# Patient Record
Sex: MALE | Race: WHITE | NOT HISPANIC OR LATINO | Employment: OTHER | URBAN - NONMETROPOLITAN AREA
[De-identification: names, ages, dates, MRNs, and addresses within clinical notes are randomized per-mention and may not be internally consistent; named-entity substitution may affect disease eponyms.]

---

## 2018-03-06 ENCOUNTER — OFFICE VISIT (OUTPATIENT)
Dept: UROLOGY | Facility: CLINIC | Age: 83
End: 2018-03-06

## 2018-03-06 DIAGNOSIS — N13.8 BPH WITH URINARY OBSTRUCTION: Primary | ICD-10-CM

## 2018-03-06 DIAGNOSIS — N18.30 CKD (CHRONIC KIDNEY DISEASE) STAGE 3, GFR 30-59 ML/MIN (HCC): ICD-10-CM

## 2018-03-06 DIAGNOSIS — N32.81 DETRUSOR INSTABILITY: ICD-10-CM

## 2018-03-06 DIAGNOSIS — N40.1 BPH WITH URINARY OBSTRUCTION: Primary | ICD-10-CM

## 2018-03-06 LAB
BILIRUB BLD-MCNC: NEGATIVE MG/DL
CLARITY, POC: CLEAR
COLOR UR: YELLOW
GLUCOSE UR STRIP-MCNC: NEGATIVE MG/DL
KETONES UR QL: NEGATIVE
LEUKOCYTE EST, POC: NEGATIVE
NITRITE UR-MCNC: NEGATIVE MG/ML
PH UR: 7 [PH] (ref 5–8)
PROT UR STRIP-MCNC: ABNORMAL MG/DL
RBC # UR STRIP: NEGATIVE /UL
SP GR UR: 1.02 (ref 1–1.03)
UROBILINOGEN UR QL: NORMAL

## 2018-03-06 PROCEDURE — 81003 URINALYSIS AUTO W/O SCOPE: CPT | Performed by: UROLOGY

## 2018-03-06 PROCEDURE — 51798 US URINE CAPACITY MEASURE: CPT | Performed by: UROLOGY

## 2018-03-06 PROCEDURE — 99204 OFFICE O/P NEW MOD 45 MIN: CPT | Performed by: UROLOGY

## 2018-03-06 RX ORDER — CETIRIZINE HYDROCHLORIDE 10 MG/1
TABLET, FILM COATED ORAL
Refills: 5 | COMMUNITY
Start: 2018-02-06 | End: 2022-01-02

## 2018-03-06 RX ORDER — SPIRONOLACTONE 25 MG/1
25 TABLET ORAL DAILY
Refills: 5 | COMMUNITY
Start: 2018-02-06 | End: 2022-01-08 | Stop reason: HOSPADM

## 2018-03-06 RX ORDER — LINAGLIPTIN 5 MG/1
TABLET, FILM COATED ORAL
Refills: 5 | COMMUNITY
Start: 2018-02-06 | End: 2022-01-02

## 2018-03-06 RX ORDER — LEVOTHYROXINE SODIUM 0.07 MG/1
75 TABLET ORAL DAILY
Refills: 5 | COMMUNITY
Start: 2018-02-06

## 2018-03-06 RX ORDER — BUMETANIDE 1 MG/1
1 TABLET ORAL
Refills: 2 | COMMUNITY
Start: 2018-02-06 | End: 2022-01-08 | Stop reason: HOSPADM

## 2018-03-06 RX ORDER — CARVEDILOL 6.25 MG/1
6.25 TABLET ORAL 2 TIMES DAILY WITH MEALS
Refills: 5 | COMMUNITY
Start: 2018-02-06

## 2018-03-06 RX ORDER — AMLODIPINE BESYLATE 10 MG/1
10 TABLET ORAL NIGHTLY
Refills: 5 | COMMUNITY
Start: 2018-02-06 | End: 2022-01-08 | Stop reason: HOSPADM

## 2018-03-06 RX ORDER — ASPIRIN 325 MG
325 TABLET, DELAYED RELEASE (ENTERIC COATED) ORAL DAILY
Refills: 5 | COMMUNITY
Start: 2018-02-06

## 2018-03-06 RX ORDER — HYDROCODONE BITARTRATE AND ACETAMINOPHEN 5; 325 MG/1; MG/1
1 TABLET ORAL 2 TIMES DAILY PRN
Refills: 0 | COMMUNITY
Start: 2018-02-06

## 2018-03-06 RX ORDER — ROSUVASTATIN CALCIUM 40 MG/1
40 TABLET, COATED ORAL DAILY
Refills: 5 | COMMUNITY
Start: 2018-02-06 | End: 2022-01-08 | Stop reason: HOSPADM

## 2018-03-06 RX ORDER — HYDRALAZINE HYDROCHLORIDE 100 MG/1
100 TABLET, FILM COATED ORAL 3 TIMES DAILY
Refills: 3 | COMMUNITY
Start: 2018-02-06 | End: 2022-01-08 | Stop reason: HOSPADM

## 2018-03-06 RX ORDER — FERROUS SULFATE 325(65) MG
325 TABLET ORAL 2 TIMES DAILY
Refills: 5 | COMMUNITY
Start: 2018-02-06

## 2018-03-06 RX ORDER — GABAPENTIN 600 MG/1
600 TABLET ORAL DAILY PRN
Refills: 2 | COMMUNITY
Start: 2018-02-06 | End: 2022-01-08 | Stop reason: HOSPADM

## 2018-03-06 RX ORDER — FINASTERIDE 5 MG/1
TABLET, FILM COATED ORAL
Refills: 3 | COMMUNITY
Start: 2018-02-08 | End: 2022-01-02

## 2018-03-06 RX ORDER — LOSARTAN POTASSIUM 100 MG/1
TABLET ORAL
Refills: 5 | COMMUNITY
Start: 2018-02-06 | End: 2022-01-02

## 2018-03-06 RX ORDER — TERAZOSIN 5 MG/1
5 CAPSULE ORAL NIGHTLY
Refills: 1 | COMMUNITY
Start: 2018-02-06 | End: 2022-01-08 | Stop reason: HOSPADM

## 2018-03-06 RX ORDER — POLYETHYLENE GLYCOL 3350 17 G/17G
17 POWDER, FOR SOLUTION ORAL DAILY PRN
Refills: 11 | COMMUNITY
Start: 2018-02-06

## 2018-03-06 NOTE — PROGRESS NOTES
Chief Complaint:          Chief Complaint   Patient presents with   • Urinary Frequency       HPI:   82 y.o. male.  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  Frequency urgency and 3 episodes of urge incontinence daily.  He's not had a stroke.  He has significant stage III chronic kidney disease with a creatinine of 2.22 on significant polypharmacy.  He was just started on finasteride.  He has no burning, blood, dribbling or hesitancy.  I explained to his daughter at length he has multiple indications for frequency including the significant polypharmacy, the significant chronic kidney disease.  His diabetes is extremely well controlled.    Past Medical History:        Past Medical History:   Diagnosis Date   • BPH with urinary obstruction    • CHF (congestive heart failure)    • Chronic kidney disease    • Diabetes mellitus    • Hypertension          Current Meds:     Current Outpatient Prescriptions   Medication Sig Dispense Refill   • ALL DAY ALLERGY 10 MG tablet   5   • amLODIPine (NORVASC) 10 MG tablet   5   • aspirin  MG tablet   5   • bumetanide (BUMEX) 1 MG tablet   2   • carvedilol (COREG) 6.25 MG tablet   5   • ferrous sulfate 325 (65 FE) MG tablet   5   • finasteride (PROSCAR) 5 MG tablet   3   • gabapentin (NEURONTIN) 600 MG tablet   2   • hydrALAZINE (APRESOLINE) 100 MG tablet   3   • HYDROcodone-acetaminophen (NORCO) 5-325 MG per tablet   0   • Insulin Glargine (Insulin Glargine) 100 UNIT/ML injection pen   1   • levothyroxine (SYNTHROID, LEVOTHROID) 75 MCG tablet   5   • losartan (COZAAR) 100 MG tablet   5   • polyethylene glycol (MIRALAX) powder   11   • rosuvastatin (CRESTOR) 40 MG tablet   5   • spironolactone (ALDACTONE) 25 MG tablet   5   • terazosin (HYTRIN) 5 MG capsule   1   • TRADJENTA 5 MG tablet tablet   5   • VOLTAREN 1 % gel gel   11     No  current facility-administered medications for this visit.         Allergies:      Allergies   Allergen Reactions   • Keflex [Cephalexin] Hives   • Sulfa Antibiotics Hives        Past Surgical History:     Past Surgical History:   Procedure Laterality Date   • CARDIAC SURGERY     • CARDIAC VALVE REPLACEMENT           Social History:     Social History     Social History   • Marital status:      Spouse name: N/A   • Number of children: N/A   • Years of education: N/A     Occupational History   • Not on file.     Social History Main Topics   • Smoking status: Never Smoker   • Smokeless tobacco: Not on file   • Alcohol use No   • Drug use: No   • Sexual activity: Not on file     Other Topics Concern   • Not on file     Social History Narrative   • No narrative on file       Family History:     Family History   Problem Relation Age of Onset   • No Known Problems Father    • No Known Problems Mother        Review of Systems:     Review of Systems   Constitutional: Negative.    HENT: Positive for hearing loss.    Eyes: Negative.    Respiratory: Negative.    Cardiovascular: Negative.    Gastrointestinal: Negative.    Endocrine: Negative.    Genitourinary: Positive for frequency and urgency.   Musculoskeletal: Negative.    Allergic/Immunologic: Negative.    Neurological: Negative.    Hematological: Negative.    Psychiatric/Behavioral: Negative.        Physical Exam:     Physical Exam   Constitutional: He is oriented to person, place, and time. He appears well-developed and well-nourished.   HENT:   Head: Normocephalic and atraumatic.   Eyes: Conjunctivae and EOM are normal. Pupils are equal, round, and reactive to light.   Neck: Normal range of motion.   Cardiovascular: Normal rate, regular rhythm, normal heart sounds and intact distal pulses.    Pulmonary/Chest: Effort normal and breath sounds normal.   Abdominal: Soft. Bowel sounds are normal.   Genitourinary: Rectum normal, prostate normal and penis normal.    Genitourinary Comments: Hard of hearing, Soft nontender abdomen with no organomegaly, rigidity, or tenderness.  He has normal external genitalia and uncircumcised phallus with a freely movable foreskin bilaterally descended testes without masses there is no inguinal hernias adenopathy or abnormalities he had good rectal tone and a small smooth firm prostate.  There is no nodularity or any suspicious rectal abnormalities     Musculoskeletal: Normal range of motion.   Neurological: He is alert and oriented to person, place, and time. He has normal reflexes.   Skin: Skin is warm and dry.   Psychiatric: He has a normal mood and affect. His behavior is normal. Judgment and thought content normal.   Nursing note and vitals reviewed.      I have reviewed the following portions of the patient's history: allergies, current medications, past family history, past medical history, past social history, past surgical history, problem list and ROS and confirm it's accurate.      Procedure:       Assessment/Plan:   Detrusor instability-very symptomatic with a marked- 3 episodes of urge incontinence daily.  I'm going to start him on anticholinergic and gave him samples of Myrbetriq.  BPH: Discussed the pathophysiology of BPH and obstruction.  We discussed the static and dynamic effect effects of BPH as well as using 5 alpha reductase inhibitors versus alpha blockade.  We discussed the indications for transurethral surgery as well.  And/ or other therapeutic options available including all of the newer techniques.  The very small prostate and therefore I recommended he stop the finasteride.  Chronic kidney disease-III-with a creatinine of 2.22 explained to his daughter of the difficulty in urgency given the fact he has to urinate a chronically diluted urine           This document has been electronically signed by MARY MOON MD March 6, 2018 8:30 AM

## 2018-03-08 PROBLEM — N18.30 CKD (CHRONIC KIDNEY DISEASE) STAGE 3, GFR 30-59 ML/MIN (HCC): Status: ACTIVE | Noted: 2018-03-08

## 2018-03-08 PROBLEM — N32.81 DETRUSOR INSTABILITY: Status: ACTIVE | Noted: 2018-03-08

## 2020-06-25 ENCOUNTER — TRANSCRIBE ORDERS (OUTPATIENT)
Dept: ADMINISTRATIVE | Facility: HOSPITAL | Age: 85
End: 2020-06-25

## 2020-06-25 ENCOUNTER — LAB (OUTPATIENT)
Dept: LAB | Facility: HOSPITAL | Age: 85
End: 2020-06-25

## 2020-06-25 DIAGNOSIS — N18.9 ANEMIA IN CHRONIC KIDNEY DISEASE, UNSPECIFIED CKD STAGE: ICD-10-CM

## 2020-06-25 DIAGNOSIS — N18.30 CHRONIC KIDNEY DISEASE, STAGE III (MODERATE) (HCC): ICD-10-CM

## 2020-06-25 DIAGNOSIS — D63.1 ANEMIA IN CHRONIC KIDNEY DISEASE, UNSPECIFIED CKD STAGE: ICD-10-CM

## 2020-06-25 DIAGNOSIS — E11.21 TYPE 2 DIABETES MELLITUS WITH DIABETIC NEPHROPATHY, UNSPECIFIED WHETHER LONG TERM INSULIN USE (HCC): ICD-10-CM

## 2020-06-25 DIAGNOSIS — E03.9 HYPOTHYROIDISM, UNSPECIFIED TYPE: ICD-10-CM

## 2020-06-25 DIAGNOSIS — N25.81 SECONDARY HYPERPARATHYROIDISM OF RENAL ORIGIN (HCC): ICD-10-CM

## 2020-06-25 DIAGNOSIS — N18.9 CHRONIC KIDNEY DISEASE, UNSPECIFIED CKD STAGE: Primary | ICD-10-CM

## 2020-06-25 DIAGNOSIS — N18.9 CHRONIC KIDNEY DISEASE, UNSPECIFIED CKD STAGE: ICD-10-CM

## 2020-06-25 DIAGNOSIS — E87.1 HYPONATREMIA: ICD-10-CM

## 2020-06-25 LAB
ALBUMIN SERPL-MCNC: 4.5 G/DL (ref 3.5–5.2)
ALBUMIN/GLOB SERPL: 2 G/DL
ALP SERPL-CCNC: 76 U/L (ref 39–117)
ALT SERPL W P-5'-P-CCNC: 21 U/L (ref 1–41)
ANION GAP SERPL CALCULATED.3IONS-SCNC: 13 MMOL/L (ref 5–15)
AST SERPL-CCNC: 26 U/L (ref 1–40)
BASOPHILS # BLD AUTO: 0.05 10*3/MM3 (ref 0–0.2)
BASOPHILS NFR BLD AUTO: 0.8 % (ref 0–1.5)
BILIRUB SERPL-MCNC: 0.3 MG/DL (ref 0.2–1.2)
BUN BLD-MCNC: 32 MG/DL (ref 8–23)
BUN/CREAT SERPL: 16.4 (ref 7–25)
CALCIUM SPEC-SCNC: 10.2 MG/DL (ref 8.6–10.5)
CHLORIDE SERPL-SCNC: 100 MMOL/L (ref 98–107)
CO2 SERPL-SCNC: 23 MMOL/L (ref 22–29)
CREAT BLD-MCNC: 1.95 MG/DL (ref 0.76–1.27)
CREAT UR-MCNC: 56.2 MG/DL
DEPRECATED RDW RBC AUTO: 44.2 FL (ref 37–54)
EOSINOPHIL # BLD AUTO: 0.39 10*3/MM3 (ref 0–0.4)
EOSINOPHIL NFR BLD AUTO: 6.1 % (ref 0.3–6.2)
ERYTHROCYTE [DISTWIDTH] IN BLOOD BY AUTOMATED COUNT: 12.4 % (ref 12.3–15.4)
FERRITIN SERPL-MCNC: 452 NG/ML (ref 30–400)
GFR SERPL CREATININE-BSD FRML MDRD: 33 ML/MIN/1.73
GLOBULIN UR ELPH-MCNC: 2.3 GM/DL
GLUCOSE BLD-MCNC: 92 MG/DL (ref 65–99)
HCT VFR BLD AUTO: 34.6 % (ref 37.5–51)
HGB BLD-MCNC: 11.4 G/DL (ref 13–17.7)
IMM GRANULOCYTES # BLD AUTO: 0.02 10*3/MM3 (ref 0–0.05)
IMM GRANULOCYTES NFR BLD AUTO: 0.3 % (ref 0–0.5)
IRON 24H UR-MRATE: 72 MCG/DL (ref 59–158)
IRON SATN MFR SERPL: 23 % (ref 20–50)
LYMPHOCYTES # BLD AUTO: 1.54 10*3/MM3 (ref 0.7–3.1)
LYMPHOCYTES NFR BLD AUTO: 24 % (ref 19.6–45.3)
MCH RBC QN AUTO: 31.6 PG (ref 26.6–33)
MCHC RBC AUTO-ENTMCNC: 32.9 G/DL (ref 31.5–35.7)
MCV RBC AUTO: 95.8 FL (ref 79–97)
MONOCYTES # BLD AUTO: 0.71 10*3/MM3 (ref 0.1–0.9)
MONOCYTES NFR BLD AUTO: 11 % (ref 5–12)
NEUTROPHILS # BLD AUTO: 3.72 10*3/MM3 (ref 1.7–7)
NEUTROPHILS NFR BLD AUTO: 57.8 % (ref 42.7–76)
NRBC BLD AUTO-RTO: 0 /100 WBC (ref 0–0.2)
PHOSPHATE SERPL-MCNC: 3.8 MG/DL (ref 2.5–4.5)
PLATELET # BLD AUTO: 204 10*3/MM3 (ref 140–450)
PMV BLD AUTO: 10.3 FL (ref 6–12)
POTASSIUM BLD-SCNC: 4.2 MMOL/L (ref 3.5–5.2)
PROT SERPL-MCNC: 6.8 G/DL (ref 6–8.5)
PROT UR-MCNC: 51 MG/DL
PROT/CREAT UR: 907.5 MG/G CREA (ref 0–200)
PTH-INTACT SERPL-MCNC: 42.7 PG/ML (ref 15–65)
RBC # BLD AUTO: 3.61 10*6/MM3 (ref 4.14–5.8)
SODIUM BLD-SCNC: 136 MMOL/L (ref 136–145)
TIBC SERPL-MCNC: 319 MCG/DL (ref 298–536)
TRANSFERRIN SERPL-MCNC: 214 MG/DL (ref 200–360)
WBC NRBC COR # BLD: 6.43 10*3/MM3 (ref 3.4–10.8)

## 2020-06-25 PROCEDURE — 84100 ASSAY OF PHOSPHORUS: CPT

## 2020-06-25 PROCEDURE — 84156 ASSAY OF PROTEIN URINE: CPT

## 2020-06-25 PROCEDURE — 82728 ASSAY OF FERRITIN: CPT

## 2020-06-25 PROCEDURE — 80053 COMPREHEN METABOLIC PANEL: CPT

## 2020-06-25 PROCEDURE — 85025 COMPLETE CBC W/AUTO DIFF WBC: CPT

## 2020-06-25 PROCEDURE — 36415 COLL VENOUS BLD VENIPUNCTURE: CPT

## 2020-06-25 PROCEDURE — 83970 ASSAY OF PARATHORMONE: CPT

## 2020-06-25 PROCEDURE — 83540 ASSAY OF IRON: CPT

## 2020-06-25 PROCEDURE — 82570 ASSAY OF URINE CREATININE: CPT

## 2020-06-25 PROCEDURE — 84466 ASSAY OF TRANSFERRIN: CPT

## 2022-01-01 ENCOUNTER — APPOINTMENT (OUTPATIENT)
Dept: CT IMAGING | Facility: HOSPITAL | Age: 87
End: 2022-01-01

## 2022-01-01 ENCOUNTER — APPOINTMENT (OUTPATIENT)
Dept: MRI IMAGING | Facility: HOSPITAL | Age: 87
End: 2022-01-01

## 2022-01-01 ENCOUNTER — HOSPITAL ENCOUNTER (INPATIENT)
Facility: HOSPITAL | Age: 87
LOS: 6 days | Discharge: HOME | End: 2022-01-08
Attending: STUDENT IN AN ORGANIZED HEALTH CARE EDUCATION/TRAINING PROGRAM | Admitting: INTERNAL MEDICINE

## 2022-01-01 DIAGNOSIS — E87.1 HYPONATREMIA: Primary | ICD-10-CM

## 2022-01-01 LAB
A-A DO2: 36.8 MMHG (ref 0–300)
ALBUMIN SERPL-MCNC: 4.26 G/DL (ref 3.5–5.2)
ALBUMIN/GLOB SERPL: 1.7 G/DL
ALP SERPL-CCNC: 89 U/L (ref 39–117)
ALT SERPL W P-5'-P-CCNC: 22 U/L (ref 1–41)
AMMONIA BLD-SCNC: 17 UMOL/L (ref 16–60)
ANION GAP SERPL CALCULATED.3IONS-SCNC: 13.5 MMOL/L (ref 5–15)
APTT PPP: 52.4 SECONDS (ref 25.5–35.4)
ARTERIAL PATENCY WRIST A: ABNORMAL
AST SERPL-CCNC: 37 U/L (ref 1–40)
ATMOSPHERIC PRESS: 719 MMHG
BASE EXCESS BLDA CALC-SCNC: -3.8 MMOL/L (ref 0–2)
BASOPHILS # BLD AUTO: 0.04 10*3/MM3 (ref 0–0.2)
BASOPHILS NFR BLD AUTO: 0.3 % (ref 0–1.5)
BDY SITE: ABNORMAL
BILIRUB SERPL-MCNC: 0.5 MG/DL (ref 0–1.2)
BODY TEMPERATURE: 0 C
BUN SERPL-MCNC: 24 MG/DL (ref 8–23)
BUN/CREAT SERPL: 15.1 (ref 7–25)
CALCIUM SPEC-SCNC: 9.7 MG/DL (ref 8.6–10.5)
CHLORIDE SERPL-SCNC: 82 MMOL/L (ref 98–107)
CK SERPL-CCNC: 345 U/L (ref 20–200)
CO2 BLDA-SCNC: 21 MMOL/L (ref 22–33)
CO2 SERPL-SCNC: 18.5 MMOL/L (ref 22–29)
COHGB MFR BLD: 0.9 % (ref 0–5)
CREAT SERPL-MCNC: 1.59 MG/DL (ref 0.76–1.27)
CRP SERPL-MCNC: <0.3 MG/DL (ref 0–0.5)
D-LACTATE SERPL-SCNC: 1.5 MMOL/L (ref 0.5–2)
DEPRECATED RDW RBC AUTO: 45.1 FL (ref 37–54)
EOSINOPHIL # BLD AUTO: 0.26 10*3/MM3 (ref 0–0.4)
EOSINOPHIL NFR BLD AUTO: 2 % (ref 0.3–6.2)
ERYTHROCYTE [DISTWIDTH] IN BLOOD BY AUTOMATED COUNT: 13.2 % (ref 12.3–15.4)
ERYTHROCYTE [SEDIMENTATION RATE] IN BLOOD: 26 MM/HR (ref 0–20)
FLUAV RNA RESP QL NAA+PROBE: NOT DETECTED
FLUBV RNA RESP QL NAA+PROBE: NOT DETECTED
GFR SERPL CREATININE-BSD FRML MDRD: 41 ML/MIN/1.73
GLOBULIN UR ELPH-MCNC: 2.4 GM/DL
GLUCOSE SERPL-MCNC: 164 MG/DL (ref 65–99)
HCO3 BLDA-SCNC: 20 MMOL/L (ref 20–26)
HCT VFR BLD AUTO: 32.3 % (ref 37.5–51)
HCT VFR BLD CALC: 34.7 % (ref 38–51)
HGB BLD-MCNC: 10.8 G/DL (ref 13–17.7)
HGB BLDA-MCNC: 11.3 G/DL (ref 14–18)
HOLD SPECIMEN: NORMAL
HOLD SPECIMEN: NORMAL
IMM GRANULOCYTES # BLD AUTO: 0.06 10*3/MM3 (ref 0–0.05)
IMM GRANULOCYTES NFR BLD AUTO: 0.5 % (ref 0–0.5)
INHALED O2 CONCENTRATION: 21 %
INR PPP: 0.92 (ref 0.9–1.1)
LYMPHOCYTES # BLD AUTO: 1.34 10*3/MM3 (ref 0.7–3.1)
LYMPHOCYTES NFR BLD AUTO: 10.3 % (ref 19.6–45.3)
Lab: ABNORMAL
MAGNESIUM SERPL-MCNC: 1.8 MG/DL (ref 1.6–2.4)
MCH RBC QN AUTO: 31.3 PG (ref 26.6–33)
MCHC RBC AUTO-ENTMCNC: 33.4 G/DL (ref 31.5–35.7)
MCV RBC AUTO: 93.6 FL (ref 79–97)
METHGB BLD QL: 0.3 % (ref 0–3)
MODALITY: ABNORMAL
MONOCYTES # BLD AUTO: 1.1 10*3/MM3 (ref 0.1–0.9)
MONOCYTES NFR BLD AUTO: 8.4 % (ref 5–12)
NEUTROPHILS NFR BLD AUTO: 10.26 10*3/MM3 (ref 1.7–7)
NEUTROPHILS NFR BLD AUTO: 78.5 % (ref 42.7–76)
NOTE: ABNORMAL
NRBC BLD AUTO-RTO: 0 /100 WBC (ref 0–0.2)
OXYHGB MFR BLDV: 92.9 % (ref 94–99)
PCO2 BLDA: 31.8 MM HG (ref 35–45)
PCO2 TEMP ADJ BLD: ABNORMAL MM[HG]
PH BLDA: 7.41 PH UNITS (ref 7.35–7.45)
PH, TEMP CORRECTED: ABNORMAL
PLATELET # BLD AUTO: 175 10*3/MM3 (ref 140–450)
PMV BLD AUTO: 10 FL (ref 6–12)
PO2 BLDA: 68.6 MM HG (ref 83–108)
PO2 TEMP ADJ BLD: ABNORMAL MM[HG]
POTASSIUM SERPL-SCNC: 3.8 MMOL/L (ref 3.5–5.2)
PROT SERPL-MCNC: 6.7 G/DL (ref 6–8.5)
PROTHROMBIN TIME: 12.8 SECONDS (ref 12.8–14.5)
RBC # BLD AUTO: 3.45 10*6/MM3 (ref 4.14–5.8)
SAO2 % BLDCOA: 94 % (ref 94–99)
SARS-COV-2 RNA RESP QL NAA+PROBE: NOT DETECTED
SODIUM SERPL-SCNC: 114 MMOL/L (ref 136–145)
TROPONIN T SERPL-MCNC: 0.01 NG/ML (ref 0–0.03)
VENTILATOR MODE: ABNORMAL
WBC NRBC COR # BLD: 13.06 10*3/MM3 (ref 3.4–10.8)
WHOLE BLOOD HOLD SPECIMEN: NORMAL
WHOLE BLOOD HOLD SPECIMEN: NORMAL

## 2022-01-01 PROCEDURE — 80053 COMPREHEN METABOLIC PANEL: CPT | Performed by: STUDENT IN AN ORGANIZED HEALTH CARE EDUCATION/TRAINING PROGRAM

## 2022-01-01 PROCEDURE — 71250 CT THORAX DX C-: CPT

## 2022-01-01 PROCEDURE — 85025 COMPLETE CBC W/AUTO DIFF WBC: CPT | Performed by: STUDENT IN AN ORGANIZED HEALTH CARE EDUCATION/TRAINING PROGRAM

## 2022-01-01 PROCEDURE — 25010000002 ONDANSETRON PER 1 MG: Performed by: STUDENT IN AN ORGANIZED HEALTH CARE EDUCATION/TRAINING PROGRAM

## 2022-01-01 PROCEDURE — 82805 BLOOD GASES W/O2 SATURATION: CPT

## 2022-01-01 PROCEDURE — 83735 ASSAY OF MAGNESIUM: CPT | Performed by: STUDENT IN AN ORGANIZED HEALTH CARE EDUCATION/TRAINING PROGRAM

## 2022-01-01 PROCEDURE — 36415 COLL VENOUS BLD VENIPUNCTURE: CPT

## 2022-01-01 PROCEDURE — 99285 EMERGENCY DEPT VISIT HI MDM: CPT

## 2022-01-01 PROCEDURE — 74176 CT ABD & PELVIS W/O CONTRAST: CPT

## 2022-01-01 PROCEDURE — 93005 ELECTROCARDIOGRAM TRACING: CPT | Performed by: STUDENT IN AN ORGANIZED HEALTH CARE EDUCATION/TRAINING PROGRAM

## 2022-01-01 PROCEDURE — 82140 ASSAY OF AMMONIA: CPT | Performed by: STUDENT IN AN ORGANIZED HEALTH CARE EDUCATION/TRAINING PROGRAM

## 2022-01-01 PROCEDURE — 85610 PROTHROMBIN TIME: CPT | Performed by: STUDENT IN AN ORGANIZED HEALTH CARE EDUCATION/TRAINING PROGRAM

## 2022-01-01 PROCEDURE — P9612 CATHETERIZE FOR URINE SPEC: HCPCS

## 2022-01-01 PROCEDURE — 82550 ASSAY OF CK (CPK): CPT | Performed by: STUDENT IN AN ORGANIZED HEALTH CARE EDUCATION/TRAINING PROGRAM

## 2022-01-01 PROCEDURE — 83605 ASSAY OF LACTIC ACID: CPT | Performed by: STUDENT IN AN ORGANIZED HEALTH CARE EDUCATION/TRAINING PROGRAM

## 2022-01-01 PROCEDURE — 93010 ELECTROCARDIOGRAM REPORT: CPT | Performed by: INTERNAL MEDICINE

## 2022-01-01 PROCEDURE — 82375 ASSAY CARBOXYHB QUANT: CPT

## 2022-01-01 PROCEDURE — 83050 HGB METHEMOGLOBIN QUAN: CPT

## 2022-01-01 PROCEDURE — 84484 ASSAY OF TROPONIN QUANT: CPT | Performed by: STUDENT IN AN ORGANIZED HEALTH CARE EDUCATION/TRAINING PROGRAM

## 2022-01-01 PROCEDURE — 36600 WITHDRAWAL OF ARTERIAL BLOOD: CPT

## 2022-01-01 PROCEDURE — 87636 SARSCOV2 & INF A&B AMP PRB: CPT | Performed by: STUDENT IN AN ORGANIZED HEALTH CARE EDUCATION/TRAINING PROGRAM

## 2022-01-01 PROCEDURE — 70450 CT HEAD/BRAIN W/O DYE: CPT

## 2022-01-01 PROCEDURE — 85730 THROMBOPLASTIN TIME PARTIAL: CPT | Performed by: STUDENT IN AN ORGANIZED HEALTH CARE EDUCATION/TRAINING PROGRAM

## 2022-01-01 PROCEDURE — 85652 RBC SED RATE AUTOMATED: CPT | Performed by: STUDENT IN AN ORGANIZED HEALTH CARE EDUCATION/TRAINING PROGRAM

## 2022-01-01 PROCEDURE — 87040 BLOOD CULTURE FOR BACTERIA: CPT | Performed by: STUDENT IN AN ORGANIZED HEALTH CARE EDUCATION/TRAINING PROGRAM

## 2022-01-01 PROCEDURE — 86140 C-REACTIVE PROTEIN: CPT | Performed by: STUDENT IN AN ORGANIZED HEALTH CARE EDUCATION/TRAINING PROGRAM

## 2022-01-01 RX ORDER — SODIUM CHLORIDE 0.9 % (FLUSH) 0.9 %
10 SYRINGE (ML) INJECTION AS NEEDED
Status: DISCONTINUED | OUTPATIENT
Start: 2022-01-01 | End: 2022-01-08 | Stop reason: HOSPADM

## 2022-01-01 RX ORDER — 3% SODIUM CHLORIDE 3 G/100ML
100 INJECTION, SOLUTION INTRAVENOUS ONCE
Status: COMPLETED | OUTPATIENT
Start: 2022-01-01 | End: 2022-01-02

## 2022-01-01 RX ORDER — ONDANSETRON 2 MG/ML
4 INJECTION INTRAMUSCULAR; INTRAVENOUS ONCE
Status: COMPLETED | OUTPATIENT
Start: 2022-01-01 | End: 2022-01-01

## 2022-01-01 RX ADMIN — SODIUM CHLORIDE 500 ML: 9 INJECTION, SOLUTION INTRAVENOUS at 23:38

## 2022-01-01 RX ADMIN — ONDANSETRON 4 MG: 2 INJECTION INTRAMUSCULAR; INTRAVENOUS at 23:39

## 2022-01-02 PROBLEM — E87.1 HYPONATREMIA: Status: ACTIVE | Noted: 2022-01-02

## 2022-01-02 LAB
AMPHET+METHAMPHET UR QL: NEGATIVE
AMPHETAMINES UR QL: NEGATIVE
ANION GAP SERPL CALCULATED.3IONS-SCNC: 12 MMOL/L (ref 5–15)
ANION GAP SERPL CALCULATED.3IONS-SCNC: 12.6 MMOL/L (ref 5–15)
ANION GAP SERPL CALCULATED.3IONS-SCNC: 13.3 MMOL/L (ref 5–15)
ANION GAP SERPL CALCULATED.3IONS-SCNC: 14.6 MMOL/L (ref 5–15)
ANION GAP SERPL CALCULATED.3IONS-SCNC: 9.3 MMOL/L (ref 5–15)
APAP SERPL-MCNC: <5 MCG/ML (ref 0–30)
BACTERIA UR QL AUTO: NORMAL /HPF
BARBITURATES UR QL SCN: NEGATIVE
BASOPHILS # BLD AUTO: 0.04 10*3/MM3 (ref 0–0.2)
BASOPHILS NFR BLD AUTO: 0.3 % (ref 0–1.5)
BENZODIAZ UR QL SCN: NEGATIVE
BILIRUB UR QL STRIP: NEGATIVE
BUN SERPL-MCNC: 18 MG/DL (ref 8–23)
BUN SERPL-MCNC: 20 MG/DL (ref 8–23)
BUN SERPL-MCNC: 21 MG/DL (ref 8–23)
BUN SERPL-MCNC: 22 MG/DL (ref 8–23)
BUN SERPL-MCNC: 22 MG/DL (ref 8–23)
BUN/CREAT SERPL: 12.7 (ref 7–25)
BUN/CREAT SERPL: 13.3 (ref 7–25)
BUN/CREAT SERPL: 14.4 (ref 7–25)
BUN/CREAT SERPL: 14.7 (ref 7–25)
BUN/CREAT SERPL: 14.9 (ref 7–25)
BUPRENORPHINE SERPL-MCNC: NEGATIVE NG/ML
CALCIUM SPEC-SCNC: 9.2 MG/DL (ref 8.6–10.5)
CALCIUM SPEC-SCNC: 9.3 MG/DL (ref 8.6–10.5)
CALCIUM SPEC-SCNC: 9.4 MG/DL (ref 8.6–10.5)
CALCIUM SPEC-SCNC: 9.5 MG/DL (ref 8.6–10.5)
CALCIUM SPEC-SCNC: 9.6 MG/DL (ref 8.6–10.5)
CANNABINOIDS SERPL QL: NEGATIVE
CHLORIDE SERPL-SCNC: 85 MMOL/L (ref 98–107)
CHLORIDE SERPL-SCNC: 86 MMOL/L (ref 98–107)
CHLORIDE SERPL-SCNC: 87 MMOL/L (ref 98–107)
CHLORIDE SERPL-SCNC: 89 MMOL/L (ref 98–107)
CHLORIDE SERPL-SCNC: 90 MMOL/L (ref 98–107)
CHLORIDE UR-SCNC: 57 MMOL/L
CLARITY UR: CLEAR
CO2 SERPL-SCNC: 18.4 MMOL/L (ref 22–29)
CO2 SERPL-SCNC: 19.7 MMOL/L (ref 22–29)
CO2 SERPL-SCNC: 21 MMOL/L (ref 22–29)
CO2 SERPL-SCNC: 21.4 MMOL/L (ref 22–29)
CO2 SERPL-SCNC: 22.7 MMOL/L (ref 22–29)
COCAINE UR QL: NEGATIVE
COLOR UR: YELLOW
CREAT SERPL-MCNC: 1.42 MG/DL (ref 0.76–1.27)
CREAT SERPL-MCNC: 1.46 MG/DL (ref 0.76–1.27)
CREAT SERPL-MCNC: 1.48 MG/DL (ref 0.76–1.27)
CREAT SERPL-MCNC: 1.5 MG/DL (ref 0.76–1.27)
CREAT SERPL-MCNC: 1.5 MG/DL (ref 0.76–1.27)
CRP SERPL-MCNC: 0.66 MG/DL (ref 0–0.5)
DEPRECATED RDW RBC AUTO: 45.1 FL (ref 37–54)
EOSINOPHIL # BLD AUTO: 0.08 10*3/MM3 (ref 0–0.4)
EOSINOPHIL NFR BLD AUTO: 0.6 % (ref 0.3–6.2)
ERYTHROCYTE [DISTWIDTH] IN BLOOD BY AUTOMATED COUNT: 13.2 % (ref 12.3–15.4)
ERYTHROCYTE [SEDIMENTATION RATE] IN BLOOD: 11 MM/HR (ref 0–20)
ETHANOL BLD-MCNC: <10 MG/DL (ref 0–10)
ETHANOL UR QL: <0.01 %
GFR SERPL CREATININE-BSD FRML MDRD: 44 ML/MIN/1.73
GFR SERPL CREATININE-BSD FRML MDRD: 44 ML/MIN/1.73
GFR SERPL CREATININE-BSD FRML MDRD: 45 ML/MIN/1.73
GFR SERPL CREATININE-BSD FRML MDRD: 46 ML/MIN/1.73
GFR SERPL CREATININE-BSD FRML MDRD: 47 ML/MIN/1.73
GLUCOSE BLDC GLUCOMTR-MCNC: 126 MG/DL (ref 70–130)
GLUCOSE BLDC GLUCOMTR-MCNC: 130 MG/DL (ref 70–130)
GLUCOSE BLDC GLUCOMTR-MCNC: 140 MG/DL (ref 70–130)
GLUCOSE SERPL-MCNC: 120 MG/DL (ref 65–99)
GLUCOSE SERPL-MCNC: 136 MG/DL (ref 65–99)
GLUCOSE SERPL-MCNC: 146 MG/DL (ref 65–99)
GLUCOSE SERPL-MCNC: 157 MG/DL (ref 65–99)
GLUCOSE SERPL-MCNC: 176 MG/DL (ref 65–99)
GLUCOSE UR STRIP-MCNC: NEGATIVE MG/DL
HCT VFR BLD AUTO: 31 % (ref 37.5–51)
HGB BLD-MCNC: 10.3 G/DL (ref 13–17.7)
HGB UR QL STRIP.AUTO: NEGATIVE
HYALINE CASTS UR QL AUTO: NORMAL /LPF
IMM GRANULOCYTES # BLD AUTO: 0.06 10*3/MM3 (ref 0–0.05)
IMM GRANULOCYTES NFR BLD AUTO: 0.5 % (ref 0–0.5)
KETONES UR QL STRIP: ABNORMAL
LEUKOCYTE ESTERASE UR QL STRIP.AUTO: NEGATIVE
LYMPHOCYTES # BLD AUTO: 1 10*3/MM3 (ref 0.7–3.1)
LYMPHOCYTES NFR BLD AUTO: 7.6 % (ref 19.6–45.3)
MCH RBC QN AUTO: 31.2 PG (ref 26.6–33)
MCHC RBC AUTO-ENTMCNC: 33.2 G/DL (ref 31.5–35.7)
MCV RBC AUTO: 93.9 FL (ref 79–97)
METHADONE UR QL SCN: NEGATIVE
MONOCYTES # BLD AUTO: 1.39 10*3/MM3 (ref 0.1–0.9)
MONOCYTES NFR BLD AUTO: 10.5 % (ref 5–12)
NEUTROPHILS NFR BLD AUTO: 10.65 10*3/MM3 (ref 1.7–7)
NEUTROPHILS NFR BLD AUTO: 80.5 % (ref 42.7–76)
NITRITE UR QL STRIP: NEGATIVE
NRBC BLD AUTO-RTO: 0 /100 WBC (ref 0–0.2)
NT-PROBNP SERPL-MCNC: 413 PG/ML (ref 0–1800)
OPIATES UR QL: NEGATIVE
OXYCODONE UR QL SCN: NEGATIVE
PCP UR QL SCN: NEGATIVE
PH UR STRIP.AUTO: 5.5 [PH] (ref 5–8)
PLATELET # BLD AUTO: 148 10*3/MM3 (ref 140–450)
PMV BLD AUTO: 9.6 FL (ref 6–12)
POTASSIUM SERPL-SCNC: 4.1 MMOL/L (ref 3.5–5.2)
POTASSIUM SERPL-SCNC: 4.2 MMOL/L (ref 3.5–5.2)
POTASSIUM SERPL-SCNC: 4.4 MMOL/L (ref 3.5–5.2)
POTASSIUM UR-SCNC: 20.5 MMOL/L
PROPOXYPH UR QL: NEGATIVE
PROT UR QL STRIP: ABNORMAL
RBC # BLD AUTO: 3.3 10*6/MM3 (ref 4.14–5.8)
RBC # UR STRIP: NORMAL /HPF
REF LAB TEST METHOD: NORMAL
SALICYLATES SERPL-MCNC: <0.3 MG/DL
SODIUM SERPL-SCNC: 118 MMOL/L (ref 136–145)
SODIUM SERPL-SCNC: 119 MMOL/L (ref 136–145)
SODIUM SERPL-SCNC: 121 MMOL/L (ref 136–145)
SODIUM SERPL-SCNC: 122 MMOL/L (ref 136–145)
SODIUM SERPL-SCNC: 122 MMOL/L (ref 136–145)
SODIUM UR-SCNC: 64 MMOL/L
SP GR UR STRIP: 1.01 (ref 1–1.03)
SQUAMOUS #/AREA URNS HPF: NORMAL /HPF
TRICYCLICS UR QL SCN: NEGATIVE
URATE SERPL-MCNC: 5.1 MG/DL (ref 3.4–7)
UROBILINOGEN UR QL STRIP: ABNORMAL
WBC # UR STRIP: NORMAL /HPF
WBC NRBC COR # BLD: 13.22 10*3/MM3 (ref 3.4–10.8)

## 2022-01-02 PROCEDURE — 80143 DRUG ASSAY ACETAMINOPHEN: CPT | Performed by: EMERGENCY MEDICINE

## 2022-01-02 PROCEDURE — 25010000002 ONDANSETRON PER 1 MG: Performed by: STUDENT IN AN ORGANIZED HEALTH CARE EDUCATION/TRAINING PROGRAM

## 2022-01-02 PROCEDURE — 82077 ASSAY SPEC XCP UR&BREATH IA: CPT | Performed by: EMERGENCY MEDICINE

## 2022-01-02 PROCEDURE — 80048 BASIC METABOLIC PNL TOTAL CA: CPT | Performed by: STUDENT IN AN ORGANIZED HEALTH CARE EDUCATION/TRAINING PROGRAM

## 2022-01-02 PROCEDURE — 80306 DRUG TEST PRSMV INSTRMNT: CPT | Performed by: EMERGENCY MEDICINE

## 2022-01-02 PROCEDURE — 84133 ASSAY OF URINE POTASSIUM: CPT | Performed by: STUDENT IN AN ORGANIZED HEALTH CARE EDUCATION/TRAINING PROGRAM

## 2022-01-02 PROCEDURE — 25010000002 VANCOMYCIN 5 G RECONSTITUTED SOLUTION: Performed by: EMERGENCY MEDICINE

## 2022-01-02 PROCEDURE — 81001 URINALYSIS AUTO W/SCOPE: CPT | Performed by: STUDENT IN AN ORGANIZED HEALTH CARE EDUCATION/TRAINING PROGRAM

## 2022-01-02 PROCEDURE — 82436 ASSAY OF URINE CHLORIDE: CPT | Performed by: STUDENT IN AN ORGANIZED HEALTH CARE EDUCATION/TRAINING PROGRAM

## 2022-01-02 PROCEDURE — 80048 BASIC METABOLIC PNL TOTAL CA: CPT | Performed by: INTERNAL MEDICINE

## 2022-01-02 PROCEDURE — 25010000002 DESMOPRESSIN ACETATE PF 4 MCG/ML SOLUTION: Performed by: INTERNAL MEDICINE

## 2022-01-02 PROCEDURE — 84480 ASSAY TRIIODOTHYRONINE (T3): CPT | Performed by: INTERNAL MEDICINE

## 2022-01-02 PROCEDURE — 63710000001 INSULIN DETEMIR PER 5 UNITS: Performed by: EMERGENCY MEDICINE

## 2022-01-02 PROCEDURE — 82962 GLUCOSE BLOOD TEST: CPT

## 2022-01-02 PROCEDURE — 84479 ASSAY OF THYROID (T3 OR T4): CPT | Performed by: INTERNAL MEDICINE

## 2022-01-02 PROCEDURE — 84295 ASSAY OF SERUM SODIUM: CPT | Performed by: EMERGENCY MEDICINE

## 2022-01-02 PROCEDURE — 80179 DRUG ASSAY SALICYLATE: CPT | Performed by: EMERGENCY MEDICINE

## 2022-01-02 PROCEDURE — 86140 C-REACTIVE PROTEIN: CPT | Performed by: EMERGENCY MEDICINE

## 2022-01-02 PROCEDURE — 84300 ASSAY OF URINE SODIUM: CPT | Performed by: STUDENT IN AN ORGANIZED HEALTH CARE EDUCATION/TRAINING PROGRAM

## 2022-01-02 PROCEDURE — 84436 ASSAY OF TOTAL THYROXINE: CPT | Performed by: INTERNAL MEDICINE

## 2022-01-02 PROCEDURE — 83880 ASSAY OF NATRIURETIC PEPTIDE: CPT | Performed by: STUDENT IN AN ORGANIZED HEALTH CARE EDUCATION/TRAINING PROGRAM

## 2022-01-02 PROCEDURE — 84550 ASSAY OF BLOOD/URIC ACID: CPT | Performed by: STUDENT IN AN ORGANIZED HEALTH CARE EDUCATION/TRAINING PROGRAM

## 2022-01-02 PROCEDURE — 84443 ASSAY THYROID STIM HORMONE: CPT | Performed by: INTERNAL MEDICINE

## 2022-01-02 PROCEDURE — 85652 RBC SED RATE AUTOMATED: CPT | Performed by: EMERGENCY MEDICINE

## 2022-01-02 PROCEDURE — 85025 COMPLETE CBC W/AUTO DIFF WBC: CPT | Performed by: EMERGENCY MEDICINE

## 2022-01-02 RX ORDER — HYDROCHLOROTHIAZIDE 25 MG/1
25 TABLET ORAL DAILY
COMMUNITY
End: 2022-01-08 | Stop reason: HOSPADM

## 2022-01-02 RX ORDER — NICOTINE POLACRILEX 4 MG
15 LOZENGE BUCCAL
Status: DISCONTINUED | OUTPATIENT
Start: 2022-01-02 | End: 2022-01-08 | Stop reason: HOSPADM

## 2022-01-02 RX ORDER — POLYETHYLENE GLYCOL 3350 17 G/17G
17 POWDER, FOR SOLUTION ORAL DAILY PRN
Status: DISCONTINUED | OUTPATIENT
Start: 2022-01-02 | End: 2022-01-08 | Stop reason: HOSPADM

## 2022-01-02 RX ORDER — CLINDAMYCIN PHOSPHATE 600 MG/50ML
600 INJECTION INTRAVENOUS ONCE
Status: COMPLETED | OUTPATIENT
Start: 2022-01-02 | End: 2022-01-02

## 2022-01-02 RX ORDER — FLUTICASONE PROPIONATE 50 MCG
2 SPRAY, SUSPENSION (ML) NASAL DAILY PRN
COMMUNITY

## 2022-01-02 RX ORDER — DIPHENOXYLATE HYDROCHLORIDE AND ATROPINE SULFATE 2.5; .025 MG/1; MG/1
1 TABLET ORAL DAILY
Status: DISCONTINUED | OUTPATIENT
Start: 2022-01-02 | End: 2022-01-02

## 2022-01-02 RX ORDER — DESMOPRESSIN ACETATE 4 UG/ML
1 INJECTION, SOLUTION INTRAVENOUS; SUBCUTANEOUS ONCE
Status: COMPLETED | OUTPATIENT
Start: 2022-01-02 | End: 2022-01-02

## 2022-01-02 RX ORDER — AMLODIPINE BESYLATE 5 MG/1
10 TABLET ORAL NIGHTLY
Status: DISCONTINUED | OUTPATIENT
Start: 2022-01-02 | End: 2022-01-02

## 2022-01-02 RX ORDER — ROSUVASTATIN CALCIUM 20 MG/1
40 TABLET, COATED ORAL DAILY
Status: DISCONTINUED | OUTPATIENT
Start: 2022-01-02 | End: 2022-01-02

## 2022-01-02 RX ORDER — SPIRONOLACTONE 25 MG/1
25 TABLET ORAL DAILY
Status: DISCONTINUED | OUTPATIENT
Start: 2022-01-02 | End: 2022-01-02

## 2022-01-02 RX ORDER — ASPIRIN 325 MG
325 TABLET, DELAYED RELEASE (ENTERIC COATED) ORAL DAILY
Status: DISCONTINUED | OUTPATIENT
Start: 2022-01-02 | End: 2022-01-05

## 2022-01-02 RX ORDER — SODIUM CHLORIDE 0.9 % (FLUSH) 0.9 %
10 SYRINGE (ML) INJECTION EVERY 12 HOURS SCHEDULED
Status: DISCONTINUED | OUTPATIENT
Start: 2022-01-03 | End: 2022-01-08 | Stop reason: HOSPADM

## 2022-01-02 RX ORDER — DEXTROSE MONOHYDRATE 25 G/50ML
25 INJECTION, SOLUTION INTRAVENOUS
Status: DISCONTINUED | OUTPATIENT
Start: 2022-01-02 | End: 2022-01-08 | Stop reason: HOSPADM

## 2022-01-02 RX ORDER — NITROGLYCERIN 0.4 MG/1
0.4 TABLET SUBLINGUAL
Status: DISCONTINUED | OUTPATIENT
Start: 2022-01-02 | End: 2022-01-08 | Stop reason: HOSPADM

## 2022-01-02 RX ORDER — FERROUS SULFATE 325(65) MG
325 TABLET ORAL 2 TIMES DAILY
Status: DISCONTINUED | OUTPATIENT
Start: 2022-01-02 | End: 2022-01-02

## 2022-01-02 RX ORDER — COLCHICINE 0.6 MG/1
0.6 TABLET ORAL DAILY PRN
COMMUNITY
End: 2022-01-08 | Stop reason: HOSPADM

## 2022-01-02 RX ORDER — HYDRALAZINE HYDROCHLORIDE 25 MG/1
100 TABLET, FILM COATED ORAL 3 TIMES DAILY
Status: DISCONTINUED | OUTPATIENT
Start: 2022-01-02 | End: 2022-01-02

## 2022-01-02 RX ORDER — DIPHENOXYLATE HYDROCHLORIDE AND ATROPINE SULFATE 2.5; .025 MG/1; MG/1
1 TABLET ORAL DAILY
COMMUNITY

## 2022-01-02 RX ORDER — HYDROCODONE BITARTRATE AND ACETAMINOPHEN 5; 325 MG/1; MG/1
1 TABLET ORAL 2 TIMES DAILY PRN
Status: DISCONTINUED | OUTPATIENT
Start: 2022-01-02 | End: 2022-01-08 | Stop reason: HOSPADM

## 2022-01-02 RX ORDER — HYDROCHLOROTHIAZIDE 25 MG/1
25 TABLET ORAL DAILY
Status: DISCONTINUED | OUTPATIENT
Start: 2022-01-02 | End: 2022-01-02

## 2022-01-02 RX ORDER — ONDANSETRON 2 MG/ML
4 INJECTION INTRAMUSCULAR; INTRAVENOUS ONCE
Status: COMPLETED | OUTPATIENT
Start: 2022-01-02 | End: 2022-01-02

## 2022-01-02 RX ORDER — LOSARTAN POTASSIUM 50 MG/1
50 TABLET ORAL NIGHTLY
COMMUNITY

## 2022-01-02 RX ORDER — LOSARTAN POTASSIUM 25 MG/1
50 TABLET ORAL NIGHTLY
Status: DISCONTINUED | OUTPATIENT
Start: 2022-01-02 | End: 2022-01-02

## 2022-01-02 RX ORDER — CETIRIZINE HYDROCHLORIDE 10 MG/1
5 TABLET ORAL NIGHTLY
Status: DISCONTINUED | OUTPATIENT
Start: 2022-01-02 | End: 2022-01-02

## 2022-01-02 RX ORDER — INSULIN GLARGINE 100 [IU]/ML
25 INJECTION, SOLUTION SUBCUTANEOUS DAILY
COMMUNITY
End: 2022-01-08 | Stop reason: HOSPADM

## 2022-01-02 RX ORDER — SODIUM CHLORIDE 0.9 % (FLUSH) 0.9 %
10 SYRINGE (ML) INJECTION AS NEEDED
Status: DISCONTINUED | OUTPATIENT
Start: 2022-01-02 | End: 2022-01-08 | Stop reason: HOSPADM

## 2022-01-02 RX ORDER — LEVOTHYROXINE SODIUM 0.07 MG/1
75 TABLET ORAL
Status: DISCONTINUED | OUTPATIENT
Start: 2022-01-02 | End: 2022-01-08 | Stop reason: HOSPADM

## 2022-01-02 RX ORDER — HYDRALAZINE HYDROCHLORIDE 20 MG/ML
10 INJECTION INTRAMUSCULAR; INTRAVENOUS EVERY 6 HOURS PRN
Status: DISCONTINUED | OUTPATIENT
Start: 2022-01-02 | End: 2022-01-08 | Stop reason: HOSPADM

## 2022-01-02 RX ORDER — MULTIPLE VITAMINS W/ MINERALS TAB 9MG-400MCG
1 TAB ORAL 2 TIMES DAILY
COMMUNITY

## 2022-01-02 RX ORDER — CARVEDILOL 6.25 MG/1
6.25 TABLET ORAL 2 TIMES DAILY WITH MEALS
Status: DISCONTINUED | OUTPATIENT
Start: 2022-01-02 | End: 2022-01-02

## 2022-01-02 RX ORDER — HEPARIN SODIUM 5000 [USP'U]/ML
5000 INJECTION, SOLUTION INTRAVENOUS; SUBCUTANEOUS EVERY 12 HOURS SCHEDULED
Status: DISCONTINUED | OUTPATIENT
Start: 2022-01-03 | End: 2022-01-05

## 2022-01-02 RX ORDER — DEXTROSE MONOHYDRATE 50 MG/ML
125 INJECTION, SOLUTION INTRAVENOUS CONTINUOUS
Status: DISCONTINUED | OUTPATIENT
Start: 2022-01-02 | End: 2022-01-02

## 2022-01-02 RX ORDER — TERAZOSIN 5 MG/1
5 CAPSULE ORAL NIGHTLY
Status: DISCONTINUED | OUTPATIENT
Start: 2022-01-02 | End: 2022-01-02

## 2022-01-02 RX ORDER — CETIRIZINE HYDROCHLORIDE 10 MG/1
10 TABLET ORAL NIGHTLY
COMMUNITY

## 2022-01-02 RX ADMIN — ONDANSETRON 4 MG: 2 INJECTION INTRAMUSCULAR; INTRAVENOUS at 05:42

## 2022-01-02 RX ADMIN — SODIUM CHLORIDE 2 G: 9 INJECTION, SOLUTION INTRAVENOUS at 12:56

## 2022-01-02 RX ADMIN — INSULIN DETEMIR 25 UNITS: 100 INJECTION, SOLUTION SUBCUTANEOUS at 09:56

## 2022-01-02 RX ADMIN — CLINDAMYCIN IN 5 PERCENT DEXTROSE 600 MG: 12 INJECTION, SOLUTION INTRAVENOUS at 04:02

## 2022-01-02 RX ADMIN — DEXTROSE MONOHYDRATE 125 ML/HR: 50 INJECTION, SOLUTION INTRAVENOUS at 14:47

## 2022-01-02 RX ADMIN — SODIUM CHLORIDE 100 ML: 3 INJECTION, SOLUTION INTRAVENOUS at 00:04

## 2022-01-02 RX ADMIN — SODIUM CHLORIDE 2 G: 9 INJECTION, SOLUTION INTRAVENOUS at 19:47

## 2022-01-02 RX ADMIN — VANCOMYCIN HYDROCHLORIDE 1750 MG: 5 INJECTION, POWDER, LYOPHILIZED, FOR SOLUTION INTRAVENOUS at 10:05

## 2022-01-02 RX ADMIN — DESMOPRESSIN ACETATE 1 MCG: 4 INJECTION INTRAVENOUS; SUBCUTANEOUS at 14:47

## 2022-01-02 NOTE — CONSULTS
Nephrology Consultation Note    Referring Provider: Dr. Centeno  Reason for Consultation: Hyponatremia    Chief complaint altered mental status    Subjective .     History of present illness: Patient apparently developed an acute change in mental status over a period of a couple of hours staggered and fell.  Was brought to the emergency room for suspected stroke which was ruled out.  The history of altered mental status was very brief.  He did have a lot of vomiting I understand granddaughter is at the bedside and she is unable to elaborate on the patient's eating and drinking habits.  Emergency room physician did tell me that patient had been perfectly fine 24 hours prior to his arrival and in the morning of presentation.    Initial sodium was 114.  Patient had received some saline prior to that.  I did request the ER physician to get a stat proBNP and uric acid and this came back at 413 and 5.1 respectively.  Stat urine chloride was 57 and urine sodium 64.    We did elect to give the patient 100 mL of 3% saline which brought the sodium up to 119 at 6 AM and 121 at 8:38 AM and 1229 at 122.  This last set was ordered by me following my evaluation.  Patient has been effectively n.p.o. as he is not ate or drank anything.  He did have urinary retention and has made significant urine output following introduction of a Llamas's.  He remains confused.  He is moving all extremities.    Hemodynamic data reviewed and he has not been hypotensive    Medications with potential for contributing to hyponatremia include thiazides and other diuretics      History  Past Medical History:   Diagnosis Date   • BPH with urinary obstruction    • CHF (congestive heart failure) (HCC)    • Chronic kidney disease    • Diabetes mellitus (HCC)    • Hypertension    ,   Past Surgical History:   Procedure Laterality Date   • CARDIAC SURGERY     • CARDIAC VALVE REPLACEMENT     ,   Family History    Problem Relation Age of Onset   • No Known Problems Father    • No Known Problems Mother    ,   Social History     Tobacco Use   • Smoking status: Never Smoker   • Smokeless tobacco: Not on file   Substance Use Topics   • Alcohol use: No   • Drug use: No    and Allergies:  Keflex [cephalexin] and Sulfa antibiotics      Vital Signs   Temp:  [96.4 °F (35.8 °C)] 96.4 °F (35.8 °C)  Heart Rate:  [55-85] 55  Resp:  [22] 22  BP: (113-177)/(54-96) 113/96    Physical Exam:     General Appearance:   Restless and disoriented   Head:   Right periorbital bruise   Eyes:            no icterus, no pallor, pupils CCERLA   Ears:    Ears appear intact    Throat:   oral mucosa moist   Neck:   no JVD       Lungs:     Clear to auscultation,respirations regular, even and                  unlabored.  Throat sounds plus    Heart:    irregular rhythm and normal rate, normal S1 and S2   Chest Wall:    No abnormalities observed   Abdomen:     Normal bowel sounds, no tenderness   Rectal:     Deferred   Extremities:  No edema       Skin:   No petechiae       Neurologic:  Encephalopathy     Results Review:   I reviewed the patient's new clinical results.      Lab Results (last 24 hours)     Procedure Component Value Units Date/Time    Urine Drug Screen - Urine, Clean Catch [[id number]]  (Normal) Collected: 01/02/22 1344    Specimen: Urine, Clean Catch Updated: 01/02/22 1359     THC, Screen, Urine Negative     Phencyclidine (PCP), Urine Negative     Cocaine Screen, Urine Negative     Methamphetamine, Ur Negative     Opiate Screen Negative     Amphetamine Screen, Urine Negative     Benzodiazepine Screen, Urine Negative     Tricyclic Antidepressants Screen Negative     Methadone Screen, Urine Negative     Barbiturates Screen, Urine Negative     Oxycodone Screen, Urine Negative     Propoxyphene Screen Negative     Buprenorphine, Screen, Urine Negative    Narrative:      Cutoff For Drugs Screened:    Amphetamines               500 ng/ml  Barbiturates                200 ng/ml  Benzodiazepines            150 ng/ml  Cocaine                    150 ng/ml  Methadone                  200 ng/ml  Opiates                    100 ng/ml  Phencyclidine               25 ng/ml  THC                            50 ng/ml  Methamphetamine            500 ng/ml  Tricyclic Antidepressants  300 ng/ml  Oxycodone                  100 ng/ml  Propoxyphene               300 ng/ml  Buprenorphine               10 ng/ml    The normal value for all drugs tested is negative. This report includes unconfirmed screening results, with the cutoff values listed, to be used for medical treatment purposes only.  Unconfirmed results must not be used for non-medical purposes such as employment or legal testing.  Clinical consideration should be applied to any drug of abuse test, particularly when unconfirmed results are used.      Ethanol [[id number]] Collected: 01/02/22 1229    Specimen: Blood Updated: 01/02/22 1342     Ethanol <10 mg/dL      Ethanol % <0.010 %     Narrative:      >/= 80.0 legally intoxicated    Acetaminophen Level [[id number]]  (Normal) Collected: 01/02/22 1229    Specimen: Blood Updated: 01/02/22 1342     Acetaminophen <5.0 mcg/mL     Salicylate Level [[id number]]  (Normal) Collected: 01/02/22 1229    Specimen: Blood Updated: 01/02/22 1342     Salicylate <0.3 mg/dL     C-reactive Protein [[id number]]  (Abnormal) Collected: 01/02/22 1229    Specimen: Blood Updated: 01/02/22 1322     C-Reactive Protein 0.66 mg/dL     Sedimentation Rate [[id number]]  (Normal) Collected: 01/02/22 1310    Specimen: Blood Updated: 01/02/22 1318     Sed Rate 11 mm/hr     CBC & Differential [[id number]]  (Abnormal) Collected: 01/02/22 1310    Specimen: Blood Updated: 01/02/22 1315    Narrative:      The following orders were created for panel order CBC & Differential.  Procedure                               Abnormality         Status                     ---------                               -----------          ------                     CBC Auto Differential[[id number]]        Abnormal            Final result                 Please view results for these tests on the individual orders.    CBC Auto Differential [[id number]]  (Abnormal) Collected: 01/02/22 1310    Specimen: Blood Updated: 01/02/22 1315     WBC 13.22 10*3/mm3      RBC 3.30 10*6/mm3      Hemoglobin 10.3 g/dL      Hematocrit 31.0 %      MCV 93.9 fL      MCH 31.2 pg      MCHC 33.2 g/dL      RDW 13.2 %      RDW-SD 45.1 fl      MPV 9.6 fL      Platelets 148 10*3/mm3      Neutrophil % 80.5 %      Lymphocyte % 7.6 %      Monocyte % 10.5 %      Eosinophil % 0.6 %      Basophil % 0.3 %      Immature Grans % 0.5 %      Neutrophils, Absolute 10.65 10*3/mm3      Lymphocytes, Absolute 1.00 10*3/mm3      Monocytes, Absolute 1.39 10*3/mm3      Eosinophils, Absolute 0.08 10*3/mm3      Basophils, Absolute 0.04 10*3/mm3      Immature Grans, Absolute 0.06 10*3/mm3      nRBC 0.0 /100 WBC     Basic Metabolic Panel [[id number]]  (Abnormal) Collected: 01/02/22 1229    Specimen: Blood Updated: 01/02/22 1304     Glucose 120 mg/dL      BUN 20 mg/dL      Creatinine 1.50 mg/dL      Sodium 122 mmol/L      Potassium 4.2 mmol/L      Chloride 90 mmol/L      CO2 22.7 mmol/L      Calcium 9.3 mg/dL      eGFR Non African Amer 44 mL/min/1.73      BUN/Creatinine Ratio 13.3     Anion Gap 9.3 mmol/L     Narrative:      GFR Normal >60  Chronic Kidney Disease <60  Kidney Failure <15      POC Glucose Once [[id number]]  (Normal) Collected: 01/02/22 1142    Specimen: Blood Updated: 01/02/22 1148     Glucose 130 mg/dL      Comment: Meter: [id number] : [id number] CHARLES POLANCO       POC Glucose Once [[id number]]  (Abnormal) Collected: 01/02/22 0947    Specimen: Blood Updated: 01/02/22 0954     Glucose 140 mg/dL      Comment: Meter: [id number] : [id number] CHARLES POLANCO       Basic Metabolic Panel [[id number]]  (Abnormal) Collected: 01/02/22 0838    Specimen: Blood Updated: 01/02/22 0908     Glucose 146  mg/dL      BUN 21 mg/dL      Creatinine 1.46 mg/dL      Sodium 121 mmol/L      Potassium 4.4 mmol/L      Comment: Slight hemolysis detected by analyzer. Results may be affected.        Chloride 87 mmol/L      CO2 21.4 mmol/L      Calcium 9.4 mg/dL      eGFR Non African Amer 46 mL/min/1.73      BUN/Creatinine Ratio 14.4     Anion Gap 12.6 mmol/L     Narrative:      GFR Normal >60  Chronic Kidney Disease <60  Kidney Failure <15      Basic Metabolic Panel [[id number]]  (Abnormal) Collected: 01/02/22 0606    Specimen: Blood from Arm, Right Updated: 01/02/22 0641     Glucose 157 mg/dL      BUN 22 mg/dL      Creatinine 1.50 mg/dL      Sodium 119 mmol/L      Potassium 4.2 mmol/L      Comment: Slight hemolysis detected by analyzer. Results may be affected.        Chloride 86 mmol/L      CO2 19.7 mmol/L      Calcium 9.6 mg/dL      eGFR Non African Amer 44 mL/min/1.73      BUN/Creatinine Ratio 14.7     Anion Gap 13.3 mmol/L     Narrative:      GFR Normal >60  Chronic Kidney Disease <60  Kidney Failure <15      Basic Metabolic Panel [[id number]]  (Abnormal) Collected: 01/02/22 0135    Specimen: Blood from Arm, Left Updated: 01/02/22 0202     Glucose 176 mg/dL      BUN 22 mg/dL      Creatinine 1.48 mg/dL      Sodium 118 mmol/L      Potassium 4.2 mmol/L      Chloride 85 mmol/L      CO2 18.4 mmol/L      Calcium 9.5 mg/dL      eGFR Non African Amer 45 mL/min/1.73      BUN/Creatinine Ratio 14.9     Anion Gap 14.6 mmol/L     Narrative:      GFR Normal >60  Chronic Kidney Disease <60  Kidney Failure <15      Uric Acid [[id number]]  (Normal) Collected: 01/02/22 0135    Specimen: Blood from Arm, Left Updated: 01/02/22 0200     Uric Acid 5.1 mg/dL     BNP [[id number]]  (Normal) Collected: 01/02/22 0135    Specimen: Blood from Arm, Left Updated: 01/02/22 0159     proBNP 413.0 pg/mL     Narrative:      Among patients with dyspnea, NT-proBNP is highly sensitive for the detection of acute congestive heart failure. In addition NT-proBNP of <300  pg/ml effectively rules out acute congestive heart failure with 99% negative predictive value.    Results may be falsely decreased if patient taking Biotin.      Urinalysis, Microscopic Only - Urine, Catheter [[id number]] Collected: 01/02/22 0029    Specimen: Urine, Catheter Updated: 01/02/22 0055     RBC, UA 0-2 /HPF      WBC, UA 0-2 /HPF      Bacteria, UA None Seen /HPF      Squamous Epithelial Cells, UA 0-2 /HPF      Hyaline Casts, UA None Seen /LPF      Methodology Automated Microscopy    Urinalysis With Culture If Indicated - Urine, Catheter [[id number]]  (Abnormal) Collected: 01/02/22 0029    Specimen: Urine, Catheter Updated: 01/02/22 0055     Color, UA Yellow     Appearance, UA Clear     pH, UA 5.5     Specific Gravity, UA 1.008     Glucose, UA Negative     Ketones, UA Trace     Bilirubin, UA Negative     Blood, UA Negative     Protein,  mg/dL (2+)     Leuk Esterase, UA Negative     Nitrite, UA Negative     Urobilinogen, UA 0.2 E.U./dL    Urine Electrolytes - Urine, Catheter [[id number]] Collected: 01/02/22 0029    Specimen: Urine, Catheter Updated: 01/02/22 0036     Potassium, Urine 20.5 mmol/L      Sodium, Urine 64 mmol/L      Chloride, Urine 57 mmol/L     Narrative:      Reference intervals for random urine have not been established.  Clinical usage is dependent upon physician's interpretation in combination with other laboratory tests.       COVID-19 and FLU A/B PCR - Swab, Nasopharynx [[id number]]  (Normal) Collected: 01/01/22 2257    Specimen: Swab from Nasopharynx Updated: 01/01/22 2340     COVID19 Not Detected     Influenza A PCR Not Detected     Influenza B PCR Not Detected    Narrative:      Fact sheet for providers: https://www.fda.gov/media/655578/download    Fact sheet for patients: https://www.fda.gov/media/649940/download    Test performed by PCR.    Ammonia [[id number]]  (Normal) Collected: 01/01/22 2315    Specimen: Blood from Arm, Right Updated: 01/01/22 2339     Ammonia 17 umol/L       Comment: Specimen hemolyzed.  Results may be affected.       Manati Draw [[id number]] Collected: 01/01/22 2229    Specimen: Blood Updated: 01/01/22 2331    Narrative:      The following orders were created for panel order Manati Draw.  Procedure                               Abnormality         Status                     ---------                               -----------         ------                     Green Top (Gel)[[id number]]                                  Final result               Lavender Top[[id number]]                                     Final result               Gold Top - SST[[id number]]                                   Final result               Light Blue Top[[id number]]                                   Final result                 Please view results for these tests on the individual orders.    Light Blue Top [[id number]] Collected: 01/01/22 2229    Specimen: Blood Updated: 01/01/22 2331     Extra Tube hold for add-on     Comment: Auto resulted       Gold Top - SST [[id number]] Collected: 01/01/22 2229    Specimen: Blood Updated: 01/01/22 2331     Extra Tube Hold for add-ons.     Comment: Auto resulted.       Green Top (Gel) [[id number]] Collected: 01/01/22 2229    Specimen: Blood Updated: 01/01/22 2331     Extra Tube Hold for add-ons.     Comment: Auto resulted.       Lavender Top [[id number]] Collected: 01/01/22 2229    Specimen: Blood Updated: 01/01/22 2331     Extra Tube hold for add-on     Comment: Auto resulted       Blood Culture - Blood, Arm, Left [[id number]] Collected: 01/01/22 2327    Specimen: Blood from Arm, Left Updated: 01/01/22 2329    Sedimentation Rate [[id number]]  (Abnormal) Collected: 01/01/22 2229    Specimen: Blood Updated: 01/01/22 2322     Sed Rate 26 mm/hr     Blood Culture - Blood, Arm, Right [[id number]] Collected: 01/01/22 2315    Specimen: Blood from Arm, Right Updated: 01/01/22 2319    Blood Gas, Arterial With Co-Ox [[id number]]  (Abnormal) Collected: 01/01/22 2306     Specimen: Arterial Blood Updated: 01/01/22 2311     Site Left Radial     Aris's Test N/A     pH, Arterial 7.409 pH units      pCO2, Arterial 31.8 mm Hg      pO2, Arterial 68.6 mm Hg      Comment: 84 Value below reference range        HCO3, Arterial 20.0 mmol/L      Base Excess, Arterial -3.8 mmol/L      O2 Saturation, Arterial 94.0 %      Hemoglobin, Blood Gas 11.3 g/dL      Comment: 84 Value below reference range        Hematocrit, Blood Gas 34.7 %      Comment: 84 Value below reference range        Oxyhemoglobin 92.9 %      Comment: 84 Value below reference range        Methemoglobin 0.30 %      Carboxyhemoglobin 0.9 %      A-a Gradiant 36.8 mmHg      CO2 Content 21.0 mmol/L      Temperature 0.0 C      Barometric Pressure for Blood Gas 719 mmHg      Modality Room Air     FIO2 21 %      Ventilator Mode NA     Note --     Collected by [id number]     Comment: Meter: [id number]     :  [id number]        pH, Temp Corrected --     pCO2, Temperature Corrected --     pO2, Temperature Corrected --    Comprehensive Metabolic Panel [[id number]]  (Abnormal) Collected: 01/01/22 2229    Specimen: Blood Updated: 01/01/22 2307     Glucose 164 mg/dL      BUN 24 mg/dL      Creatinine 1.59 mg/dL      Sodium 114 mmol/L      Potassium 3.8 mmol/L      Chloride 82 mmol/L      CO2 18.5 mmol/L      Calcium 9.7 mg/dL      Total Protein 6.7 g/dL      Albumin 4.26 g/dL      ALT (SGPT) 22 U/L      AST (SGOT) 37 U/L      Alkaline Phosphatase 89 U/L      Total Bilirubin 0.5 mg/dL      eGFR Non African Amer 41 mL/min/1.73      Globulin 2.4 gm/dL      A/G Ratio 1.7 g/dL      BUN/Creatinine Ratio 15.1     Anion Gap 13.5 mmol/L     Narrative:      GFR Normal >60  Chronic Kidney Disease <60  Kidney Failure <15      Troponin [[id number]]  (Normal) Collected: 01/01/22 2229    Specimen: Blood Updated: 01/01/22 2258     Troponin T 0.013 ng/mL     Narrative:      Troponin T Reference Range:  <= 0.03 ng/mL-   Negative for AMI  >0.03 ng/mL-      Abnormal for myocardial necrosis.  Clinicians would have to utilize clinical acumen, EKG, Troponin and serial changes to determine if it is an Acute Myocardial Infarction or myocardial injury due to an underlying chronic condition.       Results may be falsely decreased if patient taking Biotin.      CK [[id number]]  (Abnormal) Collected: 01/01/22 2229    Specimen: Blood Updated: 01/01/22 2258     Creatine Kinase 345 U/L     C-reactive Protein [[id number]]  (Normal) Collected: 01/01/22 2229    Specimen: Blood Updated: 01/01/22 2258     C-Reactive Protein <0.30 mg/dL     Magnesium [[id number]]  (Normal) Collected: 01/01/22 2229    Specimen: Blood Updated: 01/01/22 2258     Magnesium 1.8 mg/dL     Lactic Acid, Plasma [[id number]]  (Normal) Collected: 01/01/22 2231    Specimen: Blood Updated: 01/01/22 2254     Lactate 1.5 mmol/L     Protime-INR [[id number]]  (Normal) Collected: 01/01/22 2229    Specimen: Blood Updated: 01/01/22 2250     Protime 12.8 Seconds      INR 0.92    Narrative:      Suggested INR therapeutic range for stable oral anticoagulant therapy:    Low Intensity therapy:   1.5-2.0  Moderate Intensity therapy:   2.0-3.0  High Intensity therapy:   2.5-4.0    aPTT [[id number]]  (Abnormal) Collected: 01/01/22 2229    Specimen: Blood Updated: 01/01/22 2250     PTT 52.4 seconds     Narrative:      PTT Heparin Therapeutic Range:  59 - 95 seconds      CBC & Differential [[id number]]  (Abnormal) Collected: 01/01/22 2229    Specimen: Blood Updated: 01/01/22 2238    Narrative:      The following orders were created for panel order CBC & Differential.  Procedure                               Abnormality         Status                     ---------                               -----------         ------                     CBC Auto Differential[[id number]]        Abnormal            Final result                 Please view results for these tests on the individual orders.    CBC Auto Differential [[id number]]  (Abnormal)  Collected: 01/01/22 2229    Specimen: Blood Updated: 01/01/22 2238     WBC 13.06 10*3/mm3      RBC 3.45 10*6/mm3      Hemoglobin 10.8 g/dL      Hematocrit 32.3 %      MCV 93.6 fL      MCH 31.3 pg      MCHC 33.4 g/dL      RDW 13.2 %      RDW-SD 45.1 fl      MPV 10.0 fL      Platelets 175 10*3/mm3      Neutrophil % 78.5 %      Lymphocyte % 10.3 %      Monocyte % 8.4 %      Eosinophil % 2.0 %      Basophil % 0.3 %      Immature Grans % 0.5 %      Neutrophils, Absolute 10.26 10*3/mm3      Lymphocytes, Absolute 1.34 10*3/mm3      Monocytes, Absolute 1.10 10*3/mm3      Eosinophils, Absolute 0.26 10*3/mm3      Basophils, Absolute 0.04 10*3/mm3      Immature Grans, Absolute 0.06 10*3/mm3      nRBC 0.0 /100 WBC           Imaging Results (Last 24 Hours)     Procedure Component Value Units Date/Time    CT Abdomen Pelvis Without Contrast [[id number]] Collected: 01/01/22 2301     Updated: 01/01/22 2303    Narrative:      CT ABDOMEN AND PELVIS, NONCONTRAST, 1/1/2022    HISTORY:  86-year-old male in the ED with acute mental status change, nausea and vomiting.    TECHNIQUE:  CT imaging of the abdomen and pelvis without oral or IV contrast. Radiation dose reduction techniques included automated exposure control. Radiation audit for CT and nuclear cardiology exams in the last 12 months: 0.    ABDOMEN FINDINGS:  Significant technical limitations related patient motion artifact and restricted positioning.    Tiny stone material within nondistended gallbladder. No bile duct dilatation. Liver, pancreas and spleen are within normal limits.    The kidneys are unobstructed. Normal caliber abdominal aorta. Normal adrenal glands.    Small bowel and colon are normal in caliber and appearance, as imaged. Normal appendix. No gastric distention. Small hiatal hernia.    PELVIS FINDINGS:  Moderately distended urinary bladder with only mild prostate enlargement. Rectum is negative. Small indirect fat-containing left inguinal hernia. No free pelvic  fluid.      Impression:      1.  No acute abnormality within the abdomen or pelvis. Technical limitations as noted.  2.  Cholelithiasis.  3.  Hiatal hernia.  4.  Distended urinary bladder. No evidence of upper urinary tract obstruction.    Signer Name: Jimmy Dallas MD   Signed: 1/1/2022 11:01 PM   Workstation Name: [facility]    Radiology [facility]    CT Chest Without Contrast Diagnostic [[id number]] Collected: 01/01/22 2259     Updated: 01/01/22 2301    Narrative:      CT CHEST, NONCONTRAST, 1/1/2022    HISTORY:  86-year-old male in the ED with acute mental status change, nausea and vomiting.    TECHNIQUE:  CT imaging of the chest ordered without IV contrast. Radiation dose reduction techniques included automated exposure control. Radiation audit for CT and nuclear cardiology exams in the last 12 months: 0.    FINDINGS:  Mild patchy infiltrates in both lung bases along with basilar atelectasis. The nondependent portions of both lungs are clear.    Prior transcatheter aortic valve replacement. CABG. Mild cardiomegaly. No pericardial effusion. Normal caliber thoracic aorta.    There is a suspicious irregular mass in the posterior left costophrenic angle measuring 2.2 x 2.0 cm. Malignancy is not excluded. Comparison with any available prior outside studies is recommended.    Small hiatal hernia with mild thoracic esophageal dilatation.      Impression:      1.  Mild infiltrate and/or atelectasis in the dependent posterior lung bases.  2.  Mild cardiomegaly. CABG. TAVR.  3.  Small suspicious irregular mass in the posterior left costophrenic angle. This measures up to 2.2 cm. Recommend short interval follow-up chest CT in 2-3 months. This persists, a follow-up PET/CT examination is recommended.    Signer Name: Jimmy Dallas MD   Signed: 1/1/2022 10:59 PM   Workstation Name: [facility]    Radiology [facility]    CT Head Without Contrast Stroke Protocol [[id number]] Collected:  01/01/22 2233     Updated: 01/01/22 2236    Narrative:      CT Head WO Code Stroke    HISTORY:   Altered mental status tonight    TECHNIQUE:   Axial unenhanced head CT. Radiation dose reduction techniques included automated exposure control or exposure modulation based on body size. Count of known CT and cardiac nuc med studies performed in previous 12 months: 0.     Time of scan: 20 3:00 PM    COMPARISON:   None.    FINDINGS:   No intracranial hemorrhage, mass, or infarct. No hydrocephalus or extra-axial fluid collection. There is prominence of the lateral ventricles out of portion to the sulci. There are no masses or extra-axial fluid collections or hemorrhage. Third ventricle  is also prominent. The skull base, calvarium, and extracranial soft tissues are normal.      Impression:      No acute findings. There is prominence of the lateral and third ventricles suggesting normal pressure hydrocephalus. Fourth ventricle is normal..          Signer Name: Polo Norman MD   Signed: 1/1/2022 10:33 PM   Workstation Name: [facility]    [facility]                    Assessment and Plan:    1.  Acute hyponatremia  2.  Metabolic encephalopathy  3.  Lung mass  4.  Hypertension  5.  Leucoytosis  6.  Type 2 diabetes    His sodium is corrected by 8 mEq in a matter of 14 hours.  While it is probably okay if this is acute hyponatremia, it is hard to explain why he developed acute hyponatremia in the absence of a precipitating events I am going to resume that there is some element of chronicity in this especially since he has been on thiazides.    I will go ahead and give him 500 mL of D5W and 1 mcg of DDAVP and repeat sodium after that.  I have stopped all his oral medications practically and put him on as needed IV hydralazine for blood pressure as he is unable to swallow from his confusion.    I have discussed the case with Dr. Nagy who is the ER physician on duty at this time and recommended that we  consider a spinal tap to rule out meningitis as with an 8 mEq improvement in sodium I would have expected some improvement in his confusion and disorientation which I do not see.    Multiple discussions were held with Dr. Centeno following presentation and over the course of the night.  Critical care rendered for 40 minutes.    Cortisol and thyroid profile ordered as well                Yaya Rooney MD  01/02/22  15:35 EST

## 2022-01-02 NOTE — ED NOTES
Called [facility] for possible transfer, spoke with [facility], stated they are on divert at this time.     Maggy Hannah  01/02/22 [id number]

## 2022-01-02 NOTE — ED NOTES
Pt taken to CT on EMS stretcher with primary nurse and ERT at this time     Shayla Whyte RN  01/01/22 8943

## 2022-01-02 NOTE — ED PROVIDER NOTES
Subjective   History of Present Illness  This 86-year-old male with a past medical history significant for BPH UTI, CHF, CKD, type 2 diabetes, chronic opioid use, history of cardiac valve replacement presented to the emergency department today for evaluation of altered mental status.  His daughter is with him.  She is a medical professional.  She tells me that he called at 630 at that time he was acting normally but was complaining of some abdominal pain and GI distress in the epigastric region.  He was having hot flashes.  The patient was staying with his other daughter who called to report that at about 830 he had become unsteady while walking and fallen into a wall when he obtained a right forearm skin tear.  Apparently since that time he has been confused.  He has been vomiting uncontrollably.  EMS reports that he may have been having some left-sided weakness at the scene.  The patient is not answering questions.  He is obtunded  Review of Systems   Unable to perform ROS: Mental status change       Past Medical History:   Diagnosis Date   • BPH with urinary obstruction    • CHF (congestive heart failure) ([facility])    • Chronic kidney disease    • Diabetes mellitus (HCC)    • Hypertension        Allergies   Allergen Reactions   • Keflex [Cephalexin] Anaphylaxis and Hives   • Sulfa Antibiotics Hives and GI Intolerance       Past Surgical History:   Procedure Laterality Date   • CARDIAC SURGERY     • CARDIAC VALVE REPLACEMENT         Family History   Problem Relation Age of Onset   • No Known Problems Father    • No Known Problems Mother        Social History     Socioeconomic History   • Marital status:    Tobacco Use   • Smoking status: Never Smoker   Substance and Sexual Activity   • Alcohol use: No   • Drug use: No           Objective   Physical Exam  Vitals and nursing note reviewed.   Constitutional:       General: He is in acute distress.      Appearance: He is well-developed. He is ill-appearing.   HENT:       Head: Normocephalic and atraumatic.      Right Ear: External ear normal.      Left Ear: External ear normal.      Nose: Nose normal.      Mouth/Throat:      Mouth: Mucous membranes are moist.      Pharynx: Oropharynx is clear.   Eyes:      Extraocular Movements: Extraocular movements intact.      Pupils: Pupils are equal, round, and reactive to light.   Cardiovascular:      Rate and Rhythm: Normal rate and regular rhythm.      Heart sounds: Normal heart sounds.   Pulmonary:      Effort: Pulmonary effort is normal.      Breath sounds: Normal breath sounds.   Abdominal:      General: Bowel sounds are normal.      Palpations: Abdomen is soft.   Musculoskeletal:         General: Normal range of motion.      Cervical back: Normal range of motion and neck supple.   Skin:     General: Skin is warm and dry.      Capillary Refill: Capillary refill takes less than 2 seconds.   Neurological:      General: No focal deficit present.      Mental Status: He is lethargic and disoriented.      GCS: GCS eye subscore is 4. GCS verbal subscore is 2. GCS motor subscore is 4.      Cranial Nerves: No cranial nerve deficit or facial asymmetry.      Sensory: Sensation is intact.      Motor: Motor function is intact.      Comments: Patient is moving all 4 extremities spontaneously does withdraw from pain.  He has spontaneous eye opening he has any incomprehensible verbal responses improved over his stay in the emergency department on serial examination.  No focal deficits noted.  He has no facial asymmetry, cranial nerves II through X intact.   Psychiatric:         Behavior: Behavior normal.         Thought Content: Thought content normal.         Judgment: Judgment normal.         Critical Care  Performed by: Avinash Centeno DO  Authorized by: Avinash Centeno DO     Critical care provider statement:     Critical care time (minutes):  60    Critical care was necessary to treat or prevent imminent or life-threatening deterioration  of the following conditions:  Endocrine crisis, metabolic crisis and CNS failure or compromise    Critical care was time spent personally by me on the following activities:  Blood draw for specimens, development of treatment plan with patient or surrogate, discussions with consultants, evaluation of patient's response to treatment, examination of patient, obtaining history from patient or surrogate, re-evaluation of patient's condition, pulse oximetry, ordering and review of radiographic studies, ordering and review of laboratory studies, ordering and performing treatments and interventions and review of old charts               ED Course  ED Course as of 01/04/22 2158   Sat Jan 01, 2022   2212 Code stroke called at time of arrival. [JM]   2235 I have spoken with nephrology, Dr Rooney.  Patient will be brought back to the room.  Vital signs recheck, small fluid bolus.  Will obtain MRI as soon as possible once the patient is stable for transport. [JM]   2358 BNP, BMP, Uric acid repeated 30 minutes after hypertonic saline   [JM]   Sun Jan 02, 2022   0012 Nephrology on board [JM]   0049 KG interpretation, ventricular rate 76, , , QTc 519, sinus rhythm with sinus arrhythmia first-degree AV block.  Bundle branch block. [JM]   0138 Is showing some signs of clinical improvement.  Repeat BMP pending for sodium level. [JM]   0236 Nephrology recommends BMPs every 4 hours 1000 mL fluid restriction daily. [JM]   0613 Patient has had no worsening overnight. [JM]   0950 CT Head Without Contrast Stroke Protocol  IMPRESSION:  No acute findings. There is prominence of the lateral and third ventricles suggesting normal pressure hydrocephalus. Fourth ventricle is normal.. [ES]   0951 CT Chest Without Contrast Diagnostic  IMPRESSION:  1.  Mild infiltrate and/or atelectasis in the dependent posterior lung bases.  2.  Mild cardiomegaly. CABG. TAVR.  3.  Small suspicious irregular mass in the posterior left costophrenic angle.  This measures up to 2.2 cm. Recommend short interval follow-up chest CT in 2-3 months. This persists, a follow-up PET/CT examination is recommended. [ES]   0951 CT Abdomen Pelvis Without Contrast  IMPRESSION:  1.  No acute abnormality within the abdomen or pelvis. Technical limitations as noted.  2.  Cholelithiasis.  3.  Hiatal hernia.  4.  Distended urinary bladder. No evidence of upper urinary tract obstruction. [ES]   2054 Patient mental status has improved back to baseline per POA at bedside.  Patient is AAOx4 with GCS 15.  Lumbar Puncture was recommended/offered for complete evaluation of possible Meningitis and or Normal Pressure Hydrocephalus.  POA and Patient declined/refused.  Informed of risks, which included worsening mental status and even sudden death.  Patient nor POA felt it was indicated and or necessary at this time.  Informed them [facility] did not have Neurology on staff, and no further Neurological workup would be performed or LP once admitted.  POA fully aware and understandable.  Again, declined to have LP performed, and reports her father is back to his baseline Mental Status at this time.   [ES]      ED Course User Index  [ES] Seth Nagy MD  [JM] Avinash Centeno, DO                                                 MDM  Number of Diagnoses or Management Options  Hyponatremia: new and requires workup     Amount and/or Complexity of Data Reviewed  Clinical lab tests: reviewed and ordered  Tests in the radiology section of CPT®: reviewed and ordered  Tests in the medicine section of CPT®: reviewed and ordered  Decide to obtain previous medical records or to obtain history from someone other than the patient: yes  Obtain history from someone other than the patient: yes  Review and summarize past medical records: yes  Independent visualization of images, tracings, or specimens: yes    Risk of Complications, Morbidity, and/or Mortality  Presenting problems:  high  Diagnostic procedures: high  Management options: high    Critical Care  Total time providing critical care: > 105 minutes    Patient Progress  Patient progress: stable      Final diagnoses:   Hyponatremia       ED Disposition  ED Disposition     ED Disposition Condition Comment    Decision to Admit  Level of Care: Telemetry [5]   Diagnosis: Hyponatremia [198519]   Admitting Physician: TRAVIS FARFAN [[id number]]   Certification: I Certify That Inpatient Hospital Services Are Medically Necessary For Greater Than 2 Midnights            No follow-up provider specified.       Medication List      ASK your doctor about these medications    cetirizine 10 MG tablet  Commonly known as: zyrTEC  Ask about: Which instructions should I use?     insulin glargine 100 UNIT/ML injection  Commonly known as: DAVE KUHN  Ask about: Which instructions should I use?     losartan 50 MG tablet  Commonly known as: COZAAR  Ask about: Which instructions should I use?             Seth Nagy MD  01/04/22 1145

## 2022-01-03 ENCOUNTER — APPOINTMENT (OUTPATIENT)
Dept: GENERAL RADIOLOGY | Facility: HOSPITAL | Age: 87
End: 2022-01-03

## 2022-01-03 LAB
ALBUMIN SERPL-MCNC: 3.69 G/DL (ref 3.5–5.2)
ALBUMIN/GLOB SERPL: 1.7 G/DL
ALP SERPL-CCNC: 67 U/L (ref 39–117)
ALT SERPL W P-5'-P-CCNC: 23 U/L (ref 1–41)
ANION GAP SERPL CALCULATED.3IONS-SCNC: 9.4 MMOL/L (ref 5–15)
ANION GAP SERPL CALCULATED.3IONS-SCNC: 9.4 MMOL/L (ref 5–15)
AST SERPL-CCNC: 49 U/L (ref 1–40)
BASOPHILS # BLD AUTO: 0.03 10*3/MM3 (ref 0–0.2)
BASOPHILS NFR BLD AUTO: 0.2 % (ref 0–1.5)
BILIRUB SERPL-MCNC: 0.3 MG/DL (ref 0–1.2)
BUN SERPL-MCNC: 18 MG/DL (ref 8–23)
BUN SERPL-MCNC: 18 MG/DL (ref 8–23)
BUN/CREAT SERPL: 12.3 (ref 7–25)
BUN/CREAT SERPL: 12.3 (ref 7–25)
CALCIUM SPEC-SCNC: 9 MG/DL (ref 8.6–10.5)
CALCIUM SPEC-SCNC: 9 MG/DL (ref 8.6–10.5)
CHLORIDE SERPL-SCNC: 92 MMOL/L (ref 98–107)
CHLORIDE SERPL-SCNC: 92 MMOL/L (ref 98–107)
CO2 SERPL-SCNC: 19.6 MMOL/L (ref 22–29)
CO2 SERPL-SCNC: 19.6 MMOL/L (ref 22–29)
CORTIS AM PEAK SERPL-MCNC: 16.64 MCG/DL
CREAT SERPL-MCNC: 1.46 MG/DL (ref 0.76–1.27)
CREAT SERPL-MCNC: 1.46 MG/DL (ref 0.76–1.27)
DEPRECATED RDW RBC AUTO: 47.8 FL (ref 37–54)
EOSINOPHIL # BLD AUTO: 0.3 10*3/MM3 (ref 0–0.4)
EOSINOPHIL NFR BLD AUTO: 2.4 % (ref 0.3–6.2)
ERYTHROCYTE [DISTWIDTH] IN BLOOD BY AUTOMATED COUNT: 13.4 % (ref 12.3–15.4)
GFR SERPL CREATININE-BSD FRML MDRD: 46 ML/MIN/1.73
GFR SERPL CREATININE-BSD FRML MDRD: 46 ML/MIN/1.73
GLOBULIN UR ELPH-MCNC: 2.2 GM/DL
GLUCOSE BLDC GLUCOMTR-MCNC: 139 MG/DL (ref 70–130)
GLUCOSE BLDC GLUCOMTR-MCNC: 195 MG/DL (ref 70–130)
GLUCOSE SERPL-MCNC: 116 MG/DL (ref 65–99)
GLUCOSE SERPL-MCNC: 116 MG/DL (ref 65–99)
HCT VFR BLD AUTO: 29.2 % (ref 37.5–51)
HGB BLD-MCNC: 9.6 G/DL (ref 13–17.7)
IMM GRANULOCYTES # BLD AUTO: 0.06 10*3/MM3 (ref 0–0.05)
IMM GRANULOCYTES NFR BLD AUTO: 0.5 % (ref 0–0.5)
LYMPHOCYTES # BLD AUTO: 0.97 10*3/MM3 (ref 0.7–3.1)
LYMPHOCYTES NFR BLD AUTO: 7.7 % (ref 19.6–45.3)
MCH RBC QN AUTO: 31.7 PG (ref 26.6–33)
MCHC RBC AUTO-ENTMCNC: 32.9 G/DL (ref 31.5–35.7)
MCV RBC AUTO: 96.4 FL (ref 79–97)
MONOCYTES # BLD AUTO: 1.51 10*3/MM3 (ref 0.1–0.9)
MONOCYTES NFR BLD AUTO: 12 % (ref 5–12)
NEUTROPHILS NFR BLD AUTO: 77.2 % (ref 42.7–76)
NEUTROPHILS NFR BLD AUTO: 9.68 10*3/MM3 (ref 1.7–7)
NRBC BLD AUTO-RTO: 0 /100 WBC (ref 0–0.2)
PLATELET # BLD AUTO: 164 10*3/MM3 (ref 140–450)
PMV BLD AUTO: 9.9 FL (ref 6–12)
POTASSIUM SERPL-SCNC: 4.1 MMOL/L (ref 3.5–5.2)
POTASSIUM SERPL-SCNC: 4.1 MMOL/L (ref 3.5–5.2)
PROT SERPL-MCNC: 5.9 G/DL (ref 6–8.5)
QT INTERVAL: 462 MS
QTC INTERVAL: 519 MS
RBC # BLD AUTO: 3.03 10*6/MM3 (ref 4.14–5.8)
SODIUM SERPL-SCNC: 117 MMOL/L (ref 136–145)
SODIUM SERPL-SCNC: 117 MMOL/L (ref 136–145)
SODIUM SERPL-SCNC: 119 MMOL/L (ref 136–145)
SODIUM SERPL-SCNC: 121 MMOL/L (ref 136–145)
SODIUM SERPL-SCNC: 121 MMOL/L (ref 136–145)
SODIUM SERPL-SCNC: 122 MMOL/L (ref 136–145)
WBC NRBC COR # BLD: 12.55 10*3/MM3 (ref 3.4–10.8)

## 2022-01-03 PROCEDURE — 82962 GLUCOSE BLOOD TEST: CPT

## 2022-01-03 PROCEDURE — 80053 COMPREHEN METABOLIC PANEL: CPT | Performed by: INTERNAL MEDICINE

## 2022-01-03 PROCEDURE — 71045 X-RAY EXAM CHEST 1 VIEW: CPT | Performed by: RADIOLOGY

## 2022-01-03 PROCEDURE — 25010000002 HYDRALAZINE PER 20 MG: Performed by: INTERNAL MEDICINE

## 2022-01-03 PROCEDURE — 25010000002 DIPHENHYDRAMINE PER 50 MG: Performed by: PHYSICIAN ASSISTANT

## 2022-01-03 PROCEDURE — 99223 1ST HOSP IP/OBS HIGH 75: CPT | Performed by: INTERNAL MEDICINE

## 2022-01-03 PROCEDURE — 82533 TOTAL CORTISOL: CPT | Performed by: INTERNAL MEDICINE

## 2022-01-03 PROCEDURE — 25010000002 HEPARIN (PORCINE) PER 1000 UNITS: Performed by: INTERNAL MEDICINE

## 2022-01-03 PROCEDURE — 94640 AIRWAY INHALATION TREATMENT: CPT

## 2022-01-03 PROCEDURE — 97161 PT EVAL LOW COMPLEX 20 MIN: CPT

## 2022-01-03 PROCEDURE — 92610 EVALUATE SWALLOWING FUNCTION: CPT

## 2022-01-03 PROCEDURE — 83930 ASSAY OF BLOOD OSMOLALITY: CPT | Performed by: INTERNAL MEDICINE

## 2022-01-03 PROCEDURE — 71045 X-RAY EXAM CHEST 1 VIEW: CPT

## 2022-01-03 PROCEDURE — 84295 ASSAY OF SERUM SODIUM: CPT | Performed by: INTERNAL MEDICINE

## 2022-01-03 PROCEDURE — 93010 ELECTROCARDIOGRAM REPORT: CPT | Performed by: INTERNAL MEDICINE

## 2022-01-03 PROCEDURE — 93005 ELECTROCARDIOGRAM TRACING: CPT | Performed by: INTERNAL MEDICINE

## 2022-01-03 PROCEDURE — 94799 UNLISTED PULMONARY SVC/PX: CPT

## 2022-01-03 PROCEDURE — 85025 COMPLETE CBC W/AUTO DIFF WBC: CPT | Performed by: INTERNAL MEDICINE

## 2022-01-03 RX ORDER — IPRATROPIUM BROMIDE AND ALBUTEROL SULFATE 2.5; .5 MG/3ML; MG/3ML
3 SOLUTION RESPIRATORY (INHALATION) EVERY 6 HOURS PRN
Status: DISCONTINUED | OUTPATIENT
Start: 2022-01-03 | End: 2022-01-08 | Stop reason: HOSPADM

## 2022-01-03 RX ORDER — DIPHENHYDRAMINE HYDROCHLORIDE 50 MG/ML
50 INJECTION INTRAMUSCULAR; INTRAVENOUS ONCE
Status: COMPLETED | OUTPATIENT
Start: 2022-01-03 | End: 2022-01-03

## 2022-01-03 RX ORDER — 3% SODIUM CHLORIDE 3 G/100ML
50 INJECTION, SOLUTION INTRAVENOUS ONCE
Status: COMPLETED | OUTPATIENT
Start: 2022-01-03 | End: 2022-01-03

## 2022-01-03 RX ADMIN — DIPHENHYDRAMINE HYDROCHLORIDE 50 MG: 50 INJECTION, SOLUTION INTRAMUSCULAR; INTRAVENOUS at 22:37

## 2022-01-03 RX ADMIN — ASPIRIN 325 MG: 325 TABLET, COATED ORAL at 08:29

## 2022-01-03 RX ADMIN — SODIUM CHLORIDE 2 G: 9 INJECTION, SOLUTION INTRAVENOUS at 10:26

## 2022-01-03 RX ADMIN — SODIUM CHLORIDE, PRESERVATIVE FREE 10 ML: 5 INJECTION INTRAVENOUS at 08:29

## 2022-01-03 RX ADMIN — METRONIDAZOLE 500 MG: 500 INJECTION, SOLUTION INTRAVENOUS at 23:48

## 2022-01-03 RX ADMIN — LEVOTHYROXINE SODIUM 75 MCG: 0.07 TABLET ORAL at 04:29

## 2022-01-03 RX ADMIN — SODIUM CHLORIDE, PRESERVATIVE FREE 10 ML: 5 INJECTION INTRAVENOUS at 20:04

## 2022-01-03 RX ADMIN — METRONIDAZOLE 500 MG: 500 INJECTION, SOLUTION INTRAVENOUS at 08:29

## 2022-01-03 RX ADMIN — METRONIDAZOLE 500 MG: 500 INJECTION, SOLUTION INTRAVENOUS at 17:14

## 2022-01-03 RX ADMIN — HYDROCODONE BITARTRATE AND ACETAMINOPHEN 1 TABLET: 5; 325 TABLET ORAL at 20:04

## 2022-01-03 RX ADMIN — HYDRALAZINE HYDROCHLORIDE 10 MG: 20 INJECTION INTRAMUSCULAR; INTRAVENOUS at 04:27

## 2022-01-03 RX ADMIN — HEPARIN SODIUM 5000 UNITS: 5000 INJECTION INTRAVENOUS; SUBCUTANEOUS at 01:08

## 2022-01-03 RX ADMIN — HEPARIN SODIUM 5000 UNITS: 5000 INJECTION INTRAVENOUS; SUBCUTANEOUS at 08:29

## 2022-01-03 RX ADMIN — IPRATROPIUM BROMIDE AND ALBUTEROL SULFATE 3 ML: .5; 3 SOLUTION RESPIRATORY (INHALATION) at 22:09

## 2022-01-03 RX ADMIN — SODIUM CHLORIDE 2 G: 9 INJECTION, SOLUTION INTRAVENOUS at 02:26

## 2022-01-03 RX ADMIN — SODIUM CHLORIDE, PRESERVATIVE FREE 10 ML: 5 INJECTION INTRAVENOUS at 01:08

## 2022-01-03 RX ADMIN — HEPARIN SODIUM 5000 UNITS: 5000 INJECTION INTRAVENOUS; SUBCUTANEOUS at 20:04

## 2022-01-03 RX ADMIN — SODIUM CHLORIDE 2 G: 9 INJECTION, SOLUTION INTRAVENOUS at 18:03

## 2022-01-03 RX ADMIN — SODIUM CHLORIDE 50 ML/HR: 3 INJECTION, SOLUTION INTRAVENOUS at 15:42

## 2022-01-03 NOTE — NURSING NOTE
Received call from lab; pt's sodium 117.   Called Dr. Armas and made him aware of pt's sodium level. He instructed me to follow the protocol he placed every 4hrs based off the pt's sodium level. Will order and start 3% sodium chloride and infuse 100mL over 2hrs.

## 2022-01-03 NOTE — PLAN OF CARE
Goal Outcome Evaluation:  Plan of Care Reviewed With: patient, daughter           Outcome Summary: PT eval complete.  Pt required Nelly and FWW for mobility.  Ambulated 80ft.  Daughter reports pt has 24/7 care and they are able to assist him at home.  Agreeable for pt to return home with home health therapy.

## 2022-01-03 NOTE — PROGRESS NOTES
Nephrology Progress Note      Subjective     Patient denied chest pain or shortness of breath, metal status has been better since yesterday per staff. He is not fully oriented though, but answered questions to ROS.     Objective       Vital signs :     Temp:  [97.2 °F (36.2 °C)-99.4 °F (37.4 °C)] 98 °F (36.7 °C)  Heart Rate:  [71-86] 86  Resp:  [18-20] 18  BP: (113-167)/(52-98) 148/66      Intake/Output Summary (Last 24 hours) at 1/3/2022 1119  Last data filed at 1/3/2022 0950  Gross per 24 hour   Intake 1880 ml   Output 700 ml   Net 1180 ml       Physical Exam:    General Appearance : not in acute distress  Lungs : clear to auscultation, respirations regular  Heart :  regular rhythm & normal rate, normal S1, S2 and no murmur, no rub  Abdomen : normal bowel sounds, no masses, no hepatomegaly, no splenomegaly, soft non-tender and no guarding  Extremities : moves extremities well, no edema, no cyanosis and no redness  Neurologic :  Unable to assess., not fully cooperative.   Acess :       Laboratory Data :     Albumin Albumin   Date Value Ref Range Status   01/03/2022 3.69 3.50 - 5.20 g/dL Final   01/01/2022 4.26 3.50 - 5.20 g/dL Final      Magnesium Magnesium   Date Value Ref Range Status   01/01/2022 1.8 1.6 - 2.4 mg/dL Final          PTH               No results found for: PTH    CBC and coagulation:  Results from last 7 days   Lab Units 01/03/22  0538 01/02/22  1310 01/02/22  1229 01/01/22  2231 01/01/22  2229   LACTATE mmol/L  --   --   --  1.5  --    SED RATE mm/hr  --  11  --   --  26*   CRP mg/dL  --   --  0.66*  --  <0.30   WBC 10*3/mm3 12.55* 13.22*  --   --  13.06*   HEMOGLOBIN g/dL 9.6* 10.3*  --   --  10.8*   HEMATOCRIT % 29.2* 31.0*  --   --  32.3*   MCV fL 96.4 93.9  --   --  93.6   MCHC g/dL 32.9 33.2  --   --  33.4   PLATELETS 10*3/mm3 164 148  --   --  175   INR   --   --   --   --  0.92     Acid/base balance:  Results from last 7 days   Lab Units 01/01/22  2306   PH, ARTERIAL pH units 7.409   PO2  ART mm Hg 68.6*   PCO2, ARTERIAL mm Hg 31.8*   HCO3 ART mmol/L 20.0     Renal and electrolytes:  Results from last 7 days   Lab Units 01/03/22  0538 01/02/22  2334 01/02/22  1843 01/02/22  1229 01/02/22  0838 01/02/22  0838 01/02/22  0606 01/02/22  0606 01/02/22  0135 01/01/22  2229   SODIUM mmol/L 121*  121* 122* 122* 122*  --  121*   < > 119*   < > 114*   POTASSIUM mmol/L 4.1  4.1  --  4.1 4.2   < > 4.4   < > 4.2   < > 3.8   MAGNESIUM mg/dL  --   --   --   --   --   --   --   --   --  1.8   CHLORIDE mmol/L 92*  92*  --  89* 90*   < > 87*   < > 86*   < > 82*   CO2 mmol/L 19.6*  19.6*  --  21.0* 22.7   < > 21.4*   < > 19.7*   < > 18.5*   BUN mg/dL 18  18  --  18 20   < > 21   < > 22   < > 24*   CREATININE mg/dL 1.46*  1.46*  --  1.42* 1.50*  --  1.46*  --  1.50*   < > 1.59*   EGFR IF NONAFRICN AM mL/min/1.73 46*  46*  --  47* 44*   < > 46*   < > 44*   < > 41*   CALCIUM mg/dL 9.0  9.0  --  9.2 9.3   < > 9.4   < > 9.6   < > 9.7    < > = values in this interval not displayed.     Estimated Creatinine Clearance: 38.5 mL/min (A) (by C-G formula based on SCr of 1.46 mg/dL (H)).    Liver and pancreatic function:  Results from last 7 days   Lab Units 01/03/22  0538 01/01/22  2315 01/01/22  2229   ALBUMIN g/dL 3.69  --  4.26   BILIRUBIN mg/dL 0.3  --  0.5   ALK PHOS U/L 67  --  89   AST (SGOT) U/L 49*  --  37   ALT (SGPT) U/L 23  --  22   AMMONIA umol/L  --  17  --          Cardiac:  Results from last 7 days   Lab Units 01/02/22  0135   PROBNP pg/mL 413.0     Liver and pancreatic function:  Results from last 7 days   Lab Units 01/03/22  0538 01/01/22  2315 01/01/22  2229   ALBUMIN g/dL 3.69  --  4.26   BILIRUBIN mg/dL 0.3  --  0.5   ALK PHOS U/L 67  --  89   AST (SGOT) U/L 49*  --  37   ALT (SGPT) U/L 23  --  22   AMMONIA umol/L  --  17  --        Medications :     aspirin EC, 325 mg, Oral, Daily  aztreonam, 2 g, Intravenous, Q8H  heparin (porcine), 5,000 Units, Subcutaneous, Q12H  insulin aspart, 0-7 Units,  Subcutaneous, TID AC  levothyroxine, 75 mcg, Oral, Q AM  metroNIDAZOLE, 500 mg, Intravenous, Q8H  sodium chloride, 10 mL, Intravenous, Q12H      Pharmacy Consult,           Assessment/Plan     1.  Presumed chronic hyponatremia  2.  Metabolic encephalopathy  3.  Lung mass  4.  Essential Hypertension  5. 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 Type 2 diabetes    Sodium on admission was 114 at 11pm on 1/1/21. Received D5W and DDAVP due to initial relative rapid correction, no stable around 122 that is very appropriate.   Etiology is mostly likely polydipsia and HCTZ, continue on fluid restriction  Target for next 24 hours will will 128-130, I have set parameters for fluid replacement.   If sodium is more than 130 start on D5W at 75ml/h and stop if sodium 130 or less  If sodium is less than 120, 3% saline 100ml over 2 hours  If sodium is between 120-130, no IVF just fluid restriction    Continue checking sodium q4 hours, check serum osm as well.       Maryann Armas MD  01/03/22  11:19 EST

## 2022-01-03 NOTE — PLAN OF CARE
Goal Outcome Evaluation:  Plan of Care Reviewed With: patient, daughter        Progress: no change  Outcome Summary: Pt currently sitting up in chair eating supper; daughter at bedside. Denies any complaints at this time. V/S stable with no signs of respiratory distress present. Pt's sodium dropped to 117; Dr. Armas aware. Protocol placed for Q4hr sodium draws. Pt A&Ox4 today with minimal confusion intermittently. Will continue to monitor pt and follow plan of care.

## 2022-01-03 NOTE — ED NOTES
Per Dr Rooney, 1000 ml daily fluid restriction, redraw sodium level at 2200 and call Dr Rooney with results at [phone]     Luther Tran, RN  01/02/22 1949

## 2022-01-03 NOTE — THERAPY EVALUATION
Acute Care - Speech Language Pathology   Swallow Initial Evaluation [facility]   CLINICAL DYSPHAGIA ASSESSMENT     Patient Name: Feroz Canales  : 1935  MRN: [id number]  Today's Date: 1/3/2022             Admit Date: 2022    Feroz Canales  is seen at bedside this am on 3S to assess safety/efficacy of swallowing fnx, determine safest/least restrictive diet tolerance. His sitter is present at bedside.     Mr. Canales presented to [facility] ED 21 2/2 AMS, abdominal pain with vomiting, unsteady gait just s/p fall. He presented with hyponatremia, admitted for further evaluation and treatment. PMH is significant for CKD, CHF, DM, BPH, HTN.     He was referred to r/o dysphagia per AMS.    Social History     Socioeconomic History   • Marital status:    Tobacco Use   • Smoking status: Never Smoker   Substance and Sexual Activity   • Alcohol use: No   • Drug use: No      Imaging:   CT CHEST, NONCONTRAST, 2022     HISTORY:  86-year-old male in the ED with acute mental status change, nausea and vomiting.     TECHNIQUE:  CT imaging of the chest ordered without IV contrast. Radiation dose reduction techniques included automated exposure control. Radiation audit for CT and nuclear cardiology exams in the last 12 months: 0.     FINDINGS:  Mild patchy infiltrates in both lung bases along with basilar atelectasis. The nondependent portions of both lungs are clear.     Prior transcatheter aortic valve replacement. CABG. Mild cardiomegaly. No pericardial effusion. Normal caliber thoracic aorta.     There is a suspicious irregular mass in the posterior left costophrenic angle measuring 2.2 x 2.0 cm. Malignancy is not excluded. Comparison with any available prior outside studies is recommended.     Small hiatal hernia with mild thoracic esophageal dilatation.     IMPRESSION:  1.  Mild infiltrate and/or atelectasis in the dependent posterior lung bases.  2.  Mild cardiomegaly. CABG. TAVR.  3.  Small suspicious irregular mass  in the posterior left costophrenic angle. This measures up to 2.2 cm. Recommend short interval follow-up chest CT in 2-3 months. This persists, a follow-up PET/CT examination is recommended.     Signer Name: Jimmy Dallas MD   Signed: 1/1/2022 10:59 PM   Workstation Name: [email or web address]    [facility]    Labs:  01/03/22 0647  Basic Metabolic Panel   Collected: 01/03/22 0538  Final result  Specimen: Blood    Glucose 116 High  mg/dL CO2 19.6 Low  mmol/L   BUN 18 mg/dL Calcium 9.0 mg/dL   Creatinine 1.46 High  mg/dL eGFR Non African Amer 46 Low  mL/min/1.73   Sodium 121 Low  mmol/L BUN/Creatinine Ratio 12.3   Potassium 4.1 mmol/L Anion Gap 9.4 mmol/L   Chloride 92 Low  mmol/L            01/03/22 0618  Cortisol - AM   Collected: 01/03/22 0538  Final result  Specimen: Blood    Cortisol - AM 16.64 mcg/dL            01/03/22 0604  Comprehensive Metabolic Panel   Collected: 01/03/22 0538  Final result  Specimen: Blood    Glucose 116 High  mg/dL ALT (SGPT) 23 U/L   BUN 18 mg/dL AST (SGOT) 49 High  U/L   Creatinine 1.46 High  mg/dL Alkaline Phosphatase 67 U/L   Sodium 121 Low  mmol/L Total Bilirubin 0.3 mg/dL   Potassium 4.1 mmol/L eGFR Non African Amer 46 Low  mL/min/1.73   Chloride 92 Low  mmol/L Globulin 2.2 gm/dL   CO2 19.6 Low  mmol/L A/G Ratio 1.7 g/dL   Calcium 9.0 mg/dL BUN/Creatinine Ratio 12.3   Total Protein 5.9 Low  g/dL Anion Gap 9.4 mmol/L   Albumin 3.69 g/dL            01/03/22 0544  CBC & Differential   Collected: 01/03/22 0538  Final result  Specimen: Blood           01/03/22 0544  CBC Auto Differential   Collected: 01/03/22 0538  Final result  Specimen: Blood    WBC 12.55 High  10*3/mm3 Lymphocyte % 7.7 Low  %   RBC 3.03 Low  10*6/mm3 Monocyte % 12.0 %   Hemoglobin 9.6 Low  g/dL Eosinophil % 2.4 %   Hematocrit 29.2 Low  % Basophil % 0.2 %   MCV 96.4 fL Immature Grans % 0.5 %   MCH 31.7 pg Neutrophils, Absolute 9.68 High  10*3/mm3   MCHC 32.9 g/dL Lymphocytes, Absolute  0.97 10*3/mm3   RDW 13.4 % Monocytes, Absolute 1.51 High  10*3/mm3   RDW-SD 47.8 fl Eosinophils, Absolute 0.30 10*3/mm3   MPV 9.9 fL Basophils, Absolute 0.03 10*3/mm3   Platelets 164 10*3/mm3 Immature Grans, Absolute 0.06 High  10*3/mm3   Neutrophil % 77.2 High  % nRBC 0.0 /100 WBC          01/03/22 0002  Sodium   Collected: 01/02/22 2334  Final result  Specimen: Blood    Sodium 122 Low  mmol/L        Diet Orders (active) (From admission, onward)     Start     Ordered    01/02/22 1948  Diet Regular; Daily Fluid Restriction, Consistent Carbohydrate, Renal; 1000 mL Fluid Per Day  Diet Effective Now         01/02/22 1947              He is observed on ra w/o complications.     Mr. Canales is positioned upright and centered in bed to accept multiple po presentations of ice chips, solid cracker, puree, and thin liquids via spoon, cup and straw.  He is able to self feed.     Facial/oral structures are symmetrical upon observation. Lingual protrusion reveals no deviation. Oral mucosa are moist, pink, and clean. Secretions are clear, thin, and well controlled. OROM/SO is wfl to imitate oral postures. Gag is not assessed. Volitional cough is intact w/ adequate  intensity, clear in quality, non-productive. Voice is adequate in intensity, clear in quality w/ intelligible speech. He is a/a and pleasant, oriented to himself, follows simple directives, participates in simple conversational exchanges. He is evidenced to have self provided 90% of his breakfast tray, denying any dysphagia or odynophagia.     Upon po presentations, adequate bolus anticipation and acceptance w/ good labial seal for bolus clearance via spoon bowl, cup rim stability and suction via straw. Bolus formation, manipulation and control are wfl w/ rotary mastication pattern. A-p transit is timely w/o oral residue. No overt s/s aspiration before the swallow.     Pharyngeal swallow is timely w/ adequate hyolaryngeal elevation per palpation. No overt s/s  aspiration evidenced across this evaluation. No silent aspiration suspected. He denies odynophagia.    Visit Dx:     ICD-10-CM ICD-9-CM   1. Hyponatremia  E87.1 276.1     Patient Active Problem List   Diagnosis   • CKD (chronic kidney disease) stage 3, GFR 30-59 ml/min ([facility])   • Detrusor instability   • Hyponatremia     Past Medical History:   Diagnosis Date   • BPH with urinary obstruction    • CHF (congestive heart failure) ([facility])    • Chronic kidney disease    • Diabetes mellitus ([facility])    • Hypertension      Past Surgical History:   Procedure Laterality Date   • CARDIAC SURGERY     • CARDIAC VALVE REPLACEMENT     EDUCATION  The patient has been educated in the following areas:   Dysphagia (Swallowing Impairment) Oral Care/Hydration.     Impression: Mr. Canales presents w/ wfl oropharyngeal swallow w/o s/s aspiration.No s/s indicative of silent aspiration.  No odynophagia reported.      He is felt to most benefit from continued least restrictive regular consistency po diet, thin liquids. Medications whole, single pill presentations only, please. Upright and centered for all po intake. Oral are protocol.     SLP Recommendation and Plan    1. Continue least restrictive regular consistency po diet, thin liquids.    2. Medications whole in puree/thins. Single pill presentations only, please.   3. Upright and centered for all po intake.  4. GRAHAM precautions.  5. Oral care protocol.  No further formal SLP f/u warranted at this time.    D/w Mr. Canales and present staff sitter results and recommendations w/ verbal agreement.    Thank you for allowing me to participate in the care of your patient-  Mary Jane Sullivan M.S., CCC/SLP                                                                                           Time Calculation:                Mary Jane Sullivan, MS CCC-SLP  1/3/2022

## 2022-01-03 NOTE — PLAN OF CARE
Goal Outcome Evaluation:  Plan of Care Reviewed With: patient        Progress: no change  Outcome Summary: Pt awake and alert att. Passed beside swallow eval. Following commands. Yaunker at bedside and pt using on own. No s/s of distress noted. Continue with plan of care.

## 2022-01-03 NOTE — NURSING NOTE
Called [employer] and informed them that pt's daughter brought CPAP machine in. Ticket confirmation is [id number].

## 2022-01-03 NOTE — CASE MANAGEMENT/SOCIAL WORK
Discharge Planning Assessment  [facility]     Patient Name: Feroz Canales  MRN: [id number]  Today's Date: 1/3/2022    Admit Date: 1/1/2022     Discharge Needs Assessment     Row Name 01/03/22 1554       Living Environment    Lives With alone    Unique Family Situation Lives at home alone with family living below him.    Current Living Arrangements home/apartment/condo    Primary Care Provided by child(eli)    Provides Primary Care For no one    Family Caregiver if Needed child(eli), adult    Quality of Family Relationships helpful; involved; supportive    Able to Return to Prior Arrangements yes       Transition Planning    Patient/Family Anticipates Transition to home    Transportation Anticipated family or friend will provide       Discharge Needs Assessment    Equipment Currently Used at Home rollator; cpap; commode               Discharge Plan     Row Name 01/03/22 1559       Plan    Plan Pt admitted on 1/1/22.  SS received consult per nsg for discharge planning.  SS spoke with pt and daughter Naima on this date at bedside.  Pt lives at home alone with family residing below him in same building.  Pt currently does not utilize home health services.  Pt family request home health at discharge.  Pt resides at [address] [place] [address] in [facility].  Pt's family stated no preference of home health agency if ordered.  Pt currently utilizes hospital bed, cpap, shower chair, lift chair, bedside commode and rollator via [facility].  Pt's PCP is Dr. Senior in [facility].  SS will follow.    Row Name 01/03/22 4517       Plan    Plan Comments Admitted with Ac.Met. Enceph./ Ac Hypona+, CKD, Poss. Bi-basilar infiltrate &/or atelectasis per CT. Aspiration precs., SLP eval,  CTchest-Sm. Suspicious irreg. Mass in post. L costophrenic angle. Mon. Labs/lytes, 1L /day fl.restriction. Nephr.cst'd w/recs. T.99.4, sats 94%, on 2lnc,  700ml uop, Azactam IV, accuk/ssic, flagyl IV, heparin sq, Na+ 117 (121), bun 18/cr.  1.46, wbc 12.5, CRP 0.66, BC(P)              Continued Care and Services - Admitted Since 1/1/2022    Coordination has not been started for this encounter.          Demographic Summary     Row Name 01/03/22 1288       General Information    Admission Type inpatient    Referral Source nursing    Reason for Consult discharge planning    Preferred Language English                  Kalpana Dorado, KATIEW

## 2022-01-03 NOTE — PROGRESS NOTES
"86M Obese by BMI PMH CHF (details unknown), prior TAVR, diabetes mellitus, CKD, BPH, and hypertension who presents to the ED complaining of altered mental status and abdominal pain, found to have severe hyponatremia, witness aspiration with new pneumonia.  I have evaluated the patient independently, I have reviewed H&P, all labs and images from today and evaluated the patient at bedside.  Na up to 121 today, repeat at 117, Nephrology following, previously on HCTZ and bumex and held, daughter witnessed aspiration, on broad antibiotics with Azactam and Flagyl, repeat CXR today to evaluate for worsening CHF or pneumonia progression, has had increased coughing and 02 dropped with ambulation with PT, has vague irregular mass on CT chest, requested CT imaging from [facility] as daughter reports that was last imaging available and she seemed fairly familiar with having heard he had a spot in this location before, CT head showing concern for normal pressure hydrocephalus (NPH) and ER reports patient's daughter has deferred lumbar puncture on admission, I discussed these findings with her and she reported she thought this was related to his low sodium, I discussed that this picture isn't typically seen with hyponatremia and is concerning for NPH, she did endorse he walks with walker at home and has urinary \"dribbling\" which may both be consistent with diagnosis of NPH, she still deferred lumbar puncture at this time due to age and reported return \"back to his baseline\", have requested CT Head from [facility] as he reportedly had prior imaging there as well, have ordered MRI brain to further evaluate as well. Will follow tomorrow with formal note and updated plan.     "

## 2022-01-03 NOTE — THERAPY EVALUATION
Acute Care - Physical Therapy Initial Evaluation   [facility]     Patient Name: Feroz Canales  : 1935  MRN: [id number]  Today's Date: 1/3/2022   Onset of Illness/Injury or Date of Surgery: 22  Visit Dx:     ICD-10-CM ICD-9-CM   1. Hyponatremia  E87.1 276.1     Patient Active Problem List   Diagnosis   • CKD (chronic kidney disease) stage 3, GFR 30-59 ml/min ([facility])   • Detrusor instability   • Hyponatremia     Past Medical History:   Diagnosis Date   • BPH with urinary obstruction    • CHF (congestive heart failure) ([facility])    • Chronic kidney disease    • Diabetes mellitus ([facility])    • Hypertension      Past Surgical History:   Procedure Laterality Date   • CARDIAC SURGERY     • CARDIAC VALVE REPLACEMENT       PT Assessment (last 12 hours)     PT Evaluation and Treatment     Row Name 22 1400          Physical Therapy Time and Intention    Subjective Information no complaints  -     Document Type evaluation  -     Mode of Treatment individual therapy; physical therapy  -     Patient Effort good  -     Symptoms Noted During/After Treatment fatigue; other (see comments)  Pt did not report SOA but RN says his O2 dropped to 78  -     Comment Pt was sitting EOB with family present upon therapist arrival.  He is [facility] and family assisted PT with communication.  Pt was willing to participate in PT.  Post ambulation his O2 dropped to 78 and returned to 90 when resting supine.  RN was in room.  -     Row Name 22 1400          General Information    Patient Profile Reviewed yes  -     Onset of Illness/Injury or Date of Surgery 22  -     Referring Physician Malathi  -     Patient Observations alert; cooperative; agree to therapy  -     Patient/Family/Caregiver Comments/Observations Daughter was present throughout treatment.  At end of session his other daughter arrived, she is the main caregiver.  She stated she is able to assist pt at home with transfers and ambulation.  She is a nurse at  [employer].  She is comfortable with pt returning home with 24/7 assist and home health therapy.  She also stated she feels as if pt aspirated while he was in the ER.  She says he has been SOA and has a non-productive cough.  This was communicated with the physician.  -     General Observations of Patient Pt was [place] but pleasant and cooperative with therapy.  -     Prior Level of Function independent:; all household mobility; gait; transfer; bed mobility  Supervision provided by family/caregivers  -     Equipment Currently Used at Home rollator  Own a FWW and w/c but was not using them  -     Existing Precautions/Restrictions fall; other (see comments)  Pt was not on O2 upon therapist arrival and ambulated without O2.  RN placed pt on O2 post ambulation due to drop in sats.  -     Limitations/Impairments safety/cognitive; hearing  -     Risks Reviewed patient and family:; LOB; dizziness; increased discomfort  -     Benefits Reviewed patient and family:; improve function; increase independence; increase strength; increase balance; increase knowledge  -     Barriers to Rehab hearing deficit  -     Row Name 01/03/22 1400          Previous Level of Function/Home Environm    Household Ambulation, Premorbid Functional Level needs assistive device for safe performance; other (see comments)  supervision  -     Row Name 01/03/22 1400          Living Environment    Current Living Arrangements home/apartment/condo  -     Lives With child(eli), adult  -     Row Name 01/03/22 1400          Cognition    Orientation Status (Cognition) oriented to; place; situation  -     Follows Commands (Cognition) follows one-step commands; 75-90% accuracy; delayed response/completion; increased processing time needed; other (see comments)  [place] limited command following  -     Personal Safety Interventions gait belt; muscle strengthening facilitated; nonskid shoes/slippers when out of bed; supervised activity  -     Row  Name 01/03/22 1400          Pain Scale: Word Pre/Post-Treatment    Pain: Word Scale, Pretreatment 0 - no pain  -     Posttreatment Pain Rating 0 - no pain  -     Row Name 01/03/22 1400          Bed Mobility    Bed Mobility supine-sit; sit-supine  -     Sit-Supine [place] (Bed Mobility) minimum assist (75% patient effort); 2 person assist; verbal cues  -     Bed Mobility, Safety Issues decreased use of legs for bridging/pushing  -     Assistive Device (Bed Mobility) bed rails; head of bed elevated; draw sheet  -     Row Name 01/03/22 1400          Transfers    Transfers sit-stand transfer; stand-sit transfer  -     Sit-Stand [place] (Transfers) minimum assist (75% patient effort); 1 person assist; verbal cues  -     Stand-Sit [place] (Transfers) minimum assist (75% patient effort); 1 person assist; verbal cues  -     Row Name 01/03/22 1400          Sit-Stand Transfer    Assistive Device (Sit-Stand Transfers) walker, front-wheeled  -     Row Name 01/03/22 1400          Stand-Sit Transfer    Assistive Device (Stand-Sit Transfers) walker, front-wheeled  -     Row Name 01/03/22 1400          Gait/Stairs (Locomotion)    Wall[place] Level (Gait) minimum assist (75% patient effort); contact guard; 1 person assist; verbal cues  -     Assistive Device (Gait) walker, front-wheeled  -     Distance in Feet (Gait) 80ft  -     Comment (Gait/Stairs) Multiple standing rest breaks and directional changes with FWW.  Pt thought he had to use restroom but he has a stephen catheter in.  Pt did not appear in respiratory distress and reported he did not feel SOA but O2 dropped to 78 post ambulation.  RN came in, administered O2 and was monitoring pt.  He returned to 90 when supine in bed.  -     Row Name 01/03/22 1400          Safety Issues, Functional Mobility    Safety Issues Affecting Function (Mobility) insight into deficits/self-awareness; judgment; positioning of assistive device;  problem-solving; safety precaution awareness; sequencing abilities  -     Impairments Affecting Function (Mobility) balance; endurance/activity tolerance; strength  -KP     Row Name             Wound 01/03/22 0010 Right proximal elbow Skin Tear    Wound - Properties Group Placement Date: 01/03/22  -EC Placement Time: 0010  -EC Side: Right  -EC Orientation: proximal  -EC Location: elbow  -EC Primary Wound Type: Skin tear  -EC     Retired Wound - Properties Group Date first assessed: 01/03/22  -EC Time first assessed: 0010  -EC Side: Right  -EC Location: elbow  -EC Primary Wound Type: Skin tear  -EC     Row Name 01/03/22 1400          Plan of Care Review    Plan of Care Reviewed With patient; daughter  -     Outcome Summary PT eval complete.  Pt required Nelly and FWW for mobility.  Ambulated 80ft.  Daughter reports pt has 24/7 care and they are able to assist him at home.  Agreeable for pt to return home with home health therapy.  -     Row Name 01/03/22 1400          Physical Therapy Goals    Bed Mobility Goal Selection (PT) bed mobility, PT goal 1  -     Transfer Goal Selection (PT) transfer, PT goal 1  -     Gait Training Goal Selection (PT) gait training, PT goal 1  -     Row Name 01/03/22 1400          Bed Mobility Goal 1 (PT)    Activity/Assistive Device (Bed Mobility Goal 1, PT) bed mobility activities, all  -KP     [place] Level/Cues Needed (Bed Mobility Goal 1, PT) supervision required  -     Time Frame (Bed Mobility Goal 1, PT) long term goal (LTG); by discharge  -     Row Name 01/03/22 1400          Transfer Goal 1 (PT)    Activity/Assistive Device (Transfer Goal 1, PT) transfers, all  -KP     [place] Level/Cues Needed (Transfer Goal 1, PT) supervision required  -     Time Frame (Transfer Goal 1, PT) long term goal (LTG); by discharge  -     Row Name 01/03/22 1400          Gait Training Goal 1 (PT)    Activity/Assistive Device (Gait Training Goal 1, PT) gait (walking  locomotion); assistive device use; walker, rolling  -     [place] Level (Gait Training Goal 1, PT) standby assist; 1 person assist  -     Distance (Gait Training Goal 1, PT) 100ft  -     Time Frame (Gait Training Goal 1, PT) long term goal (LTG); by discharge  -     Row Name 01/03/22 1400          Positioning and Restraints    Pre-Treatment Position in bed  Sitting EOB  -     Post Treatment Position bed  -KP     In Bed notified nsg; supine; call light within reach; encouraged to call for assist; with nsg; side rails up x3; with family/caregiver  Lines in tact and needs in reach  -     Row Name 01/03/22 1400          Therapy Assessment/Plan (PT)    Patient/Family Therapy Goals Statement (PT) To return home  -     Functional Level at Time of Evaluation (PT) Nelly  -     PT Diagnosis (PT) Decreased standing balance, strength and functional activity tolerance  -     Rehab Potential (PT) good, to achieve stated therapy goals  -     Criteria for Skilled Interventions Met (PT) yes; meets criteria; skilled treatment is necessary  -     Predicted Duration of Therapy Intervention (PT) 2 wks  -     Problem List (PT) balance; coordination; mobility; strength; postural control  -     Activity Limitations Related to Problem List (PT) unable to transfer safely; unable to ambulate safely  -     Row Name 01/03/22 1400          PT Evaluation Complexity    History, PT Evaluation Complexity 3 or more personal factors and/or comorbidities  -     Examination of Body Systems (PT Eval Complexity) total of 3 or more elements  -     Clinical Presentation (PT Evaluation Complexity) stable  -     Clinical Decision Making (PT Evaluation Complexity) low complexity  -KP     Overall Complexity (PT Evaluation Complexity) low complexity  -           User Key  (r) = Recorded By, (t) = Taken By, (c) = Cosigned By    Initials Name Provider Type    Anuradha Bassett, RN Registered Nurse    Monie Larson  PT Physical Therapist                Physical Therapy Education                 Title: PT OT SLP Therapies (Done)     Topic: Physical Therapy (Done)     Point: Mobility training (Done)     Learning Progress Summary           Patient Acceptance, E, VU by  at 1/3/2022 1514    Comment: Pt and his daughter/caregiver were agreeable to PT POC and d/c recommendations   Family Acceptance, E, VU by  at 1/3/2022 1514    Comment: Pt and his daughter/caregiver were agreeable to PT POC and d/c recommendations                   Point: Home exercise program (Done)     Learning Progress Summary           Patient Acceptance, E, VU by  at 1/3/2022 1514    Comment: Pt and his daughter/caregiver were agreeable to PT POC and d/c recommendations   Family Acceptance, E, VU by  at 1/3/2022 1514    Comment: Pt and his daughter/caregiver were agreeable to PT POC and d/c recommendations                   Point: Body mechanics (Done)     Learning Progress Summary           Patient Acceptance, E, VU by  at 1/3/2022 1514    Comment: Pt and his daughter/caregiver were agreeable to PT POC and d/c recommendations   Family Acceptance, E, VU by  at 1/3/2022 1514    Comment: Pt and his daughter/caregiver were agreeable to PT POC and d/c recommendations                   Point: Precautions (Done)     Learning Progress Summary           Patient Acceptance, E, VU by  at 1/3/2022 1514    Comment: Pt and his daughter/caregiver were agreeable to PT POC and d/c recommendations   Family Acceptance, E, VU by  at 1/3/2022 1514    Comment: Pt and his daughter/caregiver were agreeable to PT POC and d/c recommendations                               User Key     Initials Effective Dates Name Provider Type Discipline     06/16/21 -  Monie Alvarenga PT Physical Therapist PT              PT Recommendation and Plan  Anticipated Discharge Disposition (PT): home with 24/7 care, home with home health  Planned Therapy Interventions (PT): balance training,  bed mobility training, gait training, home exercise program, neuromuscular re-education, patient/family education, postural re-education, ROM (range of motion), stair training, strengthening, stretching, transfer training  Therapy Frequency (PT): other (see comments) (2-3x/wk)  Plan of Care Reviewed With: patient, daughter  Outcome Summary: PT eval complete.  Pt required Nelly and FWW for mobility.  Ambulated 80ft.  Daughter reports pt has 24/7 care and they are able to assist him at home.  Agreeable for pt to return home with home health therapy.       Time Calculation:    PT Charges     Row Name 01/03/22 1515             Time Calculation    PT Received On 01/03/22  -      PT Goal Re-Cert Due Date 01/17/22  -            User Key  (r) = Recorded By, (t) = Taken By, (c) = Cosigned By    Initials Name Provider Type    Monie Larson PT Physical Therapist              Therapy Charges for Today     Code Description Service Date Service Provider Modifiers Qty    02560136929 HC PT EVAL LOW COMPLEXITY 4 1/3/2022 Monie Alvarenga, ALLY GP 1               Monie Alvarenga PT  1/3/2022

## 2022-01-03 NOTE — ED NOTES
Per patients daughter request, I called [facility]. Spoke with Gilson, he advised they are not taking transfers from outside facilities at this time.      Symes, Heather  01/02/22 2202       Symes, Heather  01/02/22 221

## 2022-01-03 NOTE — H&P
[facility]   HISTORY AND PHYSICAL    Patient Name: Feroz Canales  : 1935  MRN: [id number]  Primary Care Physician:  Provider, No Known  Date of admission: 2022    Subjective   Subjective     Chief Complaint: acute mental status change    History of Present Illness The patient is an 87 yo male with PMHx significant for CHF (details unknown), diabetes mellitus, CKD, BPH, history of cardiac valve replacement and hypertension who presents to the ED complaining of altered mental status and abdominal pain. There was also report per ED documentation that the patient had vomiting and was unsteady when ambulating and suffered a fall.     Patient found to have severe hyponatremia in the ED (114). Patient also had urinary retention and a stephen catheter was placed while in the emergency department. Nephrology was consulted and evaluated the patient while in the ED.     Patient seen and examined in  308-2.     Present during visit: SHAYLA Ling    Review of Systems   Unable to perform ROS: Other      Personal History     Past Medical History:   Diagnosis Date   • BPH with urinary obstruction    • CHF (congestive heart failure) (HCC)    • Chronic kidney disease    • Diabetes mellitus (HCC)    • Hypertension        Past Surgical History:   Procedure Laterality Date   • CARDIAC SURGERY     • CARDIAC VALVE REPLACEMENT         Family History: family history includes No Known Problems in his father and mother. Otherwise pertinent FHx was reviewed and not pertinent to current issue.    Social History:  reports that he has never smoked. He does not have any smokeless tobacco history on file. He reports that he does not drink alcohol and does not use drugs.    Home Medications:  HYDROcodone-acetaminophen, amLODIPine, aspirin EC, bumetanide, carvedilol, cetirizine, colchicine, ferrous sulfate, fluticasone, gabapentin, hydrALAZINE, hydroCHLOROthiazide, insulin glargine, levothyroxine, losartan, multivitamin, multivitamin with  minerals, polyethylene glycol, rosuvastatin, spironolactone, and terazosin    Allergies:  Allergies   Allergen Reactions   • Keflex [Cephalexin] Anaphylaxis and Hives   • Sulfa Antibiotics Hives and GI Intolerance       Objective    Objective     Vitals:   Temp:  [99.1 °F (37.3 °C)-99.4 °F (37.4 °C)] 99.4 °F (37.4 °C)  Heart Rate:  [55-82] 78  Resp:  [18-20] 20  BP: (113-159)/(52-98) 155/52  Flow (L/min):  [2] 2    Physical Exam  Constitutional:       General: He is sleeping. He is not in acute distress.     Appearance: He is well-developed and overweight.   HENT:      Head: Atraumatic.   Eyes:      Conjunctiva/sclera: Conjunctivae normal.      Comments: Bruise noted around right eye, present on admission   Neck:      Trachea: No tracheal deviation.   Cardiovascular:      Rate and Rhythm: Normal rate and regular rhythm.      Heart sounds: No murmur heard.  No friction rub. No gallop.       Comments: No significant lower extremity edema  Pulmonary:      Effort: No respiratory distress.      Breath sounds: Normal breath sounds. No wheezing or rales.   Abdominal:      General: Bowel sounds are decreased. There is no distension.      Palpations: Abdomen is soft.      Tenderness: There is no abdominal tenderness. There is no guarding.   Genitourinary:     Comments: Llamas catheter in place with dark yellow urine noted in the collection bag  Skin:     General: Skin is warm and dry.      Findings: No erythema or rash.   Neurological:      Comments: Sleeping but arousable; no acute focal deficits appreciated but patient falls quickly back to sleep and is very hard of hearing         Result Review    Result Review:  I have personally reviewed the results from the time of this admission to 1/3/2022 02:34 EST and agree with these findings:  [x]  Laboratory  []  Microbiology  []  Radiology  []  EKG/Telemetry   []  Cardiology/Vascular   []  Pathology  []  Old records  []  Other:  Most notable findings include: Initial sodium  114-->122; initial creatinine 1.59. WBC 13.06, 78.5% neutrophils.    EKG with sinus rhythm with arrhythmia and LBBB (no comparison EKGs)    CT abdomen/pelvis without contrast:  ABDOMEN FINDINGS:  Significant technical limitations related patient motion artifact and restricted positioning.  Tiny stone material within nondistended gallbladder. No bile duct dilatation. Liver, pancreas and spleen are within normal limits. The kidneys are unobstructed. Normal caliber abdominal aorta. Normal adrenal glands. Small bowel and colon are normal in caliber and appearance, as imaged. Normal appendix. No gastric distention. Small hiatal hernia.     PELVIS FINDINGS:  Moderately distended urinary bladder with only mild prostate enlargement. Rectum is negative. Small indirect fat-containing left inguinal hernia. No free pelvic fluid.     IMPRESSION:  1.  No acute abnormality within the abdomen or pelvis. Technical limitations as noted.  2.  Cholelithiasis.  3.  Hiatal hernia.  4.  Distended urinary bladder. No evidence of upper urinary tract obstruction.    CT head without contrast:  FINDINGS:   No intracranial hemorrhage, mass, or infarct. No hydrocephalus or extra-axial fluid collection. There is prominence of the lateral ventricles out of portion to the sulci. There are no masses or extra-axial fluid collections or hemorrhage. Third ventricle  is also prominent. The skull base, calvarium, and extracranial soft tissues are normal.     IMPRESSION:  No acute findings. There is prominence of the lateral and third ventricles suggesting normal pressure hydrocephalus. Fourth ventricle is normal..    CT chest  without contrast:  FINDINGS:  Mild patchy infiltrates in both lung bases along with basilar atelectasis. The nondependent portions of both lungs are clear.     Prior transcatheter aortic valve replacement. CABG. Mild cardiomegaly. No pericardial effusion. Normal caliber thoracic aorta.     There is a suspicious irregular mass in  the posterior left costophrenic angle measuring 2.2 x 2.0 cm. Malignancy is not excluded. Comparison with any available prior outside studies is recommended.     Small hiatal hernia with mild thoracic esophageal dilatation.     IMPRESSION:  1.  Mild infiltrate and/or atelectasis in the dependent posterior lung bases.  2.  Mild cardiomegaly. CABG. TAVR.  3.  Small suspicious irregular mass in the posterior left costophrenic angle. This measures up to 2.2 cm. Recommend short interval follow-up chest CT in 2-3 months. This persists, a follow-up PET/CT examination is recommended.    Assessment/Plan   Assessment / Plan     Brief Patient Summary:  Feroz Canales is a 86 y.o. male who presents from home with acute encephalopathy/altered mental status, hyponatremia and urinary retention    Assessment/Plan:  -Acute metabolic encephalopathy  -Acute hyponatremia, unclear if acute versus chronic  -CT evidence of normal pressure hydrocephalus with prominence of the lateral and third ventricules  -CKD Stage IIIb   Admit to telemetry with fall precautions and every 4 hours neuro checks. Every 4 hours BMPs have been requested. Continue with the 1 L/day fluid restriction as per nephrology recommendations. Continue to hold HCTZ and Bumex. Also will hold losartan and spironolactone for now.     I have placed a formal nephrology consultation for further evaluation and recommendations.  I discussed the case with Dr. Nagy in the ED and he stated that the patient's daughter did not wish for him to have a lumbar puncture and reported that her father's mentation had improved with improvement of his sodium level. Will consult PT.     -Possible bi-basilar infiltrate and/or atelectasis per CT   -Leukocytosis  -Small suspicious irregular mass in the posterior left costophrenic angle measuring up to 2.2 cm; finding known to patient daughter per Dr. Carson/ED physician  Aspiration precautions. SLP consult. Will place on Azactam and Flagyl  for now; patient with PCN allergy. Monitor temperature curve. Repeat CBC in a.m. Recommend outpatient follow up imaging regarding irregular mass in the left base/costophrenic angle.    -Left bundle branch block; no previous EKGs in our system to compare  -History of hypertension  -History of CHF, no previous echo in our system, pro-BNP normal  -History of cardiac valve replacement/surgery; details unknown  Will repeat EKG in a.m. Consider cardiology consult. No known reports of chest pain. Continue to monitor on telemetry. Continue home ASA. Hold carvedilol for now.     -Diabetes mellitus, type II, currently controlled  -Neuropathy  Will place on consistent carbohydrate diet, accu-checks and the hypoglycemia protocol. Can add SSI +/- basal insulin if needed. Will hold on gabapentin for now. Reassess based on mental status.     -Chronic opiate use  Hold home Norco for now; resume as appropriate.     -Normocytic anemia, appears chronic  Repeat CBC in a.m. No reports of evidence of active bleeding. History of iron deficiency.     -Hypothyroidism  Continue levothyroxine. TSH within normal limits.     -Advanced age  -Hard of hearing    DVT prophylaxis:  Medical DVT prophylaxis orders are present./SCDs    CODE STATUS:    Code Status (Patient has no pulse and is not breathing): CPR (Attempt to Resuscitate)  Medical Interventions (Patient has pulse or is breathing): Full Support    Admission Status:  I believe this patient meets inpatient status.    Isabel Servin, DO

## 2022-01-03 NOTE — PROGRESS NOTES
"Nurse reports patient now awake and oriented to self. Patient seen briefly at bedside. He is alert to self and place (\"I'm down here at the hospital\"). He still remains a poor historian and states that to his knowledge he has not been unwell. Patient correctly using Yankauer suction. He is able to follow commands at this time; passed nursing bedside swallow evaluation and is without acute focal deficit.   "

## 2022-01-04 ENCOUNTER — APPOINTMENT (OUTPATIENT)
Dept: MRI IMAGING | Facility: HOSPITAL | Age: 87
End: 2022-01-04

## 2022-01-04 LAB
ANION GAP SERPL CALCULATED.3IONS-SCNC: 12 MMOL/L (ref 5–15)
BASOPHILS # BLD AUTO: 0.03 10*3/MM3 (ref 0–0.2)
BASOPHILS NFR BLD AUTO: 0.2 % (ref 0–1.5)
BUN SERPL-MCNC: 21 MG/DL (ref 8–23)
BUN/CREAT SERPL: 15.2 (ref 7–25)
CALCIUM SPEC-SCNC: 9 MG/DL (ref 8.6–10.5)
CHLORIDE SERPL-SCNC: 95 MMOL/L (ref 98–107)
CO2 SERPL-SCNC: 17 MMOL/L (ref 22–29)
CREAT SERPL-MCNC: 1.38 MG/DL (ref 0.76–1.27)
DEPRECATED RDW RBC AUTO: 47.5 FL (ref 37–54)
EOSINOPHIL # BLD AUTO: 0.03 10*3/MM3 (ref 0–0.4)
EOSINOPHIL NFR BLD AUTO: 0.2 % (ref 0.3–6.2)
ERYTHROCYTE [DISTWIDTH] IN BLOOD BY AUTOMATED COUNT: 13.4 % (ref 12.3–15.4)
FT4I SERPL CALC-MCNC: 1.6 (ref 1.2–4.9)
GFR SERPL CREATININE-BSD FRML MDRD: 49 ML/MIN/1.73
GLUCOSE BLDC GLUCOMTR-MCNC: 125 MG/DL (ref 70–130)
GLUCOSE BLDC GLUCOMTR-MCNC: 129 MG/DL (ref 70–130)
GLUCOSE BLDC GLUCOMTR-MCNC: 133 MG/DL (ref 70–130)
GLUCOSE BLDC GLUCOMTR-MCNC: 142 MG/DL (ref 70–130)
GLUCOSE SERPL-MCNC: 122 MG/DL (ref 65–99)
HBA1C MFR BLD: 5.9 % (ref 4.8–5.6)
HCT VFR BLD AUTO: 29.7 % (ref 37.5–51)
HGB BLD-MCNC: 9.6 G/DL (ref 13–17.7)
IMM GRANULOCYTES # BLD AUTO: 0.05 10*3/MM3 (ref 0–0.05)
IMM GRANULOCYTES NFR BLD AUTO: 0.4 % (ref 0–0.5)
LYMPHOCYTES # BLD AUTO: 1.02 10*3/MM3 (ref 0.7–3.1)
LYMPHOCYTES NFR BLD AUTO: 8.3 % (ref 19.6–45.3)
MCH RBC QN AUTO: 31.4 PG (ref 26.6–33)
MCHC RBC AUTO-ENTMCNC: 32.3 G/DL (ref 31.5–35.7)
MCV RBC AUTO: 97.1 FL (ref 79–97)
MONOCYTES # BLD AUTO: 1.19 10*3/MM3 (ref 0.1–0.9)
MONOCYTES NFR BLD AUTO: 9.7 % (ref 5–12)
NEUTROPHILS NFR BLD AUTO: 81.2 % (ref 42.7–76)
NEUTROPHILS NFR BLD AUTO: 9.94 10*3/MM3 (ref 1.7–7)
NRBC BLD AUTO-RTO: 0 /100 WBC (ref 0–0.2)
OSMOLALITY SERPL: 258 MOSM/KG (ref 280–301)
PLATELET # BLD AUTO: 156 10*3/MM3 (ref 140–450)
PMV BLD AUTO: 10 FL (ref 6–12)
POTASSIUM SERPL-SCNC: 4.3 MMOL/L (ref 3.5–5.2)
QT INTERVAL: 434 MS
QTC INTERVAL: 512 MS
RBC # BLD AUTO: 3.06 10*6/MM3 (ref 4.14–5.8)
SODIUM SERPL-SCNC: 121 MMOL/L (ref 136–145)
SODIUM SERPL-SCNC: 123 MMOL/L (ref 136–145)
SODIUM SERPL-SCNC: 124 MMOL/L (ref 136–145)
T3 SERPL-MCNC: 69 NG/DL (ref 71–180)
T3RU NFR SERPL: 26 % (ref 24–39)
T4 SERPL-MCNC: 6 UG/DL (ref 4.5–12)
TSH SERPL DL<=0.005 MIU/L-ACNC: 1.96 UIU/ML (ref 0.45–4.5)
WBC NRBC COR # BLD: 12.26 10*3/MM3 (ref 3.4–10.8)

## 2022-01-04 PROCEDURE — 94799 UNLISTED PULMONARY SVC/PX: CPT

## 2022-01-04 PROCEDURE — 70551 MRI BRAIN STEM W/O DYE: CPT

## 2022-01-04 PROCEDURE — 84295 ASSAY OF SERUM SODIUM: CPT | Performed by: INTERNAL MEDICINE

## 2022-01-04 PROCEDURE — 25010000002 HEPARIN (PORCINE) PER 1000 UNITS: Performed by: INTERNAL MEDICINE

## 2022-01-04 PROCEDURE — 85025 COMPLETE CBC W/AUTO DIFF WBC: CPT | Performed by: INTERNAL MEDICINE

## 2022-01-04 PROCEDURE — 25010000002 DIPHENHYDRAMINE PER 50 MG: Performed by: PHYSICIAN ASSISTANT

## 2022-01-04 PROCEDURE — 83935 ASSAY OF URINE OSMOLALITY: CPT | Performed by: INTERNAL MEDICINE

## 2022-01-04 PROCEDURE — 99222 1ST HOSP IP/OBS MODERATE 55: CPT | Performed by: INTERNAL MEDICINE

## 2022-01-04 PROCEDURE — 99222 1ST HOSP IP/OBS MODERATE 55: CPT | Performed by: SPECIALIST

## 2022-01-04 PROCEDURE — 80048 BASIC METABOLIC PNL TOTAL CA: CPT | Performed by: INTERNAL MEDICINE

## 2022-01-04 PROCEDURE — 70551 MRI BRAIN STEM W/O DYE: CPT | Performed by: RADIOLOGY

## 2022-01-04 PROCEDURE — 82962 GLUCOSE BLOOD TEST: CPT

## 2022-01-04 PROCEDURE — 83036 HEMOGLOBIN GLYCOSYLATED A1C: CPT | Performed by: INTERNAL MEDICINE

## 2022-01-04 PROCEDURE — 99233 SBSQ HOSP IP/OBS HIGH 50: CPT | Performed by: INTERNAL MEDICINE

## 2022-01-04 RX ORDER — DIPHENHYDRAMINE HYDROCHLORIDE 50 MG/ML
50 INJECTION INTRAMUSCULAR; INTRAVENOUS ONCE
Status: COMPLETED | OUTPATIENT
Start: 2022-01-04 | End: 2022-01-04

## 2022-01-04 RX ADMIN — METRONIDAZOLE 500 MG: 500 INJECTION, SOLUTION INTRAVENOUS at 17:21

## 2022-01-04 RX ADMIN — METRONIDAZOLE 500 MG: 500 INJECTION, SOLUTION INTRAVENOUS at 08:23

## 2022-01-04 RX ADMIN — LEVOTHYROXINE SODIUM 75 MCG: 0.07 TABLET ORAL at 05:57

## 2022-01-04 RX ADMIN — IPRATROPIUM BROMIDE AND ALBUTEROL SULFATE 3 ML: .5; 3 SOLUTION RESPIRATORY (INHALATION) at 06:42

## 2022-01-04 RX ADMIN — SODIUM CHLORIDE, PRESERVATIVE FREE 10 ML: 5 INJECTION INTRAVENOUS at 20:16

## 2022-01-04 RX ADMIN — SODIUM CHLORIDE 2 G: 9 INJECTION, SOLUTION INTRAVENOUS at 03:34

## 2022-01-04 RX ADMIN — HEPARIN SODIUM 5000 UNITS: 5000 INJECTION INTRAVENOUS; SUBCUTANEOUS at 08:24

## 2022-01-04 RX ADMIN — HEPARIN SODIUM 5000 UNITS: 5000 INJECTION INTRAVENOUS; SUBCUTANEOUS at 20:15

## 2022-01-04 RX ADMIN — SODIUM CHLORIDE, PRESERVATIVE FREE 10 ML: 5 INJECTION INTRAVENOUS at 08:24

## 2022-01-04 RX ADMIN — ASPIRIN 325 MG: 325 TABLET, COATED ORAL at 08:24

## 2022-01-04 RX ADMIN — SODIUM CHLORIDE 2 G: 9 INJECTION, SOLUTION INTRAVENOUS at 20:15

## 2022-01-04 RX ADMIN — METRONIDAZOLE 500 MG: 500 INJECTION, SOLUTION INTRAVENOUS at 23:55

## 2022-01-04 RX ADMIN — HYDROCODONE BITARTRATE AND ACETAMINOPHEN 1 TABLET: 5; 325 TABLET ORAL at 20:15

## 2022-01-04 RX ADMIN — DIPHENHYDRAMINE HYDROCHLORIDE 50 MG: 50 INJECTION, SOLUTION INTRAMUSCULAR; INTRAVENOUS at 21:57

## 2022-01-04 RX ADMIN — SODIUM CHLORIDE 2 G: 9 INJECTION, SOLUTION INTRAVENOUS at 12:41

## 2022-01-04 NOTE — CONSULTS
Date of Admit: 1/1/2022  Date of Consult: 01/04/22  No ref. provider found        Hyponatremia      Assessment      1. Coronary artery disease status post CABG x5 2004  2. Status post TAVR 2017  3. History of paroxysmal atrial fibrillation patient off anticoagulation?  Status post occlusive device placement  4. Hyponatremia, hypoosmolar  5. Metabolic encephalopathy  6. Lung mass  7. Essential hypertension  8. End-stage renal disease stage IIIb A3  9. Type 2 diabetes mellitus  10. Hyperlipidemia  11. Obstructive sleep apnea on CPAP  12. Likely aspiration pneumonia and sepsis      Recommendations     1. Patient not in clinical CHF we will get an echocardiac to assess cardiac function wall motion valve morphology  2. We will try to get old records  3. Patient is not anticoagulated for history of A. fib we will try to get records  4. His blood pressures controlled continue current medication  5. Continue statin        Reason for consultation: Congestive heart failure     Subjective       Subjective     History of Present Illness     Feroz Canales is a 86 year old male with a past medical history significant for congestive heart failure, diabetes mellitus type 2, chronic kidney disease, BPH, severe aortic stenosis status post TAVR, essential hypertension and coronary artery disease status post CABG. Patient presented to the ER with complaints of altered mental status and abdominal pain. Daughter is at bedside and aids in the history. Patient was found to have severe hyponatremia in the ED with a sodium of 114. On evaluation today, mental status has improved. Daughter reports that patient follows with a cardiologist at [facility] Dr. Lee. He has a history of five vessel CABG in 2002 per daughter and also history of severe aortic stenosis status post TAVR in 2017. She reports he has been doing overall well from a cardiac standpoint over the last couple months. She reports he has a history of atrial  fibrillation but is not on anticoagulation due to unknown reasons. He does have a history of future sleep apnea and is compliant with CPAP at home. Denies any chest pain, palpitations, lower extremity edema or shortness of breath. Reportedly has a history of congestive heart failure however no echocardiogram is on file. He is on numerous heart failure medications at home. Creatinine admission 1.59 and has been overall stable.    Cardiac risk factors:arteriosclerotic heart disease, diabetes mellitus, hypercholesterolemia, hypertension and Sedentary life style    Past Medical History:   Diagnosis Date   • BPH with urinary obstruction    • CHF (congestive heart failure) (HCC)    • Chronic kidney disease    • Diabetes mellitus (HCC)    • Hypertension      Past Surgical History:   Procedure Laterality Date   • CARDIAC SURGERY     • CARDIAC VALVE REPLACEMENT       Family History   Problem Relation Age of Onset   • No Known Problems Father    • No Known Problems Mother      Social History     Tobacco Use   • Smoking status: Never Smoker   • Smokeless tobacco: Not on file   Substance Use Topics   • Alcohol use: No   • Drug use: No     Medications Prior to Admission   Medication Sig Dispense Refill Last Dose   • amLODIPine (NORVASC) 10 MG tablet Take 10 mg by mouth Every Night.  5 Past Week at Unknown time   • aspirin  MG tablet Take 325 mg by mouth Daily.  5 1/1/2022 at Unknown time   • carvedilol (COREG) 6.25 MG tablet Take 6.25 mg by mouth 2 (Two) Times a Day With Meals.  5 1/1/2022 at Unknown time   • cetirizine (zyrTEC) 10 MG tablet Take 10 mg by mouth Every Night.   Past Week at Unknown time   • ferrous sulfate 325 (65 FE) MG tablet Take 325 mg by mouth 2 (Two) Times a Day.  5 1/1/2022 at AM   • hydrALAZINE (APRESOLINE) 100 MG tablet Take 100 mg by mouth 3 (Three) Times a Day.  3 1/1/2022 at Unknown time   • hydroCHLOROthiazide (HYDRODIURIL) 25 MG tablet Take 25 mg by mouth Daily.   1/1/2022 at Unknown time   •  insulin glargine (LANTUS, SEMGLEE) 100 UNIT/ML injection Inject 25 Units under the skin into the appropriate area as directed Daily.   1/1/2022   • levothyroxine (SYNTHROID, LEVOTHROID) 75 MCG tablet Take 75 mcg by mouth Daily.  5 1/1/2022 at Unknown time   • losartan (COZAAR) 50 MG tablet Take 50 mg by mouth Every Night.   Past Week at Unknown time   • multivitamin (multivitamin) tablet tablet Take 1 tablet by mouth Daily.   1/1/2022 at Unknown time   • multivitamin with minerals (PreserVision AREDS) tablet tablet Take 1 tablet by mouth 2 (Two) Times a Day.   1/1/2022 at Unknown time   • rosuvastatin (CRESTOR) 40 MG tablet Take 40 mg by mouth Daily.  5 1/1/2022 at Unknown time   • spironolactone (ALDACTONE) 25 MG tablet Take 25 mg by mouth Daily.  5 1/1/2022 at Unknown time   • terazosin (HYTRIN) 5 MG capsule Take 5 mg by mouth Every Night.  1 Past Week at Unknown time   • bumetanide (BUMEX) 1 MG tablet Take 1 mg by mouth Every Other Day As Needed (Weight gain over 4 pounds over baseline).  2 Unknown at Unknown time   • colchicine 0.6 MG tablet Take 0.6 mg by mouth Daily As Needed for Muscle / Joint Pain.   Unknown at Unknown time   • fluticasone (FLONASE) 50 MCG/ACT nasal spray 2 sprays into the nostril(s) as directed by provider Daily As Needed for Rhinitis.   Unknown at Unknown time   • gabapentin (NEURONTIN) 600 MG tablet Take 600 mg by mouth Daily As Needed (Pain). Per daughter, states he takes this very rarely.  2 Unknown at Unknown time   • HYDROcodone-acetaminophen (NORCO) 5-325 MG per tablet Take 1 tablet by mouth 2 (Two) Times a Day As Needed for Mild Pain .  0 Unknown at Unknown time   • polyethylene glycol (MIRALAX) powder Take 17 g by mouth Daily As Needed (Constipation).  11 Unknown at Unknown time     Allergies:  Keflex [cephalexin] and Sulfa antibiotics    Review of Systems   Constitutional: Negative for fatigue and fever.   HENT: Negative for congestion and trouble swallowing.    Eyes: Negative for  photophobia and visual disturbance.   Respiratory: Negative for chest tightness and shortness of breath.    Cardiovascular: Negative for chest pain and palpitations.   Gastrointestinal: Positive for abdominal pain. Negative for vomiting.   Endocrine: Negative for polyphagia and polyuria.   Genitourinary: Negative for dysuria and hematuria.   Skin: Negative for rash.   Allergic/Immunologic: Negative for food allergies and immunocompromised state.   Neurological: Negative for dizziness and syncope.   Psychiatric/Behavioral: Positive for confusion. Negative for suicidal ideas.       Objective     Objective      Vital Signs  Temp:  [98.7 °F (37.1 °C)-100.3 °F (37.9 °C)] 98.9 °F (37.2 °C)  Heart Rate:  [72-92] 72  Resp:  [18-24] 18  BP: (133-163)/(53-78) 133/53     Vital Signs (last 72 hrs)       01/01 0700  01/02 0659 01/02 0700  01/03 0659 01/03 0700  01/04 0659 01/04 0700  01/04 1454   Most Recent      Temp (°F)   96.4    97.2 -  99.4    98 -  100.3      98.9     98.9 (37.2) 01/04 1021    Heart Rate 55 -  85    71 -  82    81 -  92      72     72 01/04 1021    Resp   22    18 -  20    18 -  24      18     18 01/04 1021    /67 -  177/86    113/96 -  167/75    145/62 -  163/78      133/53     133/53 01/04 1021    SpO2 (%) 95 -  99    90 -  99    91 -  100      97     97 01/04 1021        Body mass index is 34.62 kg/m².  Documented weights    01/01/22 2249 01/03/22 0002 01/03/22 0403 01/04/22 0500   Weight: 86.2 kg (190 lb) 92 kg (202 lb 14.4 oz) 91.8 kg (202 lb 6.4 oz) 97.3 kg (214 lb 8 oz)            Intake/Output Summary (Last 24 hours) at 1/4/2022 1454  Last data filed at 1/4/2022 0800  Gross per 24 hour   Intake 620 ml   Output --   Net 620 ml     Physical Exam  Constitutional:       General: He is not in acute distress.     Appearance: Normal appearance. He is well-developed.      Comments: Chronically ill appearing   HENT:      Head: Normocephalic and atraumatic.      Mouth/Throat:      Mouth: Mucous  membranes are moist.   Eyes:      Extraocular Movements: Extraocular movements intact.      Pupils: Pupils are equal, round, and reactive to light.   Neck:      Vascular: No JVD.   Cardiovascular:      Rate and Rhythm: Normal rate and regular rhythm.      Heart sounds: Normal heart sounds. No murmur heard.  No S3 or S4 sounds.    Pulmonary:      Effort: Pulmonary effort is normal. No respiratory distress.      Breath sounds: Rhonchi present. No wheezing.   Abdominal:      General: Bowel sounds are normal. There is no distension.      Palpations: Abdomen is soft. There is no hepatomegaly.      Tenderness: There is no abdominal tenderness.   Musculoskeletal:         General: Normal range of motion.      Cervical back: Normal range of motion and neck supple.   Skin:     General: Skin is warm and dry.      Coloration: Skin is not jaundiced or pale.   Neurological:      General: No focal deficit present.      Mental Status: He is alert and oriented to person, place, and time. Mental status is at baseline.   Psychiatric:         Mood and Affect: Mood normal.         Behavior: Behavior normal.         Thought Content: Thought content normal.         Judgment: Judgment normal.       Results review     Results Review:    I reviewed the patient's new clinical results.  Results from last 7 days   Lab Units 01/01/22  2229   CK TOTAL U/L 345*   TROPONIN T ng/mL 0.013     Results from last 7 days   Lab Units 01/04/22  0803 01/03/22  0538 01/02/22  1310 01/01/22 2229   WBC 10*3/mm3 12.26* 12.55* 13.22* 13.06*   HEMOGLOBIN g/dL 9.6* 9.6* 10.3* 10.8*   PLATELETS 10*3/mm3 156 164 148 175     Results from last 7 days   Lab Units 01/04/22  1221 01/04/22  0803 01/04/22  0414 01/03/22  2358 01/03/22  2005 01/03/22  1602 01/03/22  1216 01/03/22  0538 01/03/22  0538 01/02/22  2334 01/02/22  1843 01/02/22  1229 01/02/22  1229 01/02/22  0838 01/02/22  0838 01/02/22  0606 01/02/22  0606 01/02/22  0135 01/02/22  0135 01/01/22  2229  01/01/22  2229   SODIUM mmol/L 121* 124* 121* 121* 119* 117* 117*   < > 121*  121*   < > 122*   < > 122*   < > 121*   < > 119*   < > 118*   < > 114*   POTASSIUM mmol/L  --  4.3  --   --   --   --   --   --  4.1  4.1  --  4.1  --  4.2  --  4.4  --  4.2  --  4.2   < > 3.8   CHLORIDE mmol/L  --  95*  --   --   --   --   --   --  92*  92*  --  89*  --  90*  --  87*  --  86*  --  85*   < > 82*   CO2 mmol/L  --  17.0*  --   --   --   --   --   --  19.6*  19.6*  --  21.0*  --  22.7  --  21.4*  --  19.7*  --  18.4*   < > 18.5*   BUN mg/dL  --  21  --   --   --   --   --   --  18  18  --  18  --  20  --  21  --  22  --  22   < > 24*   CREATININE mg/dL  --  1.38*  --   --   --   --   --   --  1.46*  1.46*  --  1.42*  --  1.50*  --  1.46*  --  1.50*  --  1.48*   < > 1.59*   CALCIUM mg/dL  --  9.0  --   --   --   --   --   --  9.0  9.0  --  9.2  --  9.3  --  9.4  --  9.6  --  9.5   < > 9.7   GLUCOSE mg/dL  --  122*  --   --   --   --   --   --  116*  116*  --  136*  --  120*  --  146*  --  157*  --  176*   < > 164*   ALT (SGPT) U/L  --   --   --   --   --   --   --   --  23  --   --   --   --   --   --   --   --   --   --   --  22   AST (SGOT) U/L  --   --   --   --   --   --   --   --  49*  --   --   --   --   --   --   --   --   --   --   --  37    < > = values in this interval not displayed.     Lab Results   Component Value Date    INR 0.92 01/01/2022     Lab Results   Component Value Date    MG 1.8 01/01/2022     Lab Results   Component Value Date    TSH 1.960 01/02/2022    CHLPL 165 04/17/2014    TRIG 183 (H) 04/17/2014    HDL 38 (L) 04/17/2014    LDL 90 04/17/2014      Lab Results   Component Value Date    PROBNP 413.0 01/02/2022       ECG         ECG/EMG Results (last 24 hours)     Procedure Component Value Units Date/Time    ECG 12 Lead [[id number]] Collected: 01/03/22 0707     Updated: 01/04/22 1049     QT Interval 434 ms      QTC Interval 512 ms     Narrative:      Test Reason : LBBB  Blood Pressure :   */*    mmHG  Vent. Rate :  84 BPM     Atrial Rate :  84 BPM     P-R Int : 290 ms          QRS Dur : 152 ms      QT Int : 434 ms       P-R-T Axes :   *  67 235 degrees     QTc Int : 512 ms    Sinus rhythm with sinus arrhythmia with 1st degree AV block  Left bundle branch block  Abnormal ECG  When compared with ECG of 01-JAN-2022 23:06,  No significant change was found  Confirmed by Torres Huerta ([id number]) on 1/4/2022 10:48:54 AM    Referred By: NAHEED           Confirmed By: Torres Huerta          Imaging Results (Last 72 Hours)     Procedure Component Value Units Date/Time    MRI Brain Without Contrast [[id number]] Collected: 01/04/22 1158     Updated: 01/04/22 1200    Narrative:      EXAM:    MR Head Without Intravenous Contrast     EXAM DATE:    1/4/2022 10:58 AM     CLINICAL HISTORY:    NPH?, family deferred LP; E87.7-Mozt-ylixyuedhr and hyponatremia     TECHNIQUE:    Magnetic resonance images of the head/brain without intravenous  contrast in multiple planes.     COMPARISON:    No relevant prior studies available.     FINDINGS:    BRAIN:  No evidence of acute infarction.  No hemorrhage.    VENTRICLES:  Ventriculomegaly.    BONES/JOINTS:  Unremarkable.    SINUSES:  Unremarkable as visualized.  No acute sinusitis.    MASTOID AIR CELLS:  Unremarkable as visualized.  No mastoid effusion.    ORBITS:  Unremarkable as visualized.       Impression:      1.  Ventriculomegaly.  2.  No evidence of acute infarction.     This report was finalized on 1/4/2022 11:58 AM by Dr. Brayden Davis MD.       SCANNED - IMAGING [[id number]] Resulted: 01/01/22     Updated: 01/04/22 0031    XR Chest 1 View [[id number]] Collected: 01/03/22 1445     Updated: 01/03/22 1448    Narrative:      EXAM:    XR Chest, 1 View     EXAM DATE:    1/3/2022 2:19 PM     CLINICAL HISTORY:    dyspnea; E87.8-Rcwn-[id number] and hyponatremia     TECHNIQUE:    Frontal view of the chest.     COMPARISON:    08/26/2015     FINDINGS:    LUNGS:  Vague bibasilar airspace disease.     PLEURAL SPACE:  Unremarkable.  No pneumothorax.    HEART:  Cardiomegaly.    MEDIASTINUM:  Unremarkable.    BONES/JOINTS:  Unremarkable.       Impression:      1.  Cardiomegaly.  2.  Vague bibasilar airspace disease.     This report was finalized on 1/3/2022 2:46 PM by Dr. Brayden Davis MD.       CT Abdomen Pelvis Without Contrast [[id number]] Collected: 01/01/22 2301     Updated: 01/01/22 2303    Narrative:      CT ABDOMEN AND PELVIS, NONCONTRAST, 1/1/2022    HISTORY:  86-year-old male in the ED with acute mental status change, nausea and vomiting.    TECHNIQUE:  CT imaging of the abdomen and pelvis without oral or IV contrast. Radiation dose reduction techniques included automated exposure control. Radiation audit for CT and nuclear cardiology exams in the last 12 months: 0.    ABDOMEN FINDINGS:  Significant technical limitations related patient motion artifact and restricted positioning.    Tiny stone material within nondistended gallbladder. No bile duct dilatation. Liver, pancreas and spleen are within normal limits.    The kidneys are unobstructed. Normal caliber abdominal aorta. Normal adrenal glands.    Small bowel and colon are normal in caliber and appearance, as imaged. Normal appendix. No gastric distention. Small hiatal hernia.    PELVIS FINDINGS:  Moderately distended urinary bladder with only mild prostate enlargement. Rectum is negative. Small indirect fat-containing left inguinal hernia. No free pelvic fluid.      Impression:      1.  No acute abnormality within the abdomen or pelvis. Technical limitations as noted.  2.  Cholelithiasis.  3.  Hiatal hernia.  4.  Distended urinary bladder. No evidence of upper urinary tract obstruction.    Signer Name: Jimmy Dallas MD   Signed: 1/1/2022 11:01 PM   Workstation Name: [email or web address]    [facility]    CT Chest Without Contrast Diagnostic [[id number]] Collected: 01/01/22 2259     Updated: 01/01/22 2301    Narrative:      CT CHEST,  NONCONTRAST, 1/1/2022    HISTORY:  86-year-old male in the ED with acute mental status change, nausea and vomiting.    TECHNIQUE:  CT imaging of the chest ordered without IV contrast. Radiation dose reduction techniques included automated exposure control. Radiation audit for CT and nuclear cardiology exams in the last 12 months: 0.    FINDINGS:  Mild patchy infiltrates in both lung bases along with basilar atelectasis. The nondependent portions of both lungs are clear.    Prior transcatheter aortic valve replacement. CABG. Mild cardiomegaly. No pericardial effusion. Normal caliber thoracic aorta.    There is a suspicious irregular mass in the posterior left costophrenic angle measuring 2.2 x 2.0 cm. Malignancy is not excluded. Comparison with any available prior outside studies is recommended.    Small hiatal hernia with mild thoracic esophageal dilatation.      Impression:      1.  Mild infiltrate and/or atelectasis in the dependent posterior lung bases.  2.  Mild cardiomegaly. CABG. TAVR.  3.  Small suspicious irregular mass in the posterior left costophrenic angle. This measures up to 2.2 cm. Recommend short interval follow-up chest CT in 2-3 months. This persists, a follow-up PET/CT examination is recommended.    Signer Name: Jimmy Dallas MD   Signed: 1/1/2022 10:59 PM   Workstation Name: [email or web address]    [facility]    CT Head Without Contrast Stroke Protocol [[id number]] Collected: 01/01/22 2233     Updated: 01/01/22 2236    Narrative:      CT Head WO Code Stroke    HISTORY:   Altered mental status tonight    TECHNIQUE:   Axial unenhanced head CT. Radiation dose reduction techniques included automated exposure control or exposure modulation based on body size. Count of known CT and cardiac nuc med studies performed in previous 12 months: 0.     Time of scan: 20 3:00 PM    COMPARISON:   None.    FINDINGS:   No intracranial hemorrhage, mass, or infarct. No hydrocephalus or  extra-axial fluid collection. There is prominence of the lateral ventricles out of portion to the sulci. There are no masses or extra-axial fluid collections or hemorrhage. Third ventricle  is also prominent. The skull base, calvarium, and extracranial soft tissues are normal.      Impression:      No acute findings. There is prominence of the lateral and third ventricles suggesting normal pressure hydrocephalus. Fourth ventricle is normal..          Signer Name: Polo Norman MD   Signed: 1/1/2022 10:33 PM   Workstation Name: [facility]    [facility] [place]          I have discussed my impression and recommendations with the patient and family.    Thank you very much for asking us to be involved in this patient's care.  We will follow along with you.      Electronically signed by NAHOMI Kelley, 01/04/22, 2:55 PM EST.  Electronically signed by Cee Carcamo MD, 01/04/22, 4:20 PM EST.     Please note that portions of this note were completed with a voice recognition program.

## 2022-01-04 NOTE — CONSULTS
Referring Provider: dr Alvarenga   Reason for Consultation: pulm nodule       Chief complaint - sob      History of present illness:  -85 yo male with PMHx significant for CHF (details unknown), diabetes mellitus, CKD, BPH, history of cardiac valve replacement and hypertension who presents to the ED complaining of altered mental status and abdominal pain. There was also report per ED documentation that the patient had vomiting and was unsteady when ambulating and suffered a fall.   Patient found to have severe hyponatremia in the ED (114). Patient also had urinary retention and a stephen catheter was placed while in the emergency department.   Patient was  Seen today.  Patient's daughter at bedside.  Most of the history obtained from chart and patient's daughter.  Pulmonary is consulted for abnormal CT chest with pulmonary nodule.  All the labs images medications ins and outs vitals were personally reviewed by me.  CT images reviewed and shown to patient and patient at bedside.    Review of Systems  Review of Systems -     Review of Systems - History obtained from chart review and the patient  General ROS: negative for - chills, fatigue or fever  Psychological ROS: negative for - anxiety or depression  ENT ROS: negative for - headaches, visual changes or vocal changes  Respiratory ROS: positive for - cough and shortness of breath  Cardiovascular ROS: no chest pain or dyspnea on exertion  Gastrointestinal ROS: no abdominal pain, change in bowel habits, or black or bloody stools  Musculoskeletal ROS: negative for - joint pain, joint stiffness or joint swelling  Neurological ROS: no TIA or stroke symptoms  Heme- no bleeding  Skin- no bruises, no rash        History  Past Medical History:   Diagnosis Date   • BPH with urinary obstruction    • CHF (congestive heart failure) (HCC)    • Chronic kidney disease    • Diabetes mellitus (HCC)    • Hypertension    ,   Past Surgical History:   Procedure Laterality Date   • CARDIAC  SURGERY     • CARDIAC VALVE REPLACEMENT     ,   Family History   Problem Relation Age of Onset   • No Known Problems Father    • No Known Problems Mother    ,   Social History     Tobacco Use   • Smoking status: Never Smoker   • Smokeless tobacco: Not on file   Substance Use Topics   • Alcohol use: No   • Drug use: No   ,   Medications Prior to Admission   Medication Sig Dispense Refill Last Dose   • amLODIPine (NORVASC) 10 MG tablet Take 10 mg by mouth Every Night.  5 Past Week at Unknown time   • aspirin  MG tablet Take 325 mg by mouth Daily.  5 1/1/2022 at Unknown time   • carvedilol (COREG) 6.25 MG tablet Take 6.25 mg by mouth 2 (Two) Times a Day With Meals.  5 1/1/2022 at Unknown time   • cetirizine (zyrTEC) 10 MG tablet Take 10 mg by mouth Every Night.   Past Week at Unknown time   • ferrous sulfate 325 (65 FE) MG tablet Take 325 mg by mouth 2 (Two) Times a Day.  5 1/1/2022 at AM   • hydrALAZINE (APRESOLINE) 100 MG tablet Take 100 mg by mouth 3 (Three) Times a Day.  3 1/1/2022 at Unknown time   • hydroCHLOROthiazide (HYDRODIURIL) 25 MG tablet Take 25 mg by mouth Daily.   1/1/2022 at Unknown time   • insulin glargine (LANTUS, SEMGLEE) 100 UNIT/ML injection Inject 25 Units under the skin into the appropriate area as directed Daily.   1/1/2022   • levothyroxine (SYNTHROID, LEVOTHROID) 75 MCG tablet Take 75 mcg by mouth Daily.  5 1/1/2022 at Unknown time   • losartan (COZAAR) 50 MG tablet Take 50 mg by mouth Every Night.   Past Week at Unknown time   • multivitamin (multivitamin) tablet tablet Take 1 tablet by mouth Daily.   1/1/2022 at Unknown time   • multivitamin with minerals (PreserVision AREDS) tablet tablet Take 1 tablet by mouth 2 (Two) Times a Day.   1/1/2022 at Unknown time   • rosuvastatin (CRESTOR) 40 MG tablet Take 40 mg by mouth Daily.  5 1/1/2022 at Unknown time   • spironolactone (ALDACTONE) 25 MG tablet Take 25 mg by mouth Daily.  5 1/1/2022 at Unknown time   • terazosin (HYTRIN) 5 MG  capsule Take 5 mg by mouth Every Night.  1 Past Week at Unknown time   • bumetanide (BUMEX) 1 MG tablet Take 1 mg by mouth Every Other Day As Needed (Weight gain over 4 pounds over baseline).  2 Unknown at Unknown time   • colchicine 0.6 MG tablet Take 0.6 mg by mouth Daily As Needed for Muscle / Joint Pain.   Unknown at Unknown time   • fluticasone (FLONASE) 50 MCG/ACT nasal spray 2 sprays into the nostril(s) as directed by provider Daily As Needed for Rhinitis.   Unknown at Unknown time   • gabapentin (NEURONTIN) 600 MG tablet Take 600 mg by mouth Daily As Needed (Pain). Per daughter, states he takes this very rarely.  2 Unknown at Unknown time   • HYDROcodone-acetaminophen (NORCO) 5-325 MG per tablet Take 1 tablet by mouth 2 (Two) Times a Day As Needed for Mild Pain .  0 Unknown at Unknown time   • polyethylene glycol (MIRALAX) powder Take 17 g by mouth Daily As Needed (Constipation).  11 Unknown at Unknown time   , Scheduled Meds:  aspirin EC, 325 mg, Oral, Daily  aztreonam, 2 g, Intravenous, Q8H  heparin (porcine), 5,000 Units, Subcutaneous, Q12H  insulin aspart, 0-7 Units, Subcutaneous, TID AC  levothyroxine, 75 mcg, Oral, Q AM  metroNIDAZOLE, 500 mg, Intravenous, Q8H  sodium chloride, 10 mL, Intravenous, Q12H    , Continuous Infusions:    and Allergies:  Keflex [cephalexin] and Sulfa antibiotics    Objective     Vital Signs   Temp:  [98.3 °F (36.8 °C)-100.3 °F (37.9 °C)] 98.3 °F (36.8 °C)  Heart Rate:  [68-92] 68  Resp:  [18-24] 18  BP: (133-163)/(53-78) 149/60    Physical Exam:             General- normal in appearance, not in any acute distress    HEENT- pupils equally reactive to light, normal in size, no scleral icterus    Neck-supple    Respiratory-respirations normal-on auscultation no wheezing no crackles,     Cardiovascular-  Normal S1 and S2. No S3, S4 or murmurs. No JVD, no carotid bruit and no edema, pulses normal bilaterally     GI-nontender nondistended bowel sounds  positive    CNS-nonfocal    Musculoskeletal -no edema  Extremities- no obvious deformity noticed     Psychiatric-mood good, good eye contact, alert awake oriented  Skin- no visible rash                                                                   Results Review:    LABS:    Lab Results   Component Value Date    GLUCOSE 122 (H) 01/04/2022    BUN 21 01/04/2022    CREATININE 1.38 (H) 01/04/2022    EGFRIFNONA 49 (L) 01/04/2022    BCR 15.2 01/04/2022    CO2 17.0 (L) 01/04/2022    CALCIUM 9.0 01/04/2022    ALBUMIN 3.69 01/03/2022    LABIL2 1.8 03/10/2015    AST 49 (H) 01/03/2022    ALT 23 01/03/2022    WBC 12.26 (H) 01/04/2022    HGB 9.6 (L) 01/04/2022    HCT 29.7 (L) 01/04/2022    MCV 97.1 (H) 01/04/2022     01/04/2022     (L) 01/04/2022    K 4.3 01/04/2022    CL 95 (L) 01/04/2022    ANIONGAP 12.0 01/04/2022       Lab Results   Component Value Date    INR 0.92 01/01/2022    PROTIME 12.8 01/01/2022       Results from last 7 days   Lab Units 01/01/22  2229   INR  0.92   APTT seconds 52.4*          I reviewed the patient's new clinical results.  I reviewed the patient's new imaging results and agree with the interpretation.      Assessment/Plan   Patient Active Problem List   Diagnosis   • CKD (chronic kidney disease) stage 3, GFR 30-59 ml/min ([facility])   • Detrusor instability   • Hyponatremia     Abnormal CT chest-    TECHNIQUE:  CT imaging of the chest ordered without IV contrast. Radiation dose reduction techniques included automated exposure control. Radiation audit for CT and nuclear cardiology exams in the last 12 months: 0.     FINDINGS:  Mild patchy infiltrates in both lung bases along with basilar atelectasis. The nondependent portions of both lungs are clear.     Prior transcatheter aortic valve replacement. CABG. Mild cardiomegaly. No pericardial effusion. Normal caliber thoracic aorta.     There is a suspicious irregular mass in the posterior left costophrenic angle measuring 2.2 x 2.0 cm.  Malignancy is not excluded. Comparison with any available prior outside studies is recommended.     Small hiatal hernia with mild thoracic esophageal dilatation.     IMPRESSION:  1.  Mild infiltrate and/or atelectasis in the dependent posterior lung bases.  2.  Mild cardiomegaly. CABG. TAVR.  3.  Small suspicious irregular mass in the posterior left costophrenic angle. This measures up to 2.2 cm. Recommend short interval follow-up chest CT in 2-3 months. This persists, a follow-up PET/CT examination is recommended.      CT images reviewed and discussed with patient and patient's daughter.  Patient and patient's family live close to [place].  Asked her to follow-up with pulmonologist close by.  Repeat CT chest in 2 months.      Family member present-daughter.  Case discussed with her in great detail.      Hyponatremia-stable continue management as per primary team and nephrology.    Episode of aspiration-do not see any major changes on the CT chest.  Patient has been weaned off oxygen.  Continue to monitor.  Likely had aspiration pneumonitis.  This was discussed with patient's daughter who is a nurse by profession.  Latest microbiology reviewed.  Continue current antibiotics.  Microbiology Results (last 10 days)     Procedure Component Value - Date/Time    Blood Culture - Blood, Arm, Left [[id number]]  (Normal) Collected: 01/01/22 2327    Lab Status: Preliminary result Specimen: Blood from Arm, Left Updated: 01/03/22 2330     Blood Culture No growth at 2 days    Blood Culture - Blood, Arm, Right [[id number]]  (Normal) Collected: 01/01/22 2315    Lab Status: Preliminary result Specimen: Blood from Arm, Right Updated: 01/03/22 2330     Blood Culture No growth at 2 days    COVID-19 and FLU A/B PCR - Swab, Nasopharynx [[id number]]  (Normal) Collected: 01/01/22 2257    Lab Status: Final result Specimen: Swab from Nasopharynx Updated: 01/01/22 2340     COVID19 Not Detected     Influenza A PCR Not Detected     Influenza B  PCR Not Detected    Narrative:      Fact sheet for providers: https://www.fda.gov/media/232967/download    Fact sheet for patients: https://www.fda.gov/media/611528/download    Test performed by PCR.              Hyponatremia          Cosme Stanley MD  01/04/22  18:20 EST

## 2022-01-04 NOTE — PLAN OF CARE
Patient resting well in the bed throughout the night. Was not able to keep patients home unit CPAP on him, he kept pulling off, or unplugging machine, talked to Naima his daughter and we planned to keep him on 2L NC during the night and take him off the CPAP. YESSENIA Matthew PA aware. His O2 is stable on 2L NC at this time. His Sodium at this time is 121 and all fluids are D/C per Dr. Rooney and patient is cont on 1000 ml fluid restriction. No s/s of distress noted. No complaints. Will continue to monitor and follow care plan.

## 2022-01-04 NOTE — PROGRESS NOTES
[facility] HOSPITALIST PROGRESS NOTE     Patient Identification:  Name:  Feroz Canales  Age:  86 y.o.  Sex:  male  :  1935  MRN:  [id number]  Visit Number:  [id number]  ROOM: [address] [place] [address]     Primary Care Provider:  Provider, No Known    Length of stay in inpatient status:  2    Subjective     Chief Compliant:    Chief Complaint   Patient presents with   • Altered Mental Status   • Vomiting     History of Presenting Illness:    Patient remains ill, no acute events overnight, no new complaints, confusion resolved, sitting in bedside chair, have weaned off oxygen, cough less persistent, reports he is feeling better, BP has been controlled, Cr improved 1.38, Na trending up, MRI w/ ventriculomegaly, daughter previously deferred LP, TeleNeuro consulted for recs, pulm consult pending for spiculated lung lesion, echo pending but consulted cardiology as on pretty significant CHF regimen prior to admission which have all been held due to sepsis and mild renal insufficiency, obtained records from [place] but were for soft tissue neck and not Head or chest as requested, continued on Abx, Bcx's NGTD, appetite improved and have been holding home levemir but likely will need to resume soon.   Objective     Current Hospital Meds:aspirin EC, 325 mg, Oral, Daily  aztreonam, 2 g, Intravenous, Q8H  heparin (porcine), 5,000 Units, Subcutaneous, Q12H  insulin aspart, 0-7 Units, Subcutaneous, TID AC  levothyroxine, 75 mcg, Oral, Q AM  metroNIDAZOLE, 500 mg, Intravenous, Q8H  sodium chloride, 10 mL, Intravenous, Q12H         Current Antimicrobial Therapy:  Anti-Infectives (From admission, onward)    Ordered     Dose/Rate Route Frequency Start Stop    22 0323  metroNIDAZOLE (FLAGYL) 500 mg/100mL IVPB        Ordering Provider: Isabel Servin DO    500 mg Intravenous Every 8 Hours 22 0800 01/10/22 0759    22 1236  aztreonam (AZACTAM) 2 g in sodium chloride 0.9 % 100 mL IVPB-VTB        Ordering Provider:  Chago Alvarenga MD    2 g  over 4 Hours Intravenous Every 8 Hours 01/02/22 1921 01/09/22 1959    01/02/22 1236  aztreonam (AZACTAM) 2 g in sodium chloride 0.9 % 100 mL IVPB-VTB        Ordering Provider: Nikkie Escamilla PA    2 g  200 mL/hr over 30 Minutes Intravenous Once 01/02/22 1238 01/02/22 1326    01/02/22 0953  vancomycin 1750 mg/500 mL 0.9% NS IVPB ([facility])        Ordering Provider: Seth Nagy MD    20 mg/kg × 86.2 kg  over 2 Hours Intravenous Once 01/02/22 0955 01/02/22 1205    01/02/22 0306  clindamycin (CLEOCIN) 600 mg in dextrose 5% 50 mL IVPB (premix)        Ordering Provider: Avinash Centeno DO    600 mg Intravenous Once 01/02/22 0308 01/02/22 0537        Current Diuretic Therapy:  Diuretics (From admission, onward)    None        ----------------------------------------------------------------------------------------------------------------------  Vital Signs:  Temp:  [98.5 °F (36.9 °C)-100.3 °F (37.9 °C)] 98.9 °F (37.2 °C)  Heart Rate:  [72-92] 72  Resp:  [18-24] 18  BP: (133-163)/(53-78) 133/53  SpO2:  [91 %-100 %] 97 %  on  Flow (L/min):  [2] 2;   Device (Oxygen Therapy): room air  Body mass index is 34.62 kg/m².    Wt Readings from Last 3 Encounters:   01/04/22 97.3 kg (214 lb 8 oz)     Intake & Output (last 3 days)       01/01 0701 01/02 0700 01/02 0701 01/03 0700 01/03 0701 01/04 0700 01/04 0701 01/05 0700    P.O.  100 2300 120    IV Piggyback 550 700      Total Intake(mL/kg) 550 (6.4) 800 (8.7) 2300 (23.6) 120 (1.2)    Urine (mL/kg/hr) 1000 700 (0.3) 400 (0.2)     Total Output 1000 700 400     Net -450 +100 +1900 +120                Diet Regular; Daily Fluid Restriction, Consistent Carbohydrate, Renal; 1000 mL Fluid Per Day  ----------------------------------------------------------------------------------------------------------------------  Physical exam:  Constitutional:  Elderly, No acute distress.      HENT:  Head:  Normocephalic and atraumatic.  Mouth:  Moist mucous  membranes.    Eyes:  Conjunctivae and EOM are normal. No scleral icterus.    Neck:  Neck supple.  No JVD present.    Cardiovascular:  Normal rate, regular rhythm and normal heart sounds with no murmur.  Pulmonary/Chest:  No respiratory distress, no wheezes, no crackles, on room air now  Abdominal:  Soft. No distension and no tenderness.   Musculoskeletal:  No tenderness, and no deformity.     Neurological:  Alert and oriented to person, place.  No cranial nerve deficit.    Skin:  Skin is warm and dry. No rash noted. No pallor.   Peripheral vascular:  No clubbing, no cyanosis, no edema.  ----------------------------------------------------------------------------------------------------------------------  Results from last 7 days   Lab Units 01/04/22  0803 01/03/22  0538 01/02/22  1310 01/02/22  1229 01/01/22  2231 01/01/22  2229 01/01/22  2229   CRP mg/dL  --   --   --  0.66*  --   --  <0.30   LACTATE mmol/L  --   --   --   --  1.5  --   --    WBC 10*3/mm3 12.26* 12.55* 13.22*  --   --    < > 13.06*   HEMOGLOBIN g/dL 9.6* 9.6* 10.3*  --   --    < > 10.8*   HEMATOCRIT % 29.7* 29.2* 31.0*  --   --    < > 32.3*   MCV fL 97.1* 96.4 93.9  --   --    < > 93.6   MCHC g/dL 32.3 32.9 33.2  --   --    < > 33.4   PLATELETS 10*3/mm3 156 164 148  --   --    < > 175   INR   --   --   --   --   --   --  0.92    < > = values in this interval not displayed.     Results from last 7 days   Lab Units 01/01/22  2306   PH, ARTERIAL pH units 7.409   PO2 ART mm Hg 68.6*   PCO2, ARTERIAL mm Hg 31.8*   HCO3 ART mmol/L 20.0     Results from last 7 days   Lab Units 01/04/22  1221 01/04/22  0803 01/04/22  0414 01/03/22  1216 01/03/22  0538 01/02/22  2334 01/02/22  1843 01/02/22  0135 01/01/22  2229   SODIUM mmol/L 121* 124* 121*   < > 121*  121*   < > 122*   < > 114*   POTASSIUM mmol/L  --  4.3  --   --  4.1  4.1  --  4.1   < > 3.8   MAGNESIUM mg/dL  --   --   --   --   --   --   --   --  1.8   CHLORIDE mmol/L  --  95*  --   --  92*  92*  --   89*   < > 82*   CO2 mmol/L  --  17.0*  --   --  19.6*  19.6*  --  21.0*   < > 18.5*   BUN mg/dL  --  21  --   --  18  18  --  18   < > 24*   CREATININE mg/dL  --  1.38*  --   --  1.46*  1.46*  --  1.42*   < > 1.59*   EGFR IF NONAFRICN AM mL/min/1.73  --  49*  --   --  46*  46*  --  47*   < > 41*   CALCIUM mg/dL  --  9.0  --   --  9.0  9.0  --  9.2   < > 9.7   GLUCOSE mg/dL  --  122*  --   --  116*  116*  --  136*   < > 164*   ALBUMIN g/dL  --   --   --   --  3.69  --   --   --  4.26   BILIRUBIN mg/dL  --   --   --   --  0.3  --   --   --  0.5   ALK PHOS U/L  --   --   --   --  67  --   --   --  89   AST (SGOT) U/L  --   --   --   --  49*  --   --   --  37   ALT (SGPT) U/L  --   --   --   --  23  --   --   --  22    < > = values in this interval not displayed.   Estimated Creatinine Clearance: 42 mL/min (A) (by C-G formula based on SCr of 1.38 mg/dL (H)).  Ammonia   Date Value Ref Range Status   01/01/2022 17 16 - 60 umol/L Final     Comment:     Specimen hemolyzed.  Results may be affected.     Results from last 7 days   Lab Units 01/01/22  2229   CK TOTAL U/L 345*   TROPONIN T ng/mL 0.013     Results from last 7 days   Lab Units 01/02/22  0135   PROBNP pg/mL 413.0         Glucose   Date/Time Value Ref Range Status   01/04/2022 1023 129 70 - 130 mg/dL Final     Comment:     Meter: [id number] : [id number] ANN AMBRIZ   01/04/2022 0644 125 70 - 130 mg/dL Final     Comment:     Meter: [id number] : [id number] ANN AMBRIZ   01/03/2022 1655 139 (H) 70 - 130 mg/dL Final     Comment:     Meter: [id number] : [id number] JOSE FISCHER   01/03/2022 1432 195 (H) 70 - 130 mg/dL Final     Comment:     Meter: [id number] : [id number] JOSE FISCHER   01/02/2022 2350 126 70 - 130 mg/dL Final     Comment:     Meter: [id number] : [id number] IVETT COOK   01/02/2022 1142 130 70 - 130 mg/dL Final     Comment:     Meter: [id number] : [id number] CHARLES SPRAY   01/02/2022 0947 140 (H) 70 - 130 mg/dL Final      Comment:     Meter: [id number] : [id number] CHARLES SPRAY     Lab Results   Component Value Date    TSH 1.960 01/02/2022     No results found for: PREGTESTUR, PREGSERUM, HCG, HCGQUANT  Pain Management Panel     Pain Management Panel Latest Ref Rng & Units 1/2/2022 6/25/2020    CREATININE UR mg/dL - 56.2    AMPHETAMINES SCREEN, URINE Negative Negative -    BARBITURATES SCREEN Negative Negative -    BENZODIAZEPINE SCREEN, URINE Negative Negative -    BUPRENORPHINEUR Negative Negative -    COCAINE SCREEN, URINE Negative Negative -    METHADONE SCREEN, URINE Negative Negative -    METHAMPHETAMINEUR Negative Negative -        Brief Urine Lab Results  (Last result in the past 365 days)      Color   Clarity   Blood   Leuk Est   Nitrite   Protein   CREAT   Urine HCG        01/02/22 0029 Yellow   Clear   Negative   Negative   Negative   100 mg/dL (2+)               Blood Culture   Date Value Ref Range Status   01/01/2022 No growth at 2 days  Preliminary   01/01/2022 No growth at 2 days  Preliminary     No results found for: URINECX  No results found for: WOUNDCX  No results found for: STOOLCX  No results found for: RESPCX  No results found for: AFBCX  Results from last 7 days   Lab Units 01/02/22  1310 01/02/22  1229 01/01/22  2231 01/01/22  2229   LACTATE mmol/L  --   --  1.5  --    SED RATE mm/hr 11  --   --  26*   CRP mg/dL  --  0.66*  --  <0.30     I have personally looked at the labs and they are summarized above.  ----------------------------------------------------------------------------------------------------------------------  Detailed radiology reports for the last 24 hours:  Imaging Results (Last 24 Hours)     Procedure Component Value Units Date/Time    MRI Brain Without Contrast [[id number]] Collected: 01/04/22 1158     Updated: 01/04/22 1200    Narrative:      EXAM:    MR Head Without Intravenous Contrast     EXAM DATE:    1/4/2022 10:58 AM     CLINICAL HISTORY:    NPH?, family deferred LP;  E87.3-Khku-irwzbaeptw and hyponatremia     TECHNIQUE:    Magnetic resonance images of the head/brain without intravenous  contrast in multiple planes.     COMPARISON:    No relevant prior studies available.     FINDINGS:    BRAIN:  No evidence of acute infarction.  No hemorrhage.    VENTRICLES:  Ventriculomegaly.    BONES/JOINTS:  Unremarkable.    SINUSES:  Unremarkable as visualized.  No acute sinusitis.    MASTOID AIR CELLS:  Unremarkable as visualized.  No mastoid effusion.    ORBITS:  Unremarkable as visualized.       Impression:      1.  Ventriculomegaly.  2.  No evidence of acute infarction.     This report was finalized on 1/4/2022 11:58 AM by Dr. Brayden Davis MD.       SCANNED - IMAGING [[id number]] Resulted: 01/01/22     Updated: 01/04/22 0031    XR Chest 1 View [[id number]] Collected: 01/03/22 1445     Updated: 01/03/22 1448    Narrative:      EXAM:    XR Chest, 1 View     EXAM DATE:    1/3/2022 2:19 PM     CLINICAL HISTORY:    dyspnea; E87.4-Axbz-ysigtksony and hyponatremia     TECHNIQUE:    Frontal view of the chest.     COMPARISON:    08/26/2015     FINDINGS:    LUNGS:  Vague bibasilar airspace disease.    PLEURAL SPACE:  Unremarkable.  No pneumothorax.    HEART:  Cardiomegaly.    MEDIASTINUM:  Unremarkable.    BONES/JOINTS:  Unremarkable.       Impression:      1.  Cardiomegaly.  2.  Vague bibasilar airspace disease.     This report was finalized on 1/3/2022 2:46 PM by Dr. Brayden Davis MD.           Assessment & Plan    86M Obese by BMI PMH CHF (details unknown), prior TAVR, diabetes mellitus, CKD, BPH, and hypertension who presents to the ED complaining of altered mental status and abdominal pain, found to have severe hyponatremia, witnessed aspiration with new pneumonia.    #Sepsis, Acute Metabolic Encephalopathy & Acute Hypoxic Respiratory Failure 2/2 PNA, Bacterial, treating for MDR Organisms/Aspiration  - Patient presented w/ confusion, HR > 90, RR > 20, WBC count > 12K, PNA on imaging.  -  "Admission labs showed WBC count 13K, CRP < 0.3 -> 0.66, lactate 1.5, Covid/flu negative, UA benign, Bcx's NGTD  - CTPE showed No PE, mild infiltrate and/or atelectasis in dependent portions posterior lung bases  - Pulm consulted; Recs pending  - SLP consulted; Following, no noted aspiration  - Continue Azactam and Flagyl, f/u cultures  - Continue APAP PRN for fevers, Duonebs PRN  - Monitor on telemetry, continue 02 but wean as able    #Electrolyte Abnormalities  - Borderline Hypomagnesemia - Mg 1.8 on admission, Replacing, on protocol  - Acute vs Chronic Hyponatremia - Na 114 on admission, Holding home HCTZ and Bumex, Nephrology consulted and following, on 1L fluid restriction, trending up 121-124 today    #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  - MRI showed ventriculomegaly, no evidence of infarct  - Consult TeleNeurology; Appreciate recs    #Irregular Spiculated Posterior L Costophrenic Angle Mass  - CT Chest showed small suspicious irregular mass posterior L costophrenic angle, measures 2.2cm, rec'd serial f/u 2-3m w/ Chest CT and if persists PET thereafter  - Consult Pulmonary as well for rec's and possibly to follow as outpatient.     #HTN/HLD/Non-obstructive CAD and chronic LBBB  #Chronic CHF w/ unknown EF  #Hx AS s/p TAVR  - Labs showed trops 0.013, proBNP 413  - EKG showed NSR, LBBB  - Echo pending  - CT showed mild cardiomegaly, prior CABG, prior TAVR  - Cards consulted; Recs pending  - Continue home , statin  - Holding home BB and ARB due to sepsis, resume as indicated  - Holding home Amlodipine, Hydralazine, Spironolactone due to sepsis, resume as indicated  - Holding home Bumex as per above, resume as indicated  - " Continue to monitor on tele, strict I/O's, trend HR and BP    #CKDIII  - Patient presented w/ Cr 1.59, b/l around 1.3-1.5; Today 1.38  - Trend Cr and UOP, avoid nephrotoxins, NSAIDs, dehydration and contrast as able.     #IDDM Type II, unknown control, unknown complications  - Hgb A1c = Pending  - No home oral agents, continue FSBG and SSI, holding home levemir for now, Glc controlled, resume as indicated.    #Hypothyroid  - Continue home Levothyroxine    #Chronic Pain  - Reviewed MARCUS, holding home gabapentin for now due to confusion    #Advanced Age/Age Related Debility  - Supportive Care, Consult PT/OT    #Obesity by BMI  - BMI 34, complicates all aspects of care     F: Oral  E: Monitor & Replace PRN  N: DM, Renal Diet  Ppx: SQH  Code: Full Code     Dispo: Pending workup and clinical improvement     *This patient is considered high risk due to sepsis, acute respiratory failure, PNA, acute encephalopathy, spiculated lung mass, ventriculomegaly, severe hyponatremia.    VTE Prophylaxis:   Mechanical Order History:     None      Pharmalogical Order History:      Ordered     Dose Route Frequency Stop    01/02/22 0931  heparin (porcine) 5000 UNIT/ML injection 5,000 Units         5,000 Units SC Every 12 Hours Scheduled --              Chago Alvarenga MD  [facility]  01/04/22  14:14 EST

## 2022-01-04 NOTE — PLAN OF CARE
Goal Outcome Evaluation:  Plan of Care Reviewed With: patient        Progress: no change  Outcome Summary: Pt currently lying quietly in bed watching television; daughter at bedside. Pt denies any complaints at this time. Sodium came up to 121 today; continues to receive IV abx. V/S stable with no signs of respiratory distress present. Will continue to monitor pt and follow plan of care.

## 2022-01-04 NOTE — NURSING NOTE
Spoke with Dr. Bacon via telephone per Dr. Alvarenga neurology consult. Explained that Dr. Alvarenga ordered consult for possible NPH, MRI showed ventriculomegaly; pt has been confused and had frequent dribbling. Dr. Bacon informed me that he would review pt's MRI scan and contact Dr. Alvarenga with his thoughts.

## 2022-01-04 NOTE — PROGRESS NOTES
Nephrology Progress Note      Subjective     Per nursing report, pt is more confused today, he was not able to tell me where he is, but said he feels fine, no chest pain, dizziness or shortness of breath    Objective       Vital signs :     Temp:  [98 °F (36.7 °C)-100.3 °F (37.9 °C)] 98.7 °F (37.1 °C)  Heart Rate:  [81-92] 83  Resp:  [18-24] 18  BP: (145-163)/(62-78) 163/78      Intake/Output Summary (Last 24 hours) at 1/4/2022 0956  Last data filed at 1/4/2022 0800  Gross per 24 hour   Intake 1340 ml   Output 400 ml   Net 940 ml       Physical Exam:    General Appearance : not in acute distress  Lungs : clear to auscultation, respirations regular  Heart :  regular rhythm & normal rate, normal S1, S2 and no murmur, no rub  Abdomen : normal bowel sounds, no masses, no hepatomegaly, no splenomegaly, soft non-tender and no guarding  Extremities : moves extremities well, no edema, no cyanosis and no redness  Neurologic :  Unable to assess., not fully cooperative.   Acess :       Laboratory Data :     Albumin Albumin   Date Value Ref Range Status   01/03/2022 3.69 3.50 - 5.20 g/dL Final   01/01/2022 4.26 3.50 - 5.20 g/dL Final      Magnesium Magnesium   Date Value Ref Range Status   01/01/2022 1.8 1.6 - 2.4 mg/dL Final          PTH               No results found for: PTH    CBC and coagulation:  Results from last 7 days   Lab Units 01/04/22  0803 01/03/22  0538 01/02/22  1310 01/02/22  1229 01/01/22  2231 01/01/22  2229 01/01/22  2229   LACTATE mmol/L  --   --   --   --  1.5  --   --    SED RATE mm/hr  --   --  11  --   --   --  26*   CRP mg/dL  --   --   --  0.66*  --   --  <0.30   WBC 10*3/mm3 12.26* 12.55* 13.22*  --   --    < > 13.06*   HEMOGLOBIN g/dL 9.6* 9.6* 10.3*  --   --    < > 10.8*   HEMATOCRIT % 29.7* 29.2* 31.0*  --   --    < > 32.3*   MCV fL 97.1* 96.4 93.9  --   --    < > 93.6   MCHC g/dL 32.3 32.9 33.2  --   --    < > 33.4   PLATELETS 10*3/mm3 156 164 148  --   --    < > 175   INR   --   --   --   --    --   --  0.92    < > = values in this interval not displayed.     Acid/base balance:  Results from last 7 days   Lab Units 01/01/22  2306   PH, ARTERIAL pH units 7.409   PO2 ART mm Hg 68.6*   PCO2, ARTERIAL mm Hg 31.8*   HCO3 ART mmol/L 20.0     Renal and electrolytes:  Results from last 7 days   Lab Units 01/04/22  0803 01/04/22  0414 01/03/22  2358 01/03/22  2005 01/03/22  1602 01/03/22  1216 01/03/22  0538 01/02/22  2334 01/02/22  1843 01/02/22  1229 01/02/22  1229 01/02/22  0838 01/02/22  0838 01/02/22  0135 01/01/22  2229   SODIUM mmol/L 124* 121* 121* 119* 117*   < > 121*  121*   < > 122*   < > 122*   < > 121*   < > 114*   POTASSIUM mmol/L 4.3  --   --   --   --   --  4.1  4.1  --  4.1   < > 4.2   < > 4.4   < > 3.8   MAGNESIUM mg/dL  --   --   --   --   --   --   --   --   --   --   --   --   --   --  1.8   CHLORIDE mmol/L 95*  --   --   --   --   --  92*  92*  --  89*   < > 90*   < > 87*   < > 82*   CO2 mmol/L 17.0*  --   --   --   --   --  19.6*  19.6*  --  21.0*   < > 22.7   < > 21.4*   < > 18.5*   BUN mg/dL 21  --   --   --   --   --  18  18  --  18   < > 20   < > 21   < > 24*   CREATININE mg/dL 1.38*  --   --   --   --   --  1.46*  1.46*  --  1.42*  --  1.50*  --  1.46*   < > 1.59*   EGFR IF NONAFRICN AM mL/min/1.73 49*  --   --   --   --   --  46*  46*  --  47*   < > 44*   < > 46*   < > 41*   CALCIUM mg/dL 9.0  --   --   --   --   --  9.0  9.0  --  9.2   < > 9.3   < > 9.4   < > 9.7    < > = values in this interval not displayed.     Estimated Creatinine Clearance: 42 mL/min (A) (by C-G formula based on SCr of 1.38 mg/dL (H)).    Liver and pancreatic function:  Results from last 7 days   Lab Units 01/03/22  0538 01/01/22  2315 01/01/22  2229   ALBUMIN g/dL 3.69  --  4.26   BILIRUBIN mg/dL 0.3  --  0.5   ALK PHOS U/L 67  --  89   AST (SGOT) U/L 49*  --  37   ALT (SGPT) U/L 23  --  22   AMMONIA umol/L  --  17  --          Cardiac:  Results from last 7 days   Lab Units 01/02/22  0135   PROBNP pg/mL  413.0     Liver and pancreatic function:  Results from last 7 days   Lab Units 01/03/22  0538 01/01/22  2315 01/01/22  2229   ALBUMIN g/dL 3.69  --  4.26   BILIRUBIN mg/dL 0.3  --  0.5   ALK PHOS U/L 67  --  89   AST (SGOT) U/L 49*  --  37   ALT (SGPT) U/L 23  --  22   AMMONIA umol/L  --  17  --        Medications :     aspirin EC, 325 mg, Oral, Daily  aztreonam, 2 g, Intravenous, Q8H  heparin (porcine), 5,000 Units, Subcutaneous, Q12H  insulin aspart, 0-7 Units, Subcutaneous, TID AC  levothyroxine, 75 mcg, Oral, Q AM  metroNIDAZOLE, 500 mg, Intravenous, Q8H  sodium chloride, 10 mL, Intravenous, Q12H      Pharmacy Consult,           Assessment/Plan     1.  Presumed chronic hyponatremia, hypoosmolar  2.  Metabolic encephalopathy  3.  Lung mass  4.  Essential Hypertension  5. 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 Type 2 diabetes    Pt received 3% saline yesterday due to sodium dropping less than 120. Now holding around 122. Will request Urine osm, and continue on fluid restriction. Agree with MRI for NPH and proceed as clinically indicated.     Etiology is mostly likely polydipsia and HCTZ, continue on fluid restriction  Target for next 24 hours will will 128-130, I have set parameters for fluid replacement.   If sodium is more than 130 start on D5W at 75ml/h and stop if sodium 130 or less  If sodium is less than 120, 3% saline 100ml over 2 hours  If sodium is between 120-130, no IVF just fluid restriction      Maryann Armas MD  01/04/22  09:56 EST

## 2022-01-05 ENCOUNTER — APPOINTMENT (OUTPATIENT)
Dept: GENERAL RADIOLOGY | Facility: HOSPITAL | Age: 87
End: 2022-01-05

## 2022-01-05 LAB
A-A DO2: 67 MMHG (ref 0–300)
ANION GAP SERPL CALCULATED.3IONS-SCNC: 9.5 MMOL/L (ref 5–15)
ARTERIAL PATENCY WRIST A: ABNORMAL
ATMOSPHERIC PRESS: 723 MMHG
BASE EXCESS BLDA CALC-SCNC: -1 MMOL/L (ref 0–2)
BASOPHILS # BLD AUTO: 0.03 10*3/MM3 (ref 0–0.2)
BASOPHILS NFR BLD AUTO: 0.3 % (ref 0–1.5)
BDY SITE: ABNORMAL
BODY TEMPERATURE: 0 C
BUN SERPL-MCNC: 24 MG/DL (ref 8–23)
BUN/CREAT SERPL: 17.9 (ref 7–25)
CALCIUM SPEC-SCNC: 8.6 MG/DL (ref 8.6–10.5)
CHLORIDE SERPL-SCNC: 96 MMOL/L (ref 98–107)
CO2 BLDA-SCNC: 24.3 MMOL/L (ref 22–33)
CO2 SERPL-SCNC: 20.5 MMOL/L (ref 22–29)
COHGB MFR BLD: 0.2 % (ref 0–5)
CREAT SERPL-MCNC: 1.34 MG/DL (ref 0.76–1.27)
CRP SERPL-MCNC: 10.66 MG/DL (ref 0–0.5)
DEPRECATED RDW RBC AUTO: 48.4 FL (ref 37–54)
EOSINOPHIL # BLD AUTO: 0.11 10*3/MM3 (ref 0–0.4)
EOSINOPHIL NFR BLD AUTO: 1.1 % (ref 0.3–6.2)
ERYTHROCYTE [DISTWIDTH] IN BLOOD BY AUTOMATED COUNT: 13.8 % (ref 12.3–15.4)
GAS FLOW AIRWAY: 2 LPM
GFR SERPL CREATININE-BSD FRML MDRD: 51 ML/MIN/1.73
GLUCOSE BLDC GLUCOMTR-MCNC: 114 MG/DL (ref 70–130)
GLUCOSE BLDC GLUCOMTR-MCNC: 136 MG/DL (ref 70–130)
GLUCOSE BLDC GLUCOMTR-MCNC: 179 MG/DL (ref 70–130)
GLUCOSE BLDC GLUCOMTR-MCNC: 227 MG/DL (ref 70–130)
GLUCOSE BLDC GLUCOMTR-MCNC: 235 MG/DL (ref 70–130)
GLUCOSE SERPL-MCNC: 127 MG/DL (ref 65–99)
HCO3 BLDA-SCNC: 23.2 MMOL/L (ref 20–26)
HCT VFR BLD AUTO: 25.2 % (ref 37.5–51)
HCT VFR BLD CALC: 26.6 % (ref 38–51)
HGB BLD-MCNC: 8.3 G/DL (ref 13–17.7)
HGB BLDA-MCNC: 8.7 G/DL (ref 14–18)
IMM GRANULOCYTES # BLD AUTO: 0.06 10*3/MM3 (ref 0–0.05)
IMM GRANULOCYTES NFR BLD AUTO: 0.6 % (ref 0–0.5)
INHALED O2 CONCENTRATION: 28 %
LYMPHOCYTES # BLD AUTO: 0.92 10*3/MM3 (ref 0.7–3.1)
LYMPHOCYTES NFR BLD AUTO: 9.1 % (ref 19.6–45.3)
Lab: ABNORMAL
MCH RBC QN AUTO: 31.4 PG (ref 26.6–33)
MCHC RBC AUTO-ENTMCNC: 32.9 G/DL (ref 31.5–35.7)
MCV RBC AUTO: 95.5 FL (ref 79–97)
METHGB BLD QL: 0 % (ref 0–3)
MODALITY: ABNORMAL
MONOCYTES # BLD AUTO: 1.15 10*3/MM3 (ref 0.1–0.9)
MONOCYTES NFR BLD AUTO: 11.3 % (ref 5–12)
NEUTROPHILS NFR BLD AUTO: 7.89 10*3/MM3 (ref 1.7–7)
NEUTROPHILS NFR BLD AUTO: 77.6 % (ref 42.7–76)
NOTE: ABNORMAL
NOTIFIED WHO: ABNORMAL
NRBC BLD AUTO-RTO: 0 /100 WBC (ref 0–0.2)
OSMOLALITY UR: 425 MOSM/KG
OXYHGB MFR BLDV: 95.6 % (ref 94–99)
PCO2 BLDA: 35.9 MM HG (ref 35–45)
PCO2 TEMP ADJ BLD: ABNORMAL MM[HG]
PH BLDA: 7.42 PH UNITS (ref 7.35–7.45)
PH, TEMP CORRECTED: ABNORMAL
PLATELET # BLD AUTO: 142 10*3/MM3 (ref 140–450)
PMV BLD AUTO: 10.3 FL (ref 6–12)
PO2 BLDA: 82.2 MM HG (ref 83–108)
PO2 TEMP ADJ BLD: ABNORMAL MM[HG]
POTASSIUM SERPL-SCNC: 4.3 MMOL/L (ref 3.5–5.2)
RBC # BLD AUTO: 2.64 10*6/MM3 (ref 4.14–5.8)
SAO2 % BLDCOA: 95.8 % (ref 94–99)
SODIUM SERPL-SCNC: 124 MMOL/L (ref 136–145)
SODIUM SERPL-SCNC: 124 MMOL/L (ref 136–145)
SODIUM SERPL-SCNC: 126 MMOL/L (ref 136–145)
SODIUM SERPL-SCNC: 126 MMOL/L (ref 136–145)
SODIUM SERPL-SCNC: 129 MMOL/L (ref 136–145)
SODIUM SERPL-SCNC: 130 MMOL/L (ref 136–145)
VENTILATOR MODE: ABNORMAL
WBC NRBC COR # BLD: 10.16 10*3/MM3 (ref 3.4–10.8)

## 2022-01-05 PROCEDURE — 94799 UNLISTED PULMONARY SVC/PX: CPT

## 2022-01-05 PROCEDURE — 99232 SBSQ HOSP IP/OBS MODERATE 35: CPT | Performed by: INTERNAL MEDICINE

## 2022-01-05 PROCEDURE — 82805 BLOOD GASES W/O2 SATURATION: CPT

## 2022-01-05 PROCEDURE — 63710000001 INSULIN ASPART PER 5 UNITS: Performed by: INTERNAL MEDICINE

## 2022-01-05 PROCEDURE — 82962 GLUCOSE BLOOD TEST: CPT

## 2022-01-05 PROCEDURE — 85025 COMPLETE CBC W/AUTO DIFF WBC: CPT | Performed by: INTERNAL MEDICINE

## 2022-01-05 PROCEDURE — 82375 ASSAY CARBOXYHB QUANT: CPT

## 2022-01-05 PROCEDURE — 80048 BASIC METABOLIC PNL TOTAL CA: CPT | Performed by: INTERNAL MEDICINE

## 2022-01-05 PROCEDURE — 36600 WITHDRAWAL OF ARTERIAL BLOOD: CPT

## 2022-01-05 PROCEDURE — 71045 X-RAY EXAM CHEST 1 VIEW: CPT | Performed by: RADIOLOGY

## 2022-01-05 PROCEDURE — 71045 X-RAY EXAM CHEST 1 VIEW: CPT

## 2022-01-05 PROCEDURE — 83050 HGB METHEMOGLOBIN QUAN: CPT

## 2022-01-05 PROCEDURE — 84295 ASSAY OF SERUM SODIUM: CPT | Performed by: INTERNAL MEDICINE

## 2022-01-05 PROCEDURE — 86140 C-REACTIVE PROTEIN: CPT | Performed by: INTERNAL MEDICINE

## 2022-01-05 PROCEDURE — 99222 1ST HOSP IP/OBS MODERATE 55: CPT | Performed by: STUDENT IN AN ORGANIZED HEALTH CARE EDUCATION/TRAINING PROGRAM

## 2022-01-05 PROCEDURE — 25010000002 HEPARIN (PORCINE) PER 1000 UNITS: Performed by: INTERNAL MEDICINE

## 2022-01-05 PROCEDURE — 99231 SBSQ HOSP IP/OBS SF/LOW 25: CPT | Performed by: INTERNAL MEDICINE

## 2022-01-05 PROCEDURE — 99233 SBSQ HOSP IP/OBS HIGH 50: CPT | Performed by: INTERNAL MEDICINE

## 2022-01-05 PROCEDURE — 25010000002 LORAZEPAM PER 2 MG: Performed by: PHYSICIAN ASSISTANT

## 2022-01-05 RX ORDER — ASPIRIN 81 MG/1
81 TABLET ORAL DAILY
Status: DISCONTINUED | OUTPATIENT
Start: 2022-01-06 | End: 2022-01-08 | Stop reason: HOSPADM

## 2022-01-05 RX ORDER — LORAZEPAM 2 MG/ML
0.5 INJECTION INTRAMUSCULAR ONCE
Status: COMPLETED | OUTPATIENT
Start: 2022-01-05 | End: 2022-01-05

## 2022-01-05 RX ORDER — SODIUM CHLORIDE 1000 MG
1 TABLET, SOLUBLE MISCELLANEOUS
Status: DISCONTINUED | OUTPATIENT
Start: 2022-01-05 | End: 2022-01-07

## 2022-01-05 RX ORDER — POLYETHYLENE GLYCOL 3350 17 G/17G
17 POWDER, FOR SOLUTION ORAL DAILY
Status: DISCONTINUED | OUTPATIENT
Start: 2022-01-05 | End: 2022-01-08 | Stop reason: HOSPADM

## 2022-01-05 RX ORDER — ROSUVASTATIN CALCIUM 10 MG/1
10 TABLET, COATED ORAL NIGHTLY
Status: DISCONTINUED | OUTPATIENT
Start: 2022-01-05 | End: 2022-01-08 | Stop reason: HOSPADM

## 2022-01-05 RX ORDER — AMOXICILLIN 250 MG
1 CAPSULE ORAL DAILY
Status: DISCONTINUED | OUTPATIENT
Start: 2022-01-05 | End: 2022-01-08 | Stop reason: HOSPADM

## 2022-01-05 RX ADMIN — METRONIDAZOLE 500 MG: 500 INJECTION, SOLUTION INTRAVENOUS at 09:29

## 2022-01-05 RX ADMIN — METRONIDAZOLE 500 MG: 500 INJECTION, SOLUTION INTRAVENOUS at 17:37

## 2022-01-05 RX ADMIN — SODIUM CHLORIDE, PRESERVATIVE FREE 10 ML: 5 INJECTION INTRAVENOUS at 09:30

## 2022-01-05 RX ADMIN — SODIUM CHLORIDE 2 G: 9 INJECTION, SOLUTION INTRAVENOUS at 03:52

## 2022-01-05 RX ADMIN — SODIUM CHLORIDE TAB 1 GM 1 G: 1 TAB at 14:23

## 2022-01-05 RX ADMIN — ASPIRIN 325 MG: 325 TABLET, COATED ORAL at 09:29

## 2022-01-05 RX ADMIN — SODIUM CHLORIDE 2 G: 9 INJECTION, SOLUTION INTRAVENOUS at 21:14

## 2022-01-05 RX ADMIN — HEPARIN SODIUM 5000 UNITS: 5000 INJECTION INTRAVENOUS; SUBCUTANEOUS at 09:29

## 2022-01-05 RX ADMIN — DOCUSATE SODIUM 50 MG AND SENNOSIDES 8.6 MG 1 TABLET: 8.6; 5 TABLET, FILM COATED ORAL at 14:23

## 2022-01-05 RX ADMIN — ROSUVASTATIN CALCIUM 10 MG: 10 TABLET, FILM COATED ORAL at 21:14

## 2022-01-05 RX ADMIN — SODIUM CHLORIDE TAB 1 GM 1 G: 1 TAB at 18:39

## 2022-01-05 RX ADMIN — SODIUM CHLORIDE, PRESERVATIVE FREE 10 ML: 5 INJECTION INTRAVENOUS at 21:14

## 2022-01-05 RX ADMIN — LEVOTHYROXINE SODIUM 75 MCG: 0.07 TABLET ORAL at 06:21

## 2022-01-05 RX ADMIN — LORAZEPAM 0.5 MG: 2 INJECTION, SOLUTION INTRAMUSCULAR; INTRAVENOUS at 04:59

## 2022-01-05 RX ADMIN — IPRATROPIUM BROMIDE AND ALBUTEROL SULFATE 3 ML: .5; 3 SOLUTION RESPIRATORY (INHALATION) at 11:49

## 2022-01-05 RX ADMIN — SODIUM CHLORIDE 2 G: 9 INJECTION, SOLUTION INTRAVENOUS at 13:07

## 2022-01-05 RX ADMIN — SODIUM CHLORIDE TAB 1 GM 1 G: 1 TAB at 09:39

## 2022-01-05 RX ADMIN — POLYETHYLENE GLYCOL 3350 17 G: 17 POWDER, FOR SOLUTION ORAL at 14:23

## 2022-01-05 NOTE — PROGRESS NOTES
Nephrology Progress Note      Subjective     Pt has mild respiratory distress, no shortness of breath  Objective       Vital signs :     Temp:  [97.6 °F (36.4 °C)-98.9 °F (37.2 °C)] 97.6 °F (36.4 °C)  Heart Rate:  [68-90] 90  Resp:  [18-22] 22  BP: (132-149)/(50-80) 140/80      Intake/Output Summary (Last 24 hours) at 1/5/2022 0800  Last data filed at 1/5/2022 0300  Gross per 24 hour   Intake 560 ml   Output 1850 ml   Net -1290 ml       Physical Exam:    General Appearance : not in acute distress  Lungs : clear to auscultation, respirations regular  Heart :  regular rhythm & normal rate, normal S1, S2 and no murmur, no rub  Abdomen : normal bowel sounds, no masses, no hepatomegaly, no splenomegaly, soft non-tender and no guarding  Extremities : moves extremities well, no edema, no cyanosis and no redness  Neurologic :  Unable to assess., not fully cooperative.   Acess :       Laboratory Data :     Albumin Albumin   Date Value Ref Range Status   01/03/2022 3.69 3.50 - 5.20 g/dL Final      Magnesium No results found for: MG       PTH               No results found for: PTH    CBC and coagulation:  Results from last 7 days   Lab Units 01/05/22  0031 01/04/22  0803 01/03/22  0538 01/02/22  1310 01/02/22  1310 01/02/22  1229 01/01/22  2231 01/01/22  2229 01/01/22  2229   LACTATE mmol/L  --   --   --   --   --   --  1.5  --   --    SED RATE mm/hr  --   --   --   --  11  --   --   --  26*   CRP mg/dL  --   --   --   --   --  0.66*  --   --  <0.30   WBC 10*3/mm3 10.16 12.26* 12.55*   < > 13.22*  --   --    < > 13.06*   HEMOGLOBIN g/dL 8.3* 9.6* 9.6*   < > 10.3*  --   --    < > 10.8*   HEMATOCRIT % 25.2* 29.7* 29.2*   < > 31.0*  --   --    < > 32.3*   MCV fL 95.5 97.1* 96.4   < > 93.9  --   --    < > 93.6   MCHC g/dL 32.9 32.3 32.9   < > 33.2  --   --    < > 33.4   PLATELETS 10*3/mm3 142 156 164   < > 148  --   --    < > 175   INR   --   --   --   --   --   --   --   --  0.92    < > = values in this interval not displayed.      Acid/base balance:  Results from last 7 days   Lab Units 01/01/22  2306   PH, ARTERIAL pH units 7.409   PO2 ART mm Hg 68.6*   PCO2, ARTERIAL mm Hg 31.8*   HCO3 ART mmol/L 20.0     Renal and electrolytes:  Results from last 7 days   Lab Units 01/05/22  0420 01/05/22  0031 01/04/22  2025 01/04/22  1650 01/04/22  1221 01/04/22  0803 01/04/22  0803 01/03/22  1216 01/03/22  0538 01/02/22  2334 01/02/22  1843 01/02/22  1229 01/02/22  1229 01/02/22  0135 01/01/22  2229   SODIUM mmol/L 124* 126* 123* 121* 121*   < > 124*   < > 121*  121*   < > 122*   < > 122*   < > 114*   POTASSIUM mmol/L  --  4.3  --   --   --   --  4.3  --  4.1  4.1   < > 4.1   < > 4.2   < > 3.8   MAGNESIUM mg/dL  --   --   --   --   --   --   --   --   --   --   --   --   --   --  1.8   CHLORIDE mmol/L  --  96*  --   --   --   --  95*  --  92*  92*   < > 89*   < > 90*   < > 82*   CO2 mmol/L  --  20.5*  --   --   --   --  17.0*  --  19.6*  19.6*   < > 21.0*   < > 22.7   < > 18.5*   BUN mg/dL  --  24*  --   --   --   --  21  --  18  18   < > 18   < > 20   < > 24*   CREATININE mg/dL  --  1.34*  --   --   --   --  1.38*  --  1.46*  1.46*  --  1.42*  --  1.50*   < > 1.59*   EGFR IF NONAFRICN AM mL/min/1.73  --  51*  --   --   --   --  49*  --  46*  46*   < > 47*   < > 44*   < > 41*   CALCIUM mg/dL  --  8.6  --   --   --   --  9.0  --  9.0  9.0   < > 9.2   < > 9.3   < > 9.7    < > = values in this interval not displayed.     Estimated Creatinine Clearance: 43 mL/min (A) (by C-G formula based on SCr of 1.34 mg/dL (H)).    Liver and pancreatic function:  Results from last 7 days   Lab Units 01/03/22  0538 01/01/22  2315 01/01/22  2229   ALBUMIN g/dL 3.69  --  4.26   BILIRUBIN mg/dL 0.3  --  0.5   ALK PHOS U/L 67  --  89   AST (SGOT) U/L 49*  --  37   ALT (SGPT) U/L 23  --  22   AMMONIA umol/L  --  17  --          Cardiac:  Results from last 7 days   Lab Units 01/02/22  0135   PROBNP pg/mL 413.0     Liver and pancreatic function:  Results from last 7  days   Lab Units 01/03/22  0538 01/01/22  2315 01/01/22  2229   ALBUMIN g/dL 3.69  --  4.26   BILIRUBIN mg/dL 0.3  --  0.5   ALK PHOS U/L 67  --  89   AST (SGOT) U/L 49*  --  37   ALT (SGPT) U/L 23  --  22   AMMONIA umol/L  --  17  --        Medications :     aspirin EC, 325 mg, Oral, Daily  aztreonam, 2 g, Intravenous, Q8H  heparin (porcine), 5,000 Units, Subcutaneous, Q12H  insulin aspart, 0-7 Units, Subcutaneous, TID AC  levothyroxine, 75 mcg, Oral, Q AM  metroNIDAZOLE, 500 mg, Intravenous, Q8H  sodium chloride, 10 mL, Intravenous, Q12H             Assessment/Plan     1.  Presumed chronic hyponatremia, hypoosmolar  2.  Metabolic encephalopathy  3.  Lung mass  4.  Essential Hypertension  5. 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 Type 2 diabetes    Sodium is relatively more stable around 124-126, did not require any 3% saline yesterday.   Reviewed Uosm and Romi, suggestive of SIADH, start on salt tab 1G TID and liberlize FR to 1.5L/day.     Etiology is mostly likely polydipsia and HCTZ, continue on fluid restriction  Target for next 24 hours will will 132-134, I have set parameters for fluid replacement.   If sodium is more than 135 start on D5W at 75ml/h and stop if sodium 130 or less  If sodium is less than 120, 3% saline 100ml over 2 hours  If sodium is between 120-134, no IVF just fluid restriction      Maryann Armas MD  01/05/22  08:00 EST

## 2022-01-05 NOTE — PROGRESS NOTES
LOS: 3 days     Name: Feroz Canales  Age/Sex: 86 y.o. male  :  1935        PCP: Provider, No Known  REF: No ref. provider found    Principal Problem:    Hyponatremia      Reason for follow-up: Congestive heart failure    Subjective       Subjective     Feroz Canales is a 86 year old male with a past medical history significant for congestive heart failure, diabetes mellitus type 2, chronic kidney disease, BPH, severe aortic stenosis status post TAVR, essential hypertension and coronary artery disease status post CABG. Patient presented to the ER with complaints of altered mental status and abdominal pain    Interval History: Patient is pleasantly confused. Reports some shortness of breath. Sodium improving. Records from [facility] shows patient was seen in 2020 and was noted to be stable from a cardiac standpoint at that time.           Vital Signs  Temp:  [97.6 °F (36.4 °C)-98.9 °F (37.2 °C)] 97.6 °F (36.4 °C)  Heart Rate:  [68-90] 90  Resp:  [-] 22  BP: (132-149)/(50-80) 140/80     Vital Signs (last 72 hrs)        0700   0659  0700   0659  0700   0659  0700   0843   Most Recent      Temp (°F) 97.2 -  99.4    98 -  100.3    97.6 -  98.9       97.6 (36.4)  0601    Heart Rate 71 -  82    81 -  92    68 -  90       90 / 0601    Resp 18 -  20    18 -  24    18 -  22       22  0601    /96 -  167/75    145/62 -  163/78    132/68 -  149/60       140/80 / 0601    SpO2 (%) 90 -  99    91 -  100    95 -  99       96  0601        Documented weights    22 2249 22 0002 22 0403 22 0500   Weight: 86.2 kg (190 lb) 92 kg (202 lb 14.4 oz) 91.8 kg (202 lb 6.4 oz) 97.3 kg (214 lb 8 oz)    22 0500   Weight: 96.4 kg (212 lb 8 oz)      Body mass index is 34.3 kg/m².    Intake/Output Summary (Last 24 hours) at 2022 0843  Last data filed at 2022 0300  Gross per 24 hour   Intake 560 ml   Output 1850 ml   Net  -1290 ml     Objective    Objective       Physical Exam:     General Appearance:    Alert, cooperative, in no acute distress   Head:    Normocephalic, without obvious abnormality, atraumatic   Eyes:            Conjunctivae and sclerae normal, no   icterus, no pallor, corneas clear.   Neck:   No adenopathy, supple, trachea midline, no thyromegaly, no   carotid bruit, no JVD   Lungs:     Clear to auscultation,respirations regular, even and                  unlabored    Heart:    Regular rhythm and normal rate, normal S1 and S2, no            murmur, no gallop, no rub, no click   Chest Wall:    No abnormalities observed   Abdomen:     Normal bowel sounds, no masses, no organomegaly, soft        non-tender, non-distended, no guarding, no rebound                tenderness   Extremities:   no edema, no cyanosis, no   redness   Pulses:   Pulses palpable and equal bilaterally   Skin:   No bleeding, bruising or rash       Neurologic:  Alert with confusion     Results review       Results Review:   Results from last 7 days   Lab Units 01/05/22  0031 01/04/22  0803 01/03/22  0538 01/02/22  1310 01/01/22  2229   WBC 10*3/mm3 10.16 12.26* 12.55* 13.22* 13.06*   HEMOGLOBIN g/dL 8.3* 9.6* 9.6* 10.3* 10.8*   PLATELETS 10*3/mm3 142 156 164 148 175     Results from last 7 days   Lab Units 01/05/22  0749 01/05/22  0420 01/05/22  0031 01/04/22  2025 01/04/22  1650 01/04/22  1221 01/04/22  0803 01/03/22  1216 01/03/22  0538 01/02/22  2334 01/02/22  1843 01/02/22  1229 01/02/22  1229 01/02/22  0838 01/02/22  0838 01/02/22  0606 01/02/22  0606 01/02/22  0135 01/01/22  2229   SODIUM mmol/L 124* 124* 126* 123* 121* 121* 124*   < > 121*  121*   < > 122*   < > 122*   < > 121*   < > 119*   < > 114*   POTASSIUM mmol/L  --   --  4.3  --   --   --  4.3  --  4.1  4.1  --  4.1  --  4.2  --  4.4  --  4.2   < > 3.8   CHLORIDE mmol/L  --   --  96*  --   --   --  95*  --  92*  92*  --  89*  --  90*  --  87*  --  86*   < > 82*   CO2 mmol/L  --   --   20.5*  --   --   --  17.0*  --  19.6*  19.6*  --  21.0*  --  22.7  --  21.4*  --  19.7*   < > 18.5*   BUN mg/dL  --   --  24*  --   --   --  21  --  18  18  --  18  --  20  --  21  --  22   < > 24*   CREATININE mg/dL  --   --  1.34*  --   --   --  1.38*  --  1.46*  1.46*  --  1.42*  --  1.50*  --  1.46*  --  1.50*   < > 1.59*   CALCIUM mg/dL  --   --  8.6  --   --   --  9.0  --  9.0  9.0  --  9.2  --  9.3  --  9.4  --  9.6   < > 9.7   GLUCOSE mg/dL  --   --  127*  --   --   --  122*  --  116*  116*  --  136*  --  120*  --  146*  --  157*   < > 164*   ALT (SGPT) U/L  --   --   --   --   --   --   --   --  23  --   --   --   --   --   --   --   --   --  22   AST (SGOT) U/L  --   --   --   --   --   --   --   --  49*  --   --   --   --   --   --   --   --   --  37    < > = values in this interval not displayed.     Results from last 7 days   Lab Units 01/01/22 2229   CK TOTAL U/L 345*   TROPONIN T ng/mL 0.013     Lab Results   Component Value Date    INR 0.92 01/01/2022     Lab Results   Component Value Date    MG 1.8 01/01/2022     Lab Results   Component Value Date    TSH 1.960 01/02/2022      Imaging Results (Last 48 Hours)     Procedure Component Value Units Date/Time    MRI Brain Without Contrast [[id number]] Collected: 01/04/22 1158     Updated: 01/04/22 1200    Narrative:      EXAM:    MR Head Without Intravenous Contrast     EXAM DATE:    1/4/2022 10:58 AM     CLINICAL HISTORY:    NPH?, family deferred LP; E87.4-Ffwu-ffibvcacxc and hyponatremia     TECHNIQUE:    Magnetic resonance images of the head/brain without intravenous  contrast in multiple planes.     COMPARISON:    No relevant prior studies available.     FINDINGS:    BRAIN:  No evidence of acute infarction.  No hemorrhage.    VENTRICLES:  Ventriculomegaly.    BONES/JOINTS:  Unremarkable.    SINUSES:  Unremarkable as visualized.  No acute sinusitis.    MASTOID AIR CELLS:  Unremarkable as visualized.  No mastoid effusion.    ORBITS:  Unremarkable as  visualized.       Impression:      1.  Ventriculomegaly.  2.  No evidence of acute infarction.     This report was finalized on 1/4/2022 11:58 AM by Dr. Brayden Davis MD.       SCANNED - IMAGING [[id number]] Resulted: 01/01/22     Updated: 01/04/22 0031    XR Chest 1 View [[id number]] Collected: 01/03/22 1445     Updated: 01/03/22 1448    Narrative:      EXAM:    XR Chest, 1 View     EXAM DATE:    1/3/2022 2:19 PM     CLINICAL HISTORY:    dyspnea; E87.0-Wcba-wvkozaqgjl and hyponatremia     TECHNIQUE:    Frontal view of the chest.     COMPARISON:    08/26/2015     FINDINGS:    LUNGS:  Vague bibasilar airspace disease.    PLEURAL SPACE:  Unremarkable.  No pneumothorax.    HEART:  Cardiomegaly.    MEDIASTINUM:  Unremarkable.    BONES/JOINTS:  Unremarkable.       Impression:      1.  Cardiomegaly.  2.  Vague bibasilar airspace disease.     This report was finalized on 1/3/2022 2:46 PM by Dr. Brayden Davis MD.           Lab Results   Component Value Date     (H) 08/26/2015      I reviewed the patient's new clinical results.    Telemetry: NSR 70-80 bpm      Medication Review:   aspirin EC, 325 mg, Oral, Daily  aztreonam, 2 g, Intravenous, Q8H  heparin (porcine), 5,000 Units, Subcutaneous, Q12H  insulin aspart, 0-7 Units, Subcutaneous, TID AC  levothyroxine, 75 mcg, Oral, Q AM  metroNIDAZOLE, 500 mg, Intravenous, Q8H  sodium chloride, 10 mL, Intravenous, Q12H  sodium chloride, 1 g, Oral, TID With Meals           Assessment      Assessment:  1. ASCVD, status post CABG in 2005, clinically stable.  2. Status post TAVR in 2017, clinically appears stable.  3. Severe hyponatremia of unclear etiology, seems to be improving  4. Possible aspiration pneumonia (bibasilar), being managed by the hospitalist service.  5. Progressive anemia with hematuria.    Plan     Recommendations:  1. Continue with aspirin but decrease the dose to 81 mg daily.  2. Patient does not seem to be having any anginal symptoms or signs or symptoms of  acute heart failure.  Hence no further cardiac work-up is indicated at this time.  3. Check fasting lipid panel and add a statin.       I discussed the patients findings and my recommendations with patient.      Electronically signed by NAHOMI Kelley, 01/05/22, 8:43 AM EST.    Electronically signed by Torres Huerta MD, 01/05/22, 3:02 PM EST.    Please note that portions of this note were completed with a voice recognition program.

## 2022-01-05 NOTE — PLAN OF CARE
Patient is pleasantly confused. Resting well in the bed throughout the night. No s.s of distress. VSS. No complaints. Will continue to monitor and follow care plan.

## 2022-01-05 NOTE — PROGRESS NOTES
Chief Complaint: Shortness of breath    Subjective   Sitting in chair with  daughter-in-law at bedside not in any respiratory distress. Which is finding  With patient's permission discussed with patient's daughter-in-law.        Review of Systems:   Positive for fatigue otherwise negative        Vital Signs  Temp:  [97.6 °F (36.4 °C)-98.7 °F (37.1 °C)] 98.3 °F (36.8 °C)  Heart Rate:  [74-90] 76  Resp:  [18-22] 20  BP: (132-155)/(50-80) 146/54  Body mass index is 34.3 kg/m².    Intake/Output Summary (Last 24 hours) at 1/5/2022 1800  Last data filed at 1/5/2022 1000  Gross per 24 hour   Intake 540 ml   Output 950 ml   Net -410 ml     I/O this shift:  In: 100 [P.O.:100]  Out: -     Physical Exam:  General-elderly, not in any acute distress    HEENT- pupils equally reactive to light, normal in size, no scleral icterus    Neck-supple    Respiratory-respirations normal-on auscultation no wheezing no crackles,     Cardiovascular-  Normal S1 and S2. No S3, S4 or murmurs. No JVD, no carotid bruit and no edema, pulses normal bilaterally     GI-nontender nondistended bowel sounds positive    CNS-nonfocal    Musculoskeletal -no edema  Extremities- no obvious deformity noticed     Psychiatric-mood good, good eye contact, alert awake oriented  Skin- no visible rash             Results Review:     I reviewed the patient's new clinical results.  Results from last 7 days   Lab Units 01/05/22  0031 01/04/22  0803 01/03/22  0538   WBC 10*3/mm3 10.16 12.26* 12.55*   HEMOGLOBIN g/dL 8.3* 9.6* 9.6*   PLATELETS 10*3/mm3 142 156 164     Results from last 7 days   Lab Units 01/05/22  1639 01/05/22  1356 01/05/22  0749 01/05/22  0420 01/05/22  0031 01/04/22  1221 01/04/22  0803 01/03/22  1216 01/03/22  0538 01/02/22  0135 01/01/22  2229   SODIUM mmol/L 126* 129* 124*   < > 126*   < > 124*   < > 121*  121*   < > 114*   POTASSIUM mmol/L  --   --   --   --  4.3  --  4.3  --  4.1  4.1   < > 3.8   CHLORIDE mmol/L  --   --   --   --  96*   --  95*  --  92*  92*   < > 82*   CO2 mmol/L  --   --   --   --  20.5*  --  17.0*  --  19.6*  19.6*   < > 18.5*   BUN mg/dL  --   --   --   --  24*  --  21  --  18  18   < > 24*   CREATININE mg/dL  --   --   --   --  1.34*  --  1.38*  --  1.46*  1.46*   < > 1.59*   CALCIUM mg/dL  --   --   --   --  8.6  --  9.0  --  9.0  9.0   < > 9.7   GLUCOSE mg/dL  --   --   --   --  127*  --  122*  --  116*  116*   < > 164*   MAGNESIUM mg/dL  --   --   --   --   --   --   --   --   --   --  1.8    < > = values in this interval not displayed.     Lab Results   Component Value Date    INR 0.92 01/01/2022    PROTIME 12.8 01/01/2022     Results from last 7 days   Lab Units 01/03/22  0538 01/01/22  2229   ALK PHOS U/L 67 89   BILIRUBIN mg/dL 0.3 0.5   ALT (SGPT) U/L 23 22   AST (SGOT) U/L 49* 37     Results from last 7 days   Lab Units 01/05/22  1417   PH, ARTERIAL pH units 7.420   PO2 ART mm Hg 82.2*   PCO2, ARTERIAL mm Hg 35.9   HCO3 ART mmol/L 23.2     Imaging Results (Last 24 Hours)     Procedure Component Value Units Date/Time    XR Chest 1 View [[id number]] Collected: 01/05/22 1230     Updated: 01/05/22 1233    Narrative:      EXAM:    XR Chest, 1 View     EXAM DATE:    1/5/2022 9:29 AM     CLINICAL HISTORY:    soa; E87.3-Eeao-zglvuptagq and hyponatremia     TECHNIQUE:    Frontal view of the chest.     COMPARISON:    01/03/2022     FINDINGS:    LUNGS:  Again is patchy bibasilar scattered airspace disease.    PLEURAL SPACE:  Probably tiny pleural effusions.  No pneumothorax.    HEART:  Heart size is stable.    MEDIASTINUM:  Unremarkable.    BONES/JOINTS:  Unremarkable.       Impression:      1.  Again is patchy bibasilar scattered airspace disease.  2.  Probably tiny pleural effusions.     This report was finalized on 1/5/2022 12:31 PM by Dr. Brayden Davis MD.                  [START ON 1/6/2022] aspirin, 81 mg, Oral, Daily  aztreonam, 2 g, Intravenous, Q8H  insulin aspart, 0-7 Units, Subcutaneous, TID AC  levothyroxine, 75  mcg, Oral, Q AM  metroNIDAZOLE, 500 mg, Intravenous, Q8H  polyethylene glycol, 17 g, Oral, Daily  rosuvastatin, 10 mg, Oral, Nightly  senna-docusate sodium, 1 tablet, Oral, Daily  sodium chloride, 10 mL, Intravenous, Q12H  sodium chloride, 1 g, Oral, TID With Meals           Medication Review:     Assessment/Plan     Patient Active Problem List   Diagnosis Code   • CKD (chronic kidney disease) stage 3, GFR 30-59 ml/min ([facility]) N18.30   • 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 Radiation dose reduction techniques included automated exposure control. Radiation audit for CT and nuclear cardiology exams in the last 12 months: 0.     FINDINGS:  Mild patchy infiltrates in both lung bases along with basilar atelectasis. The nondependent portions of both lungs are clear.     Prior transcatheter aortic valve replacement. CABG. Mild cardiomegaly. No pericardial effusion. Normal caliber thoracic aorta.     There is a suspicious irregular mass in the posterior left costophrenic angle measuring 2.2 x 2.0 cm. Malignancy is not excluded. Comparison with any available prior outside studies is recommended.     Small hiatal hernia with mild thoracic esophageal dilatation.     IMPRESSION:  1.  Mild infiltrate and/or atelectasis in the dependent posterior lung bases.  2.  Mild cardiomegaly. CABG. TAVR.  3.  Small suspicious irregular mass in the posterior left costophrenic angle. This measures up to 2.2 cm. Recommend short interval follow-up chest CT in 2-3 months. This persists, a follow-up PET/CT examination is recommended.        C       Repeat CT chest in 2 to 3 months. Follow-up with pulmonology on the outpatient basis.     Hyponatremia-improving continue management as per primary team and nephrology. .     Episode of aspiration-continue aspiration precautions     latest microbiology reviewed.  Continue current  antibiotics.      Microbiology Results (last 10 days)     Procedure Component Value - Date/Time    Blood Culture - Blood, Arm, Left [[id number]]  (Normal) Collected: 01/01/22 2327    Lab Status: Preliminary result Specimen: Blood from Arm, Left Updated: 01/04/22 2330     Blood Culture No growth at 3 days    Blood Culture - Blood, Arm, Right [[id number]]  (Normal) Collected: 01/01/22 2315    Lab Status: Preliminary result Specimen: Blood from Arm, Right Updated: 01/04/22 2330     Blood Culture No growth at 3 days    COVID-19 and FLU A/B PCR - Swab, Nasopharynx [[id number]]  (Normal) Collected: 01/01/22 2257    Lab Status: Final result Specimen: Swab from Nasopharynx Updated: 01/01/22 2340     COVID19 Not Detected     Influenza A PCR Not Detected     Influenza B PCR Not Detected    Narrative:      Fact sheet for providers: https://www.fda.gov/media/440372/download    Fact sheet for patients: https://www.fda.gov/media/012818/download    Test performed by PCR.          Cosme Stanley MD  01/05/22  18:00 EST

## 2022-01-05 NOTE — CASE MANAGEMENT/SOCIAL WORK
Discharge Planning Assessment   [facility]     Patient Name: Feroz Canales  MRN: [id number]  Today's Date: 1/5/2022    Admit Date: 1/1/2022       Discharge Plan     Row Name 01/05/22 1558       Plan    Plan Pt admitted on 1/1/22.  Pt lives at home alone with family residing below him in the same building.  Pt currently does not utilize home health services.  Pt family request home health at discharge.  Pt resides at [address] [place] [address].  Pt's family stated no preference of home health agency if ordered.  Pt currently utilizes hospital bed, cpap, shower chair, lift chair, bedside commode and rollator via [facility].  SS will follow.                JEREMIAH Gould

## 2022-01-05 NOTE — PLAN OF CARE
Goal Outcome Evaluation:   Pt. Has showed some improvement. He has been able to sit up in a chair for a while today. No current complaints.

## 2022-01-05 NOTE — CONSULTS
Stroke Consult Note    Patient Name: Feroz Canales   MRN: [id number]  Age: 86 y.o.  Sex: male  : 1935    Primary Care Physician: Provider, No Known  Referring Physician:  No ref. provider found      Chief Complaint/Reason for Consultation:  Evaluation of NPH    Subjective .  HPI:   This is 86 with history of hypertension, CKD stage III, type 2 diabetes, lung mass, chronic hyponatremia who presented with acute onset of mentation changes, urinary retention.  From the ED documentation it was reported that patient was unsteady ,vomited and had a suffered a fall. neurology was consulted for concern of ventriculomegaly/ normal pressure hydrocephalus.    At home, Ocassional  forgetfulness.   Lives in house with some one there all the time  Walk with a rollator- 2-3 years.      Review of Systems   UTO    Temp:  [97.6 °F (36.4 °C)-98.7 °F (37.1 °C)] 98.7 °F (37.1 °C)  Heart Rate:  [68-90] 78  Resp:  [18-22] 22  BP: (132-155)/(50-80) 155/57        NEURO( Exam is performed with help of hospital staff at bed side and observed by me via audio-video interface)    MENTAL STATUS: AAOx3, memory not  intact, fund of knowledge not  appropriate    LANG/SPEECH: Naming and repetition intact, fluent, follows 3-step commands    CRANIAL NERVES:    Pupils equal and reactive, EOMI intact, no gaze deviation, no nystagmus  No facial droop, cough and gag intact, shoulder shrug intact, tongue midline     MOTOR:  RUE 4/5, LUE 4-/5  RLE and LLE 4/5     SENSORY: Normal to light touch all throughout     COORDINATION: Normal finger to nose and heel to shin, no tremor, no dysmetria    STATION: Not assessed due to patient condition    GAIT: Not assessed due to patient condition    Acute Stroke Data    A    Past Medical History:   Diagnosis Date   • BPH with urinary obstruction    • CHF (congestive heart failure) (HCC)    • Chronic kidney disease    • Diabetes mellitus (HCC)    • Hypertension      Past Surgical History:   Procedure Laterality Date    • CARDIAC SURGERY     • CARDIAC VALVE REPLACEMENT       Family History   Problem Relation Age of Onset   • No Known Problems Father    • No Known Problems Mother      Social History     Socioeconomic History   • Marital status:    Tobacco Use   • Smoking status: Never Smoker   Substance and Sexual Activity   • Alcohol use: No   • Drug use: No     Allergies   Allergen Reactions   • Keflex [Cephalexin] Anaphylaxis and Hives   • Sulfa Antibiotics Hives and GI Intolerance     Prior to Admission medications    Medication Sig Start Date End Date Taking? Authorizing Provider   amLODIPine (NORVASC) 10 MG tablet Take 10 mg by mouth Every Night. 2/6/18  Yes Timo Ramirez MD   aspirin  MG tablet Take 325 mg by mouth Daily. 2/6/18  Yes Timo Ramirez MD   carvedilol (COREG) 6.25 MG tablet Take 6.25 mg by mouth 2 (Two) Times a Day With Meals. 2/6/18  Yes Timo Ramirez MD   cetirizine (zyrTEC) 10 MG tablet Take 10 mg by mouth Every Night.   Yes Timo Ramirez MD   ferrous sulfate 325 (65 FE) MG tablet Take 325 mg by mouth 2 (Two) Times a Day. 2/6/18  Yes Timo Ramirez MD   hydrALAZINE (APRESOLINE) 100 MG tablet Take 100 mg by mouth 3 (Three) Times a Day. 2/6/18  Yes Timo Ramirez MD   hydroCHLOROthiazide (HYDRODIURIL) 25 MG tablet Take 25 mg by mouth Daily.   Yes Timo Ramirez MD   insulin glargine (LANTUS, SEMGLEE) 100 UNIT/ML injection Inject 25 Units under the skin into the appropriate area as directed Daily.   Yes Timo Ramirez MD   levothyroxine (SYNTHROID, LEVOTHROID) 75 MCG tablet Take 75 mcg by mouth Daily. 2/6/18  Yes Timo Ramirez MD   losartan (COZAAR) 50 MG tablet Take 50 mg by mouth Every Night.   Yes Timo Ramirez MD   multivitamin (multivitamin) tablet tablet Take 1 tablet by mouth Daily.   Yes Timo Ramirez MD   multivitamin with minerals (PreserVision AREDS) tablet tablet Take 1 tablet by mouth 2 (Two) Times a  Day.   Yes Timo Ramirez MD   rosuvastatin (CRESTOR) 40 MG tablet Take 40 mg by mouth Daily. 2/6/18  Yes Timo Ramirez MD   spironolactone (ALDACTONE) 25 MG tablet Take 25 mg by mouth Daily. 2/6/18  Yes Timo Ramirez MD   terazosin (HYTRIN) 5 MG capsule Take 5 mg by mouth Every Night. 2/6/18  Yes Timo Ramirez MD   bumetanide (BUMEX) 1 MG tablet Take 1 mg by mouth Every Other Day As Needed (Weight gain over 4 pounds over baseline). 2/6/18   Timo Ramirez MD   colchicine 0.6 MG tablet Take 0.6 mg by mouth Daily As Needed for Muscle / Joint Pain.    Timo Ramirez MD   fluticasone (FLONASE) 50 MCG/ACT nasal spray 2 sprays into the nostril(s) as directed by provider Daily As Needed for Rhinitis.    Timo Ramirez MD   gabapentin (NEURONTIN) 600 MG tablet Take 600 mg by mouth Daily As Needed (Pain). Per daughter, states he takes this very rarely. 2/6/18   Timo Ramirez MD   HYDROcodone-acetaminophen (NORCO) 5-325 MG per tablet Take 1 tablet by mouth 2 (Two) Times a Day As Needed for Mild Pain . 2/6/18   Timo Ramirez MD   polyethylene glycol (MIRALAX) powder Take 17 g by mouth Daily As Needed (Constipation). 2/6/18   Timo Ramirez MD       [facility] Meds:  Scheduled- aspirin EC, 325 mg, Oral, Daily  aztreonam, 2 g, Intravenous, Q8H  heparin (porcine), 5,000 Units, Subcutaneous, Q12H  insulin aspart, 0-7 Units, Subcutaneous, TID AC  levothyroxine, 75 mcg, Oral, Q AM  metroNIDAZOLE, 500 mg, Intravenous, Q8H  sodium chloride, 10 mL, Intravenous, Q12H  sodium chloride, 1 g, Oral, TID With Meals      Infusions-     PRNs- dextrose  •  dextrose  •  glucagon (human recombinant)  •  hydrALAZINE  •  HYDROcodone-acetaminophen  •  ipratropium-albuterol  •  nitroglycerin  •  polyethylene glycol  •  [COMPLETED] Insert peripheral IV **AND** sodium chloride  •  sodium chloride        NIH Stroke Scale  Time: 11:04 EST  Person Administering Scale: Brian  MD Arleen      Results Reviewed:  I have personally reviewed current lab, radiology, and data and agree with results.              Assessment/Plan:  This is 86 with history of hypertension, CKD stage III, type 2 diabetes, lung mass, chronic hyponatremia who presented with acute onset of mentation changes, urinary retention. MRI showed no acute infarct, ventriculomegaly. Alcaraz ratio is > 0.39. There is diffuse brain atrophy as well.  On my exam, patient is close to his baseline as per the daughter. Spoke to the daughter at length patient cognition, gait issues. Urinary problems are likely related to prostrate disease. Recommend outpatient neurology evaluation by movement disorder specialist to evaluate for NPH and other neurodegenerative conditions.       Brian Bacon MD  January 5, 2022  11:04 EST    Verbal consent taken.  Patient agreeable to be seen via telemedicine.    This was an audio and video enabled telemedicine encounter.

## 2022-01-05 NOTE — PROGRESS NOTES
[facility] HOSPITALIST PROGRESS NOTE     Patient Identification:  Name:  Feroz Canales  Age:  86 y.o.  Sex:  male  :  1935  MRN:  [id number]  Visit Number:  [id number]  ROOM: [address] [place] [address]     Primary Care Provider:  Provider, No Known    Length of stay in inpatient status:  3    Subjective     Chief Compliant:    Chief Complaint   Patient presents with   • Altered Mental Status   • Vomiting     History of Presenting Illness:    Patient remains ill but stable, no acute events overnight, no new complaints, as morning went on patient put back on 02, increased WOB, repeat CXR w/ patchy infiltrates and tiny b/l effusions, cards following, home CHF meds held on admission, possibly developing some mild CHF, continued on Abx, check CRP, WBC count stable, having some hematuria in stephen, d/c'd SQH in favor of SCDs as Hgb downtrending today, Nephro following, Cr stable at 1.3, Na up some, salt tabs added, neuro evaluated recommending evaluation outpatient per movement disorder specialist, pulm rec'd outpatient imaging 2-3 months, patient denied any fevers or chills. Diet changed per daughters request.  Bowel regimen added today as well.   Objective     Current Hospital Meds:aspirin EC, 325 mg, Oral, Daily  aztreonam, 2 g, Intravenous, Q8H  insulin aspart, 0-7 Units, Subcutaneous, TID AC  levothyroxine, 75 mcg, Oral, Q AM  metroNIDAZOLE, 500 mg, Intravenous, Q8H  polyethylene glycol, 17 g, Oral, Daily  senna-docusate sodium, 1 tablet, Oral, Daily  sodium chloride, 10 mL, Intravenous, Q12H  sodium chloride, 1 g, Oral, TID With Meals         Current Antimicrobial Therapy:  Anti-Infectives (From admission, onward)    Ordered     Dose/Rate Route Frequency Start Stop    22 0323  metroNIDAZOLE (FLAGYL) 500 mg/100mL IVPB        Ordering Provider: Isabel Servin DO    500 mg Intravenous Every 8 Hours 22 0800 01/10/22 0759    22 1236  aztreonam (AZACTAM) 2 g in sodium chloride 0.9 % 100 mL  IVPB-VTB        Ordering Provider: Chago Alvarenga MD    2 g  over 4 Hours Intravenous Every 8 Hours 01/02/22 1921 01/09/22 1959    01/02/22 1236  aztreonam (AZACTAM) 2 g in sodium chloride 0.9 % 100 mL IVPB-VTB        Ordering Provider: Nikkie Escamilla PA    2 g  200 mL/hr over 30 Minutes Intravenous Once 01/02/22 1238 01/02/22 1326    01/02/22 0953  vancomycin 1750 mg/500 mL 0.9% NS IVPB ([facility])        Ordering Provider: Seth Nagy MD    20 mg/kg × 86.2 kg  over 2 Hours Intravenous Once 01/02/22 0955 01/02/22 1205    01/02/22 0306  clindamycin (CLEOCIN) 600 mg in dextrose 5% 50 mL IVPB (premix)        Ordering Provider: Avinash Centeno DO    600 mg Intravenous Once 01/02/22 0308 01/02/22 0537        Current Diuretic Therapy:  Diuretics (From admission, onward)    None        ----------------------------------------------------------------------------------------------------------------------  Vital Signs:  Temp:  [97.6 °F (36.4 °C)-98.7 °F (37.1 °C)] 98.7 °F (37.1 °C)  Heart Rate:  [68-90] 78  Resp:  [18-22] 22  BP: (132-155)/(50-80) 155/57  SpO2:  [95 %-99 %] 99 %  on  Flow (L/min):  [2] 2;   Device (Oxygen Therapy): nasal cannula  Body mass index is 34.3 kg/m².    Wt Readings from Last 3 Encounters:   01/05/22 96.4 kg (212 lb 8 oz)     Intake & Output (last 3 days)       01/02 0701 01/03 0700 01/03 0701 01/04 0700 01/04 0701 01/05 0700 01/05 0701 01/06 0700    P.O. 100 2300 680 100    IV Piggyback 700       Total Intake(mL/kg) 800 (8.7) 2300 (23.6) 680 (7.1) 100 (1)    Urine (mL/kg/hr) 700 (0.3) 400 (0.2) 1850 (0.8)     Stool   0     Total Output      Net +100 +1900 -1170 +100            Stool Unmeasured Occurrence   0 x         Diet Soft Texture; Chopped; Daily Fluid Restriction, Consistent Carbohydrate, Renal; 1500 mL Fluid Per Day  ----------------------------------------------------------------------------------------------------------------------  Physical  exam:  Constitutional:  Elderly, No acute distress.      HENT:  Head:  Normocephalic and atraumatic.  Mouth:  Moist mucous membranes.    Eyes:  Conjunctivae and EOM are normal. No scleral icterus.    Neck:  Neck supple.  No JVD present.    Cardiovascular:  Normal rate, regular rhythm and normal heart sounds with no murmur.  Pulmonary/Chest:  No respiratory distress, no wheezes, back on 2LNC, decreased bibasilar BS  Abdominal:  Soft. No distension and no tenderness.   Musculoskeletal:  No tenderness, and no deformity.     Neurological:  Alert and oriented to person, place.  No cranial nerve deficit.    Skin:  Skin is warm and dry. No rash noted. No pallor.   Peripheral vascular:  No clubbing, no cyanosis, no edema.  ----------------------------------------------------------------------------------------------------------------------  Results from last 7 days   Lab Units 01/05/22  0031 01/04/22  0803 01/03/22  0538 01/02/22  1310 01/02/22  1229 01/01/22  2231 01/01/22  2229   CRP mg/dL  --   --   --   --  0.66*  --  <0.30   LACTATE mmol/L  --   --   --   --   --  1.5  --    WBC 10*3/mm3 10.16 12.26* 12.55*   < >  --   --  13.06*   HEMOGLOBIN g/dL 8.3* 9.6* 9.6*   < >  --   --  10.8*   HEMATOCRIT % 25.2* 29.7* 29.2*   < >  --   --  32.3*   MCV fL 95.5 97.1* 96.4   < >  --   --  93.6   MCHC g/dL 32.9 32.3 32.9   < >  --   --  33.4   PLATELETS 10*3/mm3 142 156 164   < >  --   --  175   INR   --   --   --   --   --   --  0.92    < > = values in this interval not displayed.     Results from last 7 days   Lab Units 01/01/22  2306   PH, ARTERIAL pH units 7.409   PO2 ART mm Hg 68.6*   PCO2, ARTERIAL mm Hg 31.8*   HCO3 ART mmol/L 20.0     Results from last 7 days   Lab Units 01/05/22  0749 01/05/22  0420 01/05/22  0031 01/04/22  1221 01/04/22  0803 01/03/22  1216 01/03/22  0538 01/02/22  0135 01/01/22  2229   SODIUM mmol/L 124* 124* 126*   < > 124*   < > 121*  121*   < > 114*   POTASSIUM mmol/L  --   --  4.3  --  4.3  --  4.1   4.1   < > 3.8   MAGNESIUM mg/dL  --   --   --   --   --   --   --   --  1.8   CHLORIDE mmol/L  --   --  96*  --  95*  --  92*  92*   < > 82*   CO2 mmol/L  --   --  20.5*  --  17.0*  --  19.6*  19.6*   < > 18.5*   BUN mg/dL  --   --  24*  --  21  --  18  18   < > 24*   CREATININE mg/dL  --   --  1.34*  --  1.38*  --  1.46*  1.46*   < > 1.59*   EGFR IF NONAFRICN AM mL/min/1.73  --   --  51*  --  49*  --  46*  46*   < > 41*   CALCIUM mg/dL  --   --  8.6  --  9.0  --  9.0  9.0   < > 9.7   GLUCOSE mg/dL  --   --  127*  --  122*  --  116*  116*   < > 164*   ALBUMIN g/dL  --   --   --   --   --   --  3.69  --  4.26   BILIRUBIN mg/dL  --   --   --   --   --   --  0.3  --  0.5   ALK PHOS U/L  --   --   --   --   --   --  67  --  89   AST (SGOT) U/L  --   --   --   --   --   --  49*  --  37   ALT (SGPT) U/L  --   --   --   --   --   --  23  --  22    < > = values in this interval not displayed.   Estimated Creatinine Clearance: 43 mL/min (A) (by C-G formula based on SCr of 1.34 mg/dL (H)).  No results found for: AMMONIA  Results from last 7 days   Lab Units 01/01/22  2229   CK TOTAL U/L 345*   TROPONIN T ng/mL 0.013     Results from last 7 days   Lab Units 01/02/22  0135   PROBNP pg/mL 413.0         Hemoglobin A1C   Date/Time Value Ref Range Status   01/04/2022 0803 5.90 (H) 4.80 - 5.60 % Final     Glucose   Date/Time Value Ref Range Status   01/05/2022 1008 235 (H) 70 - 130 mg/dL Final     Comment:     Meter: [id number] : [id number] BLESSING GERARD   01/05/2022 0603 114 70 - 130 mg/dL Final     Comment:     Meter: [id number] : [id number] BLESSING GERARD   01/04/2022 1909 142 (H) 70 - 130 mg/dL Final     Comment:     Meter: [id number] : [id number] ANKUSH QUAN   01/04/2022 1644 133 (H) 70 - 130 mg/dL Final     Comment:     Meter: [id number] : [id number] ANN AMBRIZ   01/04/2022 1023 129 70 - 130 mg/dL Final     Comment:     Meter: [id number] : [id number] ANN AMBRIZ   01/04/2022 0644 125 70 -  130 mg/dL Final     Comment:     Meter: [id number] : [id number] ANN AMBRIZ   01/03/2022 1655 139 (H) 70 - 130 mg/dL Final     Comment:     Meter: [id number] : [id number] JOSE FISCHER   01/03/2022 1432 195 (H) 70 - 130 mg/dL Final     Comment:     Meter: [id number] : [id number] JOSE FISCHER     Lab Results   Component Value Date    TSH 1.960 01/02/2022     No results found for: PREGTESTUR, PREGSERUM, HCG, HCGQUANT  Pain Management Panel     Pain Management Panel Latest Ref Rng & Units 1/2/2022 6/25/2020    CREATININE UR mg/dL - 56.2    AMPHETAMINES SCREEN, URINE Negative Negative -    BARBITURATES SCREEN Negative Negative -    BENZODIAZEPINE SCREEN, URINE Negative Negative -    BUPRENORPHINEUR Negative Negative -    COCAINE SCREEN, URINE Negative Negative -    METHADONE SCREEN, URINE Negative Negative -    METHAMPHETAMINEUR Negative Negative -        Brief Urine Lab Results  (Last result in the past 365 days)      Color   Clarity   Blood   Leuk Est   Nitrite   Protein   CREAT   Urine HCG        01/02/22 0029 Yellow   Clear   Negative   Negative   Negative   100 mg/dL (2+)               Blood Culture   Date Value Ref Range Status   01/01/2022 No growth at 3 days  Preliminary   01/01/2022 No growth at 3 days  Preliminary     No results found for: URINECX  No results found for: WOUNDCX  No results found for: STOOLCX  No results found for: RESPCX  No results found for: AFBCX  Results from last 7 days   Lab Units 01/02/22  1310 01/02/22  1229 01/01/22  2231 01/01/22  2229   LACTATE mmol/L  --   --  1.5  --    SED RATE mm/hr 11  --   --  26*   CRP mg/dL  --  0.66*  --  <0.30     I have personally looked at the labs and they are summarized above.  ----------------------------------------------------------------------------------------------------------------------  Detailed radiology reports for the last 24 hours:  Imaging Results (Last 24 Hours)     Procedure Component Value Units Date/Time    XR Chest 1 View  [[id number]] Collected: 01/05/22 1230     Updated: 01/05/22 1233    Narrative:      EXAM:    XR Chest, 1 View     EXAM DATE:    1/5/2022 9:29 AM     CLINICAL HISTORY:    soa; E87.6-[id number]-iplmbsuxus and hyponatremia     TECHNIQUE:    Frontal view of the chest.     COMPARISON:    01/03/2022     FINDINGS:    LUNGS:  Again is patchy bibasilar scattered airspace disease.    PLEURAL SPACE:  Probably tiny pleural effusions.  No pneumothorax.    HEART:  Heart size is stable.    MEDIASTINUM:  Unremarkable.    BONES/JOINTS:  Unremarkable.       Impression:      1.  Again is patchy bibasilar scattered airspace disease.  2.  Probably tiny pleural effusions.     This report was finalized on 1/5/2022 12:31 PM by Dr. Brayden Davis MD.           Assessment & Plan    86M Obese by BMI PMH CHF (details unknown), prior TAVR, diabetes mellitus, CKD, BPH, and hypertension who presents to the ED complaining of altered mental status and abdominal pain, found to have severe hyponatremia, witnessed aspiration with new pneumonia.    #Sepsis, Acute Metabolic Encephalopathy & Acute Hypoxic Respiratory Failure 2/2 PNA, Bacterial, treating for MDR Organisms/Aspiration  - Patient presented w/ confusion, HR > 90, RR > 20, WBC count > 12K, PNA on imaging.  - Admission labs showed WBC count 13K, CRP < 0.3 -> 0.66, lactate 1.5, Covid/flu negative, UA benign, Bcx's NGTD  - CTPE showed No PE, mild infiltrate and/or atelectasis in dependent portions posterior lung bases  - SLP consulted; Following, no noted aspiration  - Continue Azactam and Flagyl x 7 days  - Continue APAP PRN for fevers, Duonebs PRN  - Today's CXR reviewed and no new consolidation, persisting patchy infiltrates  - Monitor on telemetry, continue 02 but wean as able    #Electrolyte Abnormalities  - Borderline Hypomagnesemia - Mg 1.8 on admission, Replaced per protocol  - Acute vs Chronic Hyponatremia - Na 114 on admission, Holding home HCTZ and Bumex, Nephrology consulted and following,  "suspect SIADH, added salt tabs, on 1.5L fluid restriction, trending up 124-126 today    #HTN/HLD/Non-obstructive CAD and chronic LBBB  #Chronic CHF w/ unknown EF  #Hx AS s/p TAVR  - Labs showed trops 0.013, proBNP 413  - EKG showed NSR, LBBB  - Echo pending  - CT showed mild cardiomegaly, prior CABG, prior TAVR  - Cards consulted; Following  - Continue home , statin  - Holding home BB and ARB due to sepsis, resume as indicated  - Holding home Amlodipine, Hydralazine, Spironolactone due to sepsis, resume as indicated  - Holding home Bumex as per above, resume as indicated  - CXR w/ b/l small effusions, f/u updated cards recs regarding resuming home meds and diuresis.   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  - MRI showed ventriculomegaly, no evidence of infarct  - Consult TeleNeurology; Rec'd outpatient evaluation by movement disorder specialist and will refer upon discharge    #Irregular Spiculated Posterior L Costophrenic Angle Mass  - CT Chest showed small suspicious irregular mass posterior L costophrenic angle, measures 2.2cm, rec'd serial f/u 2-3m w/ Chest CT and if persists PET thereafter  - Pulm consulted; Rec'd repeat CT Chest 2-3 months, consider PET at that time, f/u outpatient pulm closer to home    #CKDIII  - Patient presented w/ Cr 1.59, b/l around 1.3-1.5; Today 1.34  - Trend Cr and UOP, avoid nephrotoxins, NSAIDs, dehydration and contrast as able.     #IDDM Type II, controlled, unknown complications  - Hgb A1c = 5.9%  - No home oral agents, continue FSBG and SSI, holding home levemir for now, resume as indicated.    #Hematuria - New  - Noted redish " urine in stephen 1/4, worse today, Hgb down to 8.3 today, d/c'd SQH and added SCDs for now    #Hypothyroid  - Continue home Levothyroxine    #Chronic Pain  - Reviewed MARCUS, holding home gabapentin for now due to confusion    #Advanced Age/Age Related Debility  - Supportive Care, Consult PT/OT    #Obesity by BMI  - BMI 34, complicates all aspects of care     F: Oral  E: Monitor & Replace PRN  N: Soft Texture per daughter request  Ppx: SCDs  Code: Full Code     Dispo: Pending workup and clinical improvement     *This patient is considered high risk due to sepsis, acute respiratory failure, PNA, acute encephalopathy, spiculated lung mass, ventriculomegaly, severe hyponatremia.    VTE Prophylaxis:   Mechanical Order History:      Ordered        01/05/22 1250  Place Sequential Compression Device  Once            01/05/22 1250  Maintain Sequential Compression Device  Continuous                    Pharmalogical Order History:      Ordered     Dose Route Frequency Stop    01/02/22 2352  heparin (porcine) 5000 UNIT/ML injection 5,000 Units  Status:  Discontinued         5,000 Units SC Every 12 Hours Scheduled 01/05/22 1250              Chago Alvarenga MD  [facility] Hospitalist  01/05/22  12:55 EST

## 2022-01-06 ENCOUNTER — APPOINTMENT (OUTPATIENT)
Dept: CARDIOLOGY | Facility: HOSPITAL | Age: 87
End: 2022-01-06

## 2022-01-06 LAB
ANION GAP SERPL CALCULATED.3IONS-SCNC: 8.8 MMOL/L (ref 5–15)
BACTERIA SPEC AEROBE CULT: NORMAL
BACTERIA SPEC AEROBE CULT: NORMAL
BASOPHILS # BLD AUTO: 0.07 10*3/MM3 (ref 0–0.2)
BASOPHILS NFR BLD AUTO: 0.9 % (ref 0–1.5)
BUN SERPL-MCNC: 27 MG/DL (ref 8–23)
BUN/CREAT SERPL: 18.5 (ref 7–25)
CALCIUM SPEC-SCNC: 8.9 MG/DL (ref 8.6–10.5)
CHLORIDE SERPL-SCNC: 100 MMOL/L (ref 98–107)
CHOLEST SERPL-MCNC: 90 MG/DL (ref 0–200)
CO2 SERPL-SCNC: 22.2 MMOL/L (ref 22–29)
CREAT SERPL-MCNC: 1.46 MG/DL (ref 0.76–1.27)
CRP SERPL-MCNC: 6.33 MG/DL (ref 0–0.5)
DEPRECATED RDW RBC AUTO: 49.9 FL (ref 37–54)
EOSINOPHIL # BLD AUTO: 0.37 10*3/MM3 (ref 0–0.4)
EOSINOPHIL NFR BLD AUTO: 4.5 % (ref 0.3–6.2)
ERYTHROCYTE [DISTWIDTH] IN BLOOD BY AUTOMATED COUNT: 13.9 % (ref 12.3–15.4)
GFR SERPL CREATININE-BSD FRML MDRD: 46 ML/MIN/1.73
GLUCOSE BLDC GLUCOMTR-MCNC: 118 MG/DL (ref 70–130)
GLUCOSE BLDC GLUCOMTR-MCNC: 152 MG/DL (ref 70–130)
GLUCOSE BLDC GLUCOMTR-MCNC: 155 MG/DL (ref 70–130)
GLUCOSE BLDC GLUCOMTR-MCNC: 97 MG/DL (ref 70–130)
GLUCOSE SERPL-MCNC: 100 MG/DL (ref 65–99)
HCT VFR BLD AUTO: 24.4 % (ref 37.5–51)
HDLC SERPL-MCNC: 40 MG/DL (ref 40–60)
HGB BLD-MCNC: 7.9 G/DL (ref 13–17.7)
IMM GRANULOCYTES # BLD AUTO: 0.05 10*3/MM3 (ref 0–0.05)
IMM GRANULOCYTES NFR BLD AUTO: 0.6 % (ref 0–0.5)
LDLC SERPL CALC-MCNC: 31 MG/DL (ref 0–100)
LDLC/HDLC SERPL: 0.75 {RATIO}
LYMPHOCYTES # BLD AUTO: 1.12 10*3/MM3 (ref 0.7–3.1)
LYMPHOCYTES NFR BLD AUTO: 13.7 % (ref 19.6–45.3)
MCH RBC QN AUTO: 31.5 PG (ref 26.6–33)
MCHC RBC AUTO-ENTMCNC: 32.4 G/DL (ref 31.5–35.7)
MCV RBC AUTO: 97.2 FL (ref 79–97)
MONOCYTES # BLD AUTO: 1.07 10*3/MM3 (ref 0.1–0.9)
MONOCYTES NFR BLD AUTO: 13.1 % (ref 5–12)
NEUTROPHILS NFR BLD AUTO: 5.51 10*3/MM3 (ref 1.7–7)
NEUTROPHILS NFR BLD AUTO: 67.2 % (ref 42.7–76)
NRBC BLD AUTO-RTO: 0 /100 WBC (ref 0–0.2)
PLATELET # BLD AUTO: 177 10*3/MM3 (ref 140–450)
PMV BLD AUTO: 10.4 FL (ref 6–12)
POTASSIUM SERPL-SCNC: 3.9 MMOL/L (ref 3.5–5.2)
RBC # BLD AUTO: 2.51 10*6/MM3 (ref 4.14–5.8)
SODIUM SERPL-SCNC: 131 MMOL/L (ref 136–145)
SODIUM SERPL-SCNC: 134 MMOL/L (ref 136–145)
TRIGL SERPL-MCNC: 101 MG/DL (ref 0–150)
VLDLC SERPL-MCNC: 19 MG/DL (ref 5–40)
WBC NRBC COR # BLD: 8.19 10*3/MM3 (ref 3.4–10.8)

## 2022-01-06 PROCEDURE — 99232 SBSQ HOSP IP/OBS MODERATE 35: CPT | Performed by: INTERNAL MEDICINE

## 2022-01-06 PROCEDURE — 86140 C-REACTIVE PROTEIN: CPT | Performed by: INTERNAL MEDICINE

## 2022-01-06 PROCEDURE — 84295 ASSAY OF SERUM SODIUM: CPT | Performed by: INTERNAL MEDICINE

## 2022-01-06 PROCEDURE — 93306 TTE W/DOPPLER COMPLETE: CPT | Performed by: SPECIALIST

## 2022-01-06 PROCEDURE — 80061 LIPID PANEL: CPT | Performed by: INTERNAL MEDICINE

## 2022-01-06 PROCEDURE — 94799 UNLISTED PULMONARY SVC/PX: CPT

## 2022-01-06 PROCEDURE — 93306 TTE W/DOPPLER COMPLETE: CPT

## 2022-01-06 PROCEDURE — 80048 BASIC METABOLIC PNL TOTAL CA: CPT | Performed by: INTERNAL MEDICINE

## 2022-01-06 PROCEDURE — 85025 COMPLETE CBC W/AUTO DIFF WBC: CPT | Performed by: INTERNAL MEDICINE

## 2022-01-06 PROCEDURE — 82962 GLUCOSE BLOOD TEST: CPT

## 2022-01-06 RX ORDER — CARVEDILOL 6.25 MG/1
6.25 TABLET ORAL 2 TIMES DAILY WITH MEALS
Status: DISCONTINUED | OUTPATIENT
Start: 2022-01-06 | End: 2022-01-08 | Stop reason: HOSPADM

## 2022-01-06 RX ADMIN — SODIUM CHLORIDE 2 G: 9 INJECTION, SOLUTION INTRAVENOUS at 19:57

## 2022-01-06 RX ADMIN — SODIUM CHLORIDE TAB 1 GM 1 G: 1 TAB at 12:12

## 2022-01-06 RX ADMIN — LEVOTHYROXINE SODIUM 75 MCG: 0.07 TABLET ORAL at 06:22

## 2022-01-06 RX ADMIN — ASPIRIN 81 MG: 81 TABLET, COATED ORAL at 08:22

## 2022-01-06 RX ADMIN — IPRATROPIUM BROMIDE AND ALBUTEROL SULFATE 3 ML: .5; 3 SOLUTION RESPIRATORY (INHALATION) at 06:29

## 2022-01-06 RX ADMIN — SODIUM CHLORIDE TAB 1 GM 1 G: 1 TAB at 17:16

## 2022-01-06 RX ADMIN — SODIUM CHLORIDE 2 G: 9 INJECTION, SOLUTION INTRAVENOUS at 12:12

## 2022-01-06 RX ADMIN — SODIUM CHLORIDE TAB 1 GM 1 G: 1 TAB at 08:22

## 2022-01-06 RX ADMIN — ROSUVASTATIN CALCIUM 10 MG: 10 TABLET, FILM COATED ORAL at 19:57

## 2022-01-06 RX ADMIN — SODIUM CHLORIDE, PRESERVATIVE FREE 10 ML: 5 INJECTION INTRAVENOUS at 19:57

## 2022-01-06 RX ADMIN — METRONIDAZOLE 500 MG: 500 INJECTION, SOLUTION INTRAVENOUS at 16:06

## 2022-01-06 RX ADMIN — HYDROCODONE BITARTRATE AND ACETAMINOPHEN 1 TABLET: 5; 325 TABLET ORAL at 00:14

## 2022-01-06 RX ADMIN — POLYETHYLENE GLYCOL 3350 17 G: 17 POWDER, FOR SOLUTION ORAL at 08:22

## 2022-01-06 RX ADMIN — SODIUM CHLORIDE, PRESERVATIVE FREE 10 ML: 5 INJECTION INTRAVENOUS at 08:22

## 2022-01-06 RX ADMIN — CARVEDILOL 6.25 MG: 6.25 TABLET, FILM COATED ORAL at 17:16

## 2022-01-06 RX ADMIN — METRONIDAZOLE 500 MG: 500 INJECTION, SOLUTION INTRAVENOUS at 00:14

## 2022-01-06 RX ADMIN — METRONIDAZOLE 500 MG: 500 INJECTION, SOLUTION INTRAVENOUS at 08:23

## 2022-01-06 RX ADMIN — DOCUSATE SODIUM 50 MG AND SENNOSIDES 8.6 MG 1 TABLET: 8.6; 5 TABLET, FILM COATED ORAL at 08:22

## 2022-01-06 RX ADMIN — HYDROCODONE BITARTRATE AND ACETAMINOPHEN 1 TABLET: 5; 325 TABLET ORAL at 12:12

## 2022-01-06 RX ADMIN — SODIUM CHLORIDE 2 G: 9 INJECTION, SOLUTION INTRAVENOUS at 04:50

## 2022-01-06 NOTE — PROGRESS NOTES
LOS: 4 days     Name: Feroz Canales  Age/Sex: 86 y.o. male  :  1935        PCP: Provider, No Known  REF: No ref. provider found    Principal Problem:    Hyponatremia      Reason for follow-up: Congestive heart failure    Subjective       Subjective     Feroz Canales is a 86 year old male with a past medical history significant for congestive heart failure, diabetes mellitus type 2, chronic kidney disease, BPH, severe aortic stenosis status post TAVR, essential hypertension and coronary artery disease status post CABG. Patient presented to the ER with complaints of altered mental status and abdominal pain     Interval History: Patient sitting up in the chair today. Reports his breathing is better today. Oriented to place. Creatinine up to 1.4 today.     Vital Signs  Temp:  [98.1 °F (36.7 °C)-98.7 °F (37.1 °C)] 98.4 °F (36.9 °C)  Heart Rate:  [70-82] 70  Resp:  [-] 22  BP: (145-160)/(54-73) 145/57     Vital Signs (last 72 hrs)        0700   0659  0700   0659  0700   0659  0700   0839   Most Recent      Temp (°F) 98 -  100.3    97.6 -  98.9    98.1 -  98.7       98.4 (36.9)  0637    Heart Rate 81 -  92    68 -  90    70 -  82       70  0637    Resp 18 -  24    18 -  22    18 -  22       22  0637    /62 -  163/78    132/68 -  149/60    145/57 -  160/73       145/57  0637    SpO2 (%) 91 -  100    95 -  99    96 -  99       97  0637        Documented weights    22 2249 22 0002 22 0403 22 0500   Weight: 86.2 kg (190 lb) 92 kg (202 lb 14.4 oz) 91.8 kg (202 lb 6.4 oz) 97.3 kg (214 lb 8 oz)    22 0500 22 0500   Weight: 96.4 kg (212 lb 8 oz) 94.1 kg (207 lb 8 oz)      Body mass index is 33.49 kg/m².    Intake/Output Summary (Last 24 hours) at 2022 0839  Last data filed at 2022 0500  Gross per 24 hour   Intake 740 ml   Output 3100 ml   Net -2360 ml     Objective    Objective     I have seen and examined  Mr. Canales today  Physical Exam:     General Appearance:    Alert, cooperative, in no acute distress   Head:    Normocephalic, without obvious abnormality, atraumatic   Eyes:            Conjunctivae and sclerae normal, no   icterus, no pallor, corneas clear.   Neck:   No adenopathy, supple, trachea midline, no thyromegaly, no   carotid bruit, no JVD   Lungs:     Clear to auscultation,respirations regular, even and                  unlabored    Heart:    Regular rhythm and normal rate, normal S1 and S2, no            murmur, no gallop, no rub, no click   Chest Wall:    No abnormalities observed   Abdomen:     Normal bowel sounds, no masses, no organomegaly, soft        non-tender, non-distended, no guarding, no rebound                tenderness   Extremities:   Moves all extremities well, no edema, no cyanosis, no             redness   Pulses:   Pulses palpable and equal bilaterally   Skin:   No bleeding, bruising or rash       Neurologic:   Alert, disoriented to place      Results review       Results Review:   Results from last 7 days   Lab Units 01/06/22  0012 01/05/22  0031 01/04/22  0803 01/03/22  0538 01/02/22  1310 01/01/22  2229   WBC 10*3/mm3 8.19 10.16 12.26* 12.55* 13.22* 13.06*   HEMOGLOBIN g/dL 7.9* 8.3* 9.6* 9.6* 10.3* 10.8*   PLATELETS 10*3/mm3 177 142 156 164 148 175     Results from last 7 days   Lab Units 01/06/22  0801 01/06/22  0012 01/05/22  2005 01/05/22  1639 01/05/22  1356 01/05/22  0749 01/05/22  0420 01/05/22  0031 01/05/22  0031 01/04/22  1221 01/04/22  0803 01/03/22  1216 01/03/22  0538 01/02/22  2334 01/02/22  1843 01/02/22  1229 01/02/22  1229 01/02/22  0838 01/02/22  0838 01/02/22  0135 01/01/22  2229   SODIUM mmol/L 134* 131* 130* 126* 129* 124* 124*   < > 126*   < > 124*   < > 121*  121*   < > 122*   < > 122*   < > 121*   < > 114*   POTASSIUM mmol/L  --  3.9  --   --   --   --   --   --  4.3  --  4.3  --  4.1  4.1  --  4.1  --  4.2  --  4.4   < > 3.8   CHLORIDE mmol/L  --  100  --    --   --   --   --   --  96*  --  95*  --  92*  92*  --  89*  --  90*  --  87*   < > 82*   CO2 mmol/L  --  22.2  --   --   --   --   --   --  20.5*  --  17.0*  --  19.6*  19.6*  --  21.0*  --  22.7  --  21.4*   < > 18.5*   BUN mg/dL  --  27*  --   --   --   --   --   --  24*  --  21  --  18  18  --  18  --  20  --  21   < > 24*   CREATININE mg/dL  --  1.46*  --   --   --   --   --   --  1.34*  --  1.38*  --  1.46*  1.46*  --  1.42*  --  1.50*  --  1.46*   < > 1.59*   CALCIUM mg/dL  --  8.9  --   --   --   --   --   --  8.6  --  9.0  --  9.0  9.0  --  9.2  --  9.3  --  9.4   < > 9.7   GLUCOSE mg/dL  --  100*  --   --   --   --   --   --  127*  --  122*  --  116*  116*  --  136*  --  120*  --  146*   < > 164*   ALT (SGPT) U/L  --   --   --   --   --   --   --   --   --   --   --   --  23  --   --   --   --   --   --   --  22   AST (SGOT) U/L  --   --   --   --   --   --   --   --   --   --   --   --  49*  --   --   --   --   --   --   --  37    < > = values in this interval not displayed.     Results from last 7 days   Lab Units 01/01/22 2229   CK TOTAL U/L 345*   TROPONIN T ng/mL 0.013     Lab Results   Component Value Date    INR 0.92 01/01/2022     Lab Results   Component Value Date    MG 1.8 01/01/2022     Lab Results   Component Value Date    TSH 1.960 01/02/2022    CHLPL 165 04/17/2014    TRIG 101 01/06/2022    HDL 40 01/06/2022    LDL 31 01/06/2022      Imaging Results (Last 48 Hours)     Procedure Component Value Units Date/Time    XR Chest 1 View [[id number]] Collected: 01/05/22 1230     Updated: 01/05/22 1233    Narrative:      EXAM:    XR Chest, 1 View     EXAM DATE:    1/5/2022 9:29 AM     CLINICAL HISTORY:    soa; E87.6-Hfiy-aqfcfxcpqt and hyponatremia     TECHNIQUE:    Frontal view of the chest.     COMPARISON:    01/03/2022     FINDINGS:    LUNGS:  Again is patchy bibasilar scattered airspace disease.    PLEURAL SPACE:  Probably tiny pleural effusions.  No pneumothorax.    HEART:  Heart size is  stable.    MEDIASTINUM:  Unremarkable.    BONES/JOINTS:  Unremarkable.       Impression:      1.  Again is patchy bibasilar scattered airspace disease.  2.  Probably tiny pleural effusions.     This report was finalized on 1/5/2022 12:31 PM by Dr. Brayden Davis MD.       MRI Brain Without Contrast [[id number]] Collected: 01/04/22 1158     Updated: 01/04/22 1200    Narrative:      EXAM:    MR Head Without Intravenous Contrast     EXAM DATE:    1/4/2022 10:58 AM     CLINICAL HISTORY:    NPH?, family deferred LP; E87.7-Ehxs-kxecmgptml and hyponatremia     TECHNIQUE:    Magnetic resonance images of the head/brain without intravenous  contrast in multiple planes.     COMPARISON:    No relevant prior studies available.     FINDINGS:    BRAIN:  No evidence of acute infarction.  No hemorrhage.    VENTRICLES:  Ventriculomegaly.    BONES/JOINTS:  Unremarkable.    SINUSES:  Unremarkable as visualized.  No acute sinusitis.    MASTOID AIR CELLS:  Unremarkable as visualized.  No mastoid effusion.    ORBITS:  Unremarkable as visualized.       Impression:      1.  Ventriculomegaly.  2.  No evidence of acute infarction.     This report was finalized on 1/4/2022 11:58 AM by Dr. Brayden Davis MD.           Lab Results   Component Value Date     (H) 08/26/2015      I reviewed the patient's new clinical results.    Telemetry: NSR 60-80 bpm      Medication Review:   aspirin, 81 mg, Oral, Daily  aztreonam, 2 g, Intravenous, Q8H  insulin aspart, 0-7 Units, Subcutaneous, TID AC  levothyroxine, 75 mcg, Oral, Q AM  metroNIDAZOLE, 500 mg, Intravenous, Q8H  polyethylene glycol, 17 g, Oral, Daily  rosuvastatin, 10 mg, Oral, Nightly  senna-docusate sodium, 1 tablet, Oral, Daily  sodium chloride, 10 mL, Intravenous, Q12H  sodium chloride, 1 g, Oral, TID With Meals             Assessment      Assessment:  1. ASCVD, status post CABG in 2005, appears clinically stable  2. Status post TAVR in 2017 for severe arctic stenosis, clinically  asymptomatic and stable.  3. Severe hyponatremia, seems resolved.    Plan     Recommendations:  1. We will restart carvedilol for now.  2. We will review the echo Doppler study when available and if his LV ejection fraction is less than 50% then will consider adding ARB/Entresto as tolerated by his blood pressure  3. Continue with low-dose aspirin and rosuvastatin.    I discussed the patients findings and my recommendations with patient.      Electronically signed by NAHOMI Kelley, 01/06/22, 8:39 AM EST.    Electronically signed by Torres Huerta MD, 01/06/22, 11:06 AM EST.      Please note that portions of this note were completed with a voice recognition program.

## 2022-01-06 NOTE — DISCHARGE INSTR - APPOINTMENTS
Pt  has  an  apt  with  dr tayo mcghee  in  [place]  office  for  January 12  at 11 :45    apt  with  dr renetta baez  at  [phone]  in  1  week   with  [facility]eral  for  pulmonology  filiberto go  at  ut  at  [phone]    daughter  request  him     for  small  mass in  chest   office  closed on  the  weekend  will  call Monday  and  get  apt   and  call pt  with  apt

## 2022-01-06 NOTE — PROGRESS NOTES
[facility] HOSPITALIST PROGRESS NOTE     Patient Identification:  Name:  Feroz Canales  Age:  86 y.o.  Sex:  male  :  1935  MRN:  [id number]  Visit Number:  [id number]  ROOM: [address] [place] [address]     Primary Care Provider:  Provider, No Known    Length of stay in inpatient status:  4    Subjective     Chief Compliant:    Chief Complaint   Patient presents with   • Altered Mental Status   • Vomiting     History of Presenting Illness:    Patient remains ill but stable, no acute events overnight, no new complaints, sitting in bedside chair eating breakfast, no new complaints, Na up to 131-134, Nephrology following, continued on salt tabs today, recheck Na in AM, Cr stable, continued on Abx, urine in stephen more clear after holding SQH, Hgb slightly lower today at 7.9, he denies any fevers or chills, 02 stable, echo pending, cards resumed home coreg, WBC count stable, CRP downtrending.   Objective     Current Hospital Meds:aspirin, 81 mg, Oral, Daily  aztreonam, 2 g, Intravenous, Q8H  carvedilol, 6.25 mg, Oral, BID With Meals  insulin aspart, 0-7 Units, Subcutaneous, TID AC  levothyroxine, 75 mcg, Oral, Q AM  metroNIDAZOLE, 500 mg, Intravenous, Q8H  polyethylene glycol, 17 g, Oral, Daily  rosuvastatin, 10 mg, Oral, Nightly  senna-docusate sodium, 1 tablet, Oral, Daily  sodium chloride, 10 mL, Intravenous, Q12H  sodium chloride, 1 g, Oral, TID With Meals         Current Antimicrobial Therapy:  Anti-Infectives (From admission, onward)    Ordered     Dose/Rate Route Frequency Start Stop    22 0323  metroNIDAZOLE (FLAGYL) 500 mg/100mL IVPB        Ordering Provider: Isabel Servin DO    500 mg Intravenous Every 8 Hours 22 0800 01/10/22 0759    22 1236  aztreonam (AZACTAM) 2 g in sodium chloride 0.9 % 100 mL IVPB-VTB        Ordering Provider: Chago Alvarenga MD    2 g  over 4 Hours Intravenous Every 8 Hours 22 1921 22 1959    22 1236  aztreonam (AZACTAM) 2 g in sodium chloride  0.9 % 100 mL IVPB-VTB        Ordering Provider: Nikkie Escamilla PA    2 g  200 mL/hr over 30 Minutes Intravenous Once 01/02/22 1238 01/02/22 1326    01/02/22 0953  vancomycin 1750 mg/500 mL 0.9% NS IVPB ([facility])        Ordering Provider: Seth Nagy MD    20 mg/kg × 86.2 kg  over 2 Hours Intravenous Once 01/02/22 0955 01/02/22 1205    01/02/22 0306  clindamycin (CLEOCIN) 600 mg in dextrose 5% 50 mL IVPB (premix)        Ordering Provider: Avinash Centeno DO    600 mg Intravenous Once 01/02/22 0308 01/02/22 0537        Current Diuretic Therapy:  Diuretics (From admission, onward)    None        ----------------------------------------------------------------------------------------------------------------------  Vital Signs:  Temp:  [98.1 °F (36.7 °C)-98.4 °F (36.9 °C)] 98.1 °F (36.7 °C)  Heart Rate:  [67-80] 67  Resp:  [18-22] 20  BP: (137-160)/(50-73) 137/50  SpO2:  [96 %-97 %] 97 %  on  Flow (L/min):  [2] 2;   Device (Oxygen Therapy): nasal cannula  Body mass index is 33.49 kg/m².    Wt Readings from Last 3 Encounters:   01/06/22 94.1 kg (207 lb 8 oz)     Intake & Output (last 3 days)       01/03 0701 01/04 0700 01/04 0701 01/05 0700 01/05 0701 01/06 0700 01/06 0701 01/07 0700    P.O. 2300 680 540     I.V. (mL/kg)   200 (2.1)     IV Piggyback        Total Intake(mL/kg) 2300 (23.6) 680 (7.1) 740 (7.9)     Urine (mL/kg/hr) 400 (0.2) 1850 (0.8) 3100 (1.4) 1600 (2.9)    Stool  0 0     Total Output 400 1850 3100 1600    Net +1900 -1170 -2360 -1600            Stool Unmeasured Occurrence  0 x 1 x         Diet Soft Texture; Ground; Daily Fluid Restriction, Consistent Carbohydrate, Renal; Other; 2,000  ----------------------------------------------------------------------------------------------------------------------  Physical exam:  Constitutional:  Elderly, No acute distress.      HENT:  Head:  Normocephalic and atraumatic.  Mouth:  Moist mucous membranes.    Eyes:  Conjunctivae and EOM are normal. No  scleral icterus.    Neck:  Neck supple.  No JVD present.    Cardiovascular:  Normal rate, regular rhythm and normal heart sounds with no murmur.  Pulmonary/Chest:  No respiratory distress, no wheezes, on 2LNC still  Abdominal:  Soft. No distension and no tenderness.   Musculoskeletal:  No tenderness, and no deformity.     Neurological:  Alert and oriented to person, place.  No gross focal deficits   Skin:  Skin is warm and dry. No rash noted. No pallor.   Peripheral vascular:  No clubbing, no cyanosis, no edema.  : Llamas in place  ----------------------------------------------------------------------------------------------------------------------  Results from last 7 days   Lab Units 01/06/22  0012 01/05/22  0749 01/05/22  0031 01/04/22  0803 01/02/22  1310 01/02/22  1229 01/01/22  2231 01/01/22  2229 01/01/22  2229   CRP mg/dL 6.33* 10.66*  --   --   --  0.66*  --    < > <0.30   LACTATE mmol/L  --   --   --   --   --   --  1.5  --   --    WBC 10*3/mm3 8.19  --  10.16 12.26*   < >  --   --   --  13.06*   HEMOGLOBIN g/dL 7.9*  --  8.3* 9.6*   < >  --   --   --  10.8*   HEMATOCRIT % 24.4*  --  25.2* 29.7*   < >  --   --   --  32.3*   MCV fL 97.2*  --  95.5 97.1*   < >  --   --   --  93.6   MCHC g/dL 32.4  --  32.9 32.3   < >  --   --   --  33.4   PLATELETS 10*3/mm3 177  --  142 156   < >  --   --   --  175   INR   --   --   --   --   --   --   --   --  0.92    < > = values in this interval not displayed.     Results from last 7 days   Lab Units 01/05/22  1417   PH, ARTERIAL pH units 7.420   PO2 ART mm Hg 82.2*   PCO2, ARTERIAL mm Hg 35.9   HCO3 ART mmol/L 23.2     Results from last 7 days   Lab Units 01/06/22  0801 01/06/22  0012 01/05/22  2005 01/05/22  0420 01/05/22  0031 01/04/22  1221 01/04/22  0803 01/03/22  1216 01/03/22  0538 01/02/22  0135 01/01/22  2229   SODIUM mmol/L 134* 131* 130*   < > 126*   < > 124*   < > 121*  121*   < > 114*   POTASSIUM mmol/L  --  3.9  --   --  4.3  --  4.3  --  4.1  4.1   < >  3.8   MAGNESIUM mg/dL  --   --   --   --   --   --   --   --   --   --  1.8   CHLORIDE mmol/L  --  100  --   --  96*  --  95*  --  92*  92*   < > 82*   CO2 mmol/L  --  22.2  --   --  20.5*  --  17.0*  --  19.6*  19.6*   < > 18.5*   BUN mg/dL  --  27*  --   --  24*  --  21  --  18  18   < > 24*   CREATININE mg/dL  --  1.46*  --   --  1.34*  --  1.38*  --  1.46*  1.46*   < > 1.59*   EGFR IF NONAFRICN AM mL/min/1.73  --  46*  --   --  51*  --  49*  --  46*  46*   < > 41*   CALCIUM mg/dL  --  8.9  --   --  8.6  --  9.0  --  9.0  9.0   < > 9.7   GLUCOSE mg/dL  --  100*  --   --  127*  --  122*  --  116*  116*   < > 164*   ALBUMIN g/dL  --   --   --   --   --   --   --   --  3.69  --  4.26   BILIRUBIN mg/dL  --   --   --   --   --   --   --   --  0.3  --  0.5   ALK PHOS U/L  --   --   --   --   --   --   --   --  67  --  89   AST (SGOT) U/L  --   --   --   --   --   --   --   --  49*  --  37   ALT (SGPT) U/L  --   --   --   --   --   --   --   --  23  --  22    < > = values in this interval not displayed.   Estimated Creatinine Clearance: 39 mL/min (A) (by C-G formula based on SCr of 1.46 mg/dL (H)).  No results found for: AMMONIA  Results from last 7 days   Lab Units 01/01/22  2229   CK TOTAL U/L 345*   TROPONIN T ng/mL 0.013     Results from last 7 days   Lab Units 01/02/22  0135   PROBNP pg/mL 413.0     Results from last 7 days   Lab Units 01/06/22  0012   CHOLESTEROL mg/dL 90   TRIGLYCERIDES mg/dL 101   HDL CHOL mg/dL 40   LDL CHOL mg/dL 31     Hemoglobin A1C   Date/Time Value Ref Range Status   01/04/2022 0803 5.90 (H) 4.80 - 5.60 % Final     Glucose   Date/Time Value Ref Range Status   01/06/2022 1025 152 (H) 70 - 130 mg/dL Final     Comment:     Meter: [id number] : [id number] SUAD QUINTEROS   01/06/2022 0648 97 70 - 130 mg/dL Final     Comment:     Meter: [id number] : [id number] SARAVANAN JO   01/05/2022 1942 136 (H) 70 - 130 mg/dL Final     Comment:     Meter: [id number] : [id number] ANKUSH  AVELINA   01/05/2022 1748 179 (H) 70 - 130 mg/dL Final     Comment:     Meter: [id number] : [id number] Ton Anderson   01/05/2022 1603 227 (H) 70 - 130 mg/dL Final     Comment:     Meter: [id number] : [id number] BLESSING GERARD   01/05/2022 1008 235 (H) 70 - 130 mg/dL Final     Comment:     Meter: [id number] : [id number] BLESSING [facility]   01/05/2022 0603 114 70 - 130 mg/dL Final     Comment:     Meter: [id number] : [id number] BLESSING [facility]   01/04/2022 1909 142 (H) 70 - 130 mg/dL Final     Comment:     Meter: [id number] : [id number] ANKUSH QUAN     Lab Results   Component Value Date    TSH 1.960 01/02/2022     No results found for: PREGTESTUR, PREGSERUM, HCG, HCGQUANT  Pain Management Panel     Pain Management Panel Latest Ref Rng & Units 1/2/2022 6/25/2020    CREATININE UR mg/dL - 56.2    AMPHETAMINES SCREEN, URINE Negative Negative -    BARBITURATES SCREEN Negative Negative -    BENZODIAZEPINE SCREEN, URINE Negative Negative -    BUPRENORPHINEUR Negative Negative -    COCAINE SCREEN, URINE Negative Negative -    METHADONE SCREEN, URINE Negative Negative -    METHAMPHETAMINEUR Negative Negative -        Brief Urine Lab Results  (Last result in the past 365 days)      Color   Clarity   Blood   Leuk Est   Nitrite   Protein   CREAT   Urine HCG        01/02/22 0029 Yellow   Clear   Negative   Negative   Negative   100 mg/dL (2+)               Blood Culture   Date Value Ref Range Status   01/01/2022 No growth at 4 days  Preliminary   01/01/2022 No growth at 4 days  Preliminary     No results found for: URINECX  No results found for: WOUNDCX  No results found for: STOOLCX  No results found for: RESPCX  No results found for: AFBCX  Results from last 7 days   Lab Units 01/06/22  0012 01/05/22  0749 01/02/22  1310 01/02/22  1229 01/01/22  2231 01/01/22  2229   LACTATE mmol/L  --   --   --   --  1.5  --    SED RATE mm/hr  --   --  11  --   --  26*   CRP mg/dL 6.33* 10.66*  --  0.66*  --  <0.30     I  have personally looked at the labs and they are summarized above.  ----------------------------------------------------------------------------------------------------------------------  Detailed radiology reports for the last 24 hours:  Imaging Results (Last 24 Hours)     ** No results found for the last 24 hours. **        Assessment & Plan    86M Obese by BMI PMH CHF (details unknown), prior TAVR, diabetes mellitus, CKD, BPH, and hypertension who presents to the ED complaining of altered mental status and abdominal pain, found to have severe hyponatremia, witnessed aspiration with new pneumonia.    #Sepsis, Acute Metabolic Encephalopathy & Acute Hypoxic Respiratory Failure 2/2 PNA, Bacterial, treating for MDR Organisms/Aspiration  - Patient presented w/ confusion, HR > 90, RR > 20, WBC count > 12K, PNA on imaging.  - Admission labs showed WBC count 13K, CRP < 0.3 -> 0.66, lactate 1.5, Covid/flu negative, UA benign, Bcx's NGTD  - CTPE showed No PE, mild infiltrate and/or atelectasis in dependent portions posterior lung bases  - SLP consulted; Following, no noted aspiration  - Continue Azactam and Flagyl x 7 days  - Continue APAP PRN for fevers, Duonebs PRN  - Monitor on telemetry, continue 02 but wean as able    #Electrolyte Abnormalities  - Borderline Hypomagnesemia - Mg 1.8 on admission, Replaced per protocol  - Acute vs Chronic Hyponatremia - Na 114 on admission, Holding home HCTZ and Bumex, Nephrology consulted and following, suspect SIADH, added salt tabs, on 1.5L fluid restriction, trending up 131-134 today    #HTN/HLD/Non-obstructive CAD and chronic LBBB  #Chronic CHF w/ unknown EF  #Hx AS s/p TAVR  - Labs showed trops 0.013, proBNP 413  - EKG showed NSR, LBBB  - Echo pending  - CT showed mild cardiomegaly, prior CABG, prior TAVR  - Cards consulted; Following, add back BB today  - Continue home , statin  - Resume home Coreg  - Holding home ARB due to sepsis, resume as indicated  - Holding home  "Amlodipine, Hydralazine, Spironolactone due to sepsis, resume as indicated  - Holding home Bumex as per above, resume as indicated  - CXR w/ b/l small effusions, f/u updated cards recs regarding resuming home meds and diuresis.   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  - MRI showed ventriculomegaly, no evidence of infarct  - Consult TeleNeurology; Rec'd outpatient evaluation by movement disorder specialist and will refer upon discharge    #Irregular Spiculated Posterior L Costophrenic Angle Mass  - CT Chest showed small suspicious irregular mass posterior L costophrenic angle, measures 2.2cm, rec'd serial f/u 2-3m w/ Chest CT and if persists PET thereafter  - Pulm consulted; Rec'd repeat CT Chest 2-3 months, consider PET at that time, f/u outpatient pulm closer to home    #CKDIII  - Patient presented w/ Cr 1.59, b/l around 1.3-1.5, has remained w/in baseline  - Trend Cr and UOP, avoid nephrotoxins, NSAIDs, dehydration and contrast as able.     #IDDM Type II, controlled, unknown complications  - Hgb A1c = 5.9%  - No home oral agents, continue FSBG and SSI, holding home levemir for now, resume as indicated.    #Hematuria   - Noted redish urine in stephen 1/4, worse 1/6, Hgb down to 7.9 today, d/c'd SQH previously, trending    #Hypothyroid  - Continue home Levothyroxine    #Chronic Pain  - Reviewed MARCUS, holding home gabapentin for now due to confusion    #Advanced Age/Age Related Debility  - Supportive Care, Consult PT/OT    #Obesity by BMI  - BMI 34, complicates all aspects of care     F: Oral  E: Monitor & Replace PRN  N: Soft Texture per daughter request  Ppx: SCDs  Code: " Full Code     Dispo: Pending workup and clinical improvement     *This patient is considered high risk due to sepsis, acute respiratory failure, PNA, acute encephalopathy, spiculated lung mass, ventriculomegaly, severe hyponatremia.    VTE Prophylaxis:   Mechanical Order History:      Ordered        01/05/22 1250  Place Sequential Compression Device  Once            01/05/22 1250  Maintain Sequential Compression Device  Continuous                    Pharmalogical Order History:      Ordered     Dose Route Frequency Stop    01/02/22 3888  heparin (porcine) 5000 UNIT/ML injection 5,000 Units  Status:  Discontinued         5,000 Units SC Every 12 Hours Scheduled 01/05/22 1250              Chago Alvarenga MD  [facility]  01/06/22  12:51 EST

## 2022-01-06 NOTE — PROGRESS NOTES
Nephrology Progress Note      Subjective     Pt feels much better, no chest pain or shortness of breath  Objective       Vital signs :     Temp:  [98.1 °F (36.7 °C)-98.7 °F (37.1 °C)] 98.4 °F (36.9 °C)  Heart Rate:  [70-82] 70  Resp:  [18-22] 22  BP: (145-160)/(54-73) 145/57      Intake/Output Summary (Last 24 hours) at 1/6/2022 0759  Last data filed at 1/6/2022 0500  Gross per 24 hour   Intake 740 ml   Output 3100 ml   Net -2360 ml       Physical Exam:    General Appearance : not in acute distress  Lungs : clear to auscultation, respirations regular  Heart :  regular rhythm & normal rate, normal S1, S2 and no murmur, no rub  Abdomen : normal bowel sounds, no masses, no hepatomegaly, no splenomegaly, soft non-tender and no guarding  Extremities : moves extremities well, no edema, no cyanosis and no redness  Neurologic :  Unable to assess., not fully cooperative.   Acess :       Laboratory Data :     Albumin No results found for: ALBUMIN   Magnesium No results found for: MG       PTH               No results found for: PTH    CBC and coagulation:  Results from last 7 days   Lab Units 01/06/22  0012 01/05/22  0749 01/05/22  0031 01/04/22  0803 01/03/22  0538 01/02/22  1310 01/02/22  1229 01/01/22  2231 01/01/22  2229 01/01/22  2229   LACTATE mmol/L  --   --   --   --   --   --   --  1.5  --   --    SED RATE mm/hr  --   --   --   --   --  11  --   --   --  26*   CRP mg/dL 6.33* 10.66*  --   --   --   --  0.66*  --    < > <0.30   WBC 10*3/mm3 8.19  --  10.16 12.26*   < > 13.22*  --   --    < > 13.06*   HEMOGLOBIN g/dL 7.9*  --  8.3* 9.6*   < > 10.3*  --   --    < > 10.8*   HEMATOCRIT % 24.4*  --  25.2* 29.7*   < > 31.0*  --   --    < > 32.3*   MCV fL 97.2*  --  95.5 97.1*   < > 93.9  --   --    < > 93.6   MCHC g/dL 32.4  --  32.9 32.3   < > 33.2  --   --    < > 33.4   PLATELETS 10*3/mm3 177  --  142 156   < > 148  --   --    < > 175   INR   --   --   --   --   --   --   --   --   --  0.92    < > = values in this  interval not displayed.     Acid/base balance:  Results from last 7 days   Lab Units 01/05/22  1417 01/01/22  2306   PH, ARTERIAL pH units 7.420 7.409   PO2 ART mm Hg 82.2* 68.6*   PCO2, ARTERIAL mm Hg 35.9 31.8*   HCO3 ART mmol/L 23.2 20.0     Renal and electrolytes:  Results from last 7 days   Lab Units 01/06/22  0012 01/05/22  2005 01/05/22  1639 01/05/22  1356 01/05/22  0749 01/05/22  0420 01/05/22  0031 01/04/22  1221 01/04/22  0803 01/03/22  1216 01/03/22  0538 01/02/22  2334 01/02/22  1843 01/02/22  0135 01/01/22  2229   SODIUM mmol/L 131* 130* 126* 129* 124*   < > 126*   < > 124*   < > 121*  121*   < > 122*   < > 114*   POTASSIUM mmol/L 3.9  --   --   --   --   --  4.3  --  4.3  --  4.1  4.1   < > 4.1   < > 3.8   MAGNESIUM mg/dL  --   --   --   --   --   --   --   --   --   --   --   --   --   --  1.8   CHLORIDE mmol/L 100  --   --   --   --   --  96*  --  95*  --  92*  92*   < > 89*   < > 82*   CO2 mmol/L 22.2  --   --   --   --   --  20.5*  --  17.0*  --  19.6*  19.6*   < > 21.0*   < > 18.5*   BUN mg/dL 27*  --   --   --   --   --  24*  --  21  --  18  18   < > 18   < > 24*   CREATININE mg/dL 1.46*  --   --   --   --   --  1.34*  --  1.38*  --  1.46*  1.46*  --  1.42*   < > 1.59*   EGFR IF NONAFRICN AM mL/min/1.73 46*  --   --   --   --   --  51*  --  49*  --  46*  46*   < > 47*   < > 41*   CALCIUM mg/dL 8.9  --   --   --   --   --  8.6  --  9.0  --  9.0  9.0   < > 9.2   < > 9.7    < > = values in this interval not displayed.     Estimated Creatinine Clearance: 39 mL/min (A) (by C-G formula based on SCr of 1.46 mg/dL (H)).    Liver and pancreatic function:  Results from last 7 days   Lab Units 01/03/22  0538 01/01/22  2315 01/01/22  2229   ALBUMIN g/dL 3.69  --  4.26   BILIRUBIN mg/dL 0.3  --  0.5   ALK PHOS U/L 67  --  89   AST (SGOT) U/L 49*  --  37   ALT (SGPT) U/L 23  --  22   AMMONIA umol/L  --  17  --          Cardiac:  Results from last 7 days   Lab Units 01/02/22  0135   PROBNP pg/mL 413.0      Liver and pancreatic function:  Results from last 7 days   Lab Units 01/03/22  0538 01/01/22  2315 01/01/22  2229   ALBUMIN g/dL 3.69  --  4.26   BILIRUBIN mg/dL 0.3  --  0.5   ALK PHOS U/L 67  --  89   AST (SGOT) U/L 49*  --  37   ALT (SGPT) U/L 23  --  22   AMMONIA umol/L  --  17  --        Medications :     aspirin, 81 mg, Oral, Daily  aztreonam, 2 g, Intravenous, Q8H  insulin aspart, 0-7 Units, Subcutaneous, TID AC  levothyroxine, 75 mcg, Oral, Q AM  metroNIDAZOLE, 500 mg, Intravenous, Q8H  polyethylene glycol, 17 g, Oral, Daily  rosuvastatin, 10 mg, Oral, Nightly  senna-docusate sodium, 1 tablet, Oral, Daily  sodium chloride, 10 mL, Intravenous, Q12H  sodium chloride, 1 g, Oral, TID With Meals             Assessment/Plan     1.  Presumed chronic hyponatremia, hypoosmolar  2.  Metabolic encephalopathy  3.  Lung mass  4.  Essential Hypertension  5. 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 Type 2 diabetes    Sodium improved to 131 appropriately, relatively stable renal functions. Clinically much better as well. 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

## 2022-01-06 NOTE — PLAN OF CARE
Goal Outcome Evaluation:  Plan of Care Reviewed With: patient        Progress: improving  Outcome Summary: Pt currently resting quietly in bed with no complaints noted at this time. V/S stable with no signs of respiratory distress present. Pt's sodium has maintained @ 131 today. Will continue to monitor pt and follow plan of care.

## 2022-01-06 NOTE — PLAN OF CARE
Patient resting in the bed throughout the night. No s/s of distress noted. His daughter Naima wanted to refuse his 4 AM sodium draw to not interrupt his sleep schedule. Na level is 131 and lab was made aware to come back at the 8 AM draw. Will continue to monitor and follow care plan.

## 2022-01-07 LAB
ANION GAP SERPL CALCULATED.3IONS-SCNC: 8.3 MMOL/L (ref 5–15)
BASOPHILS # BLD AUTO: 0.09 10*3/MM3 (ref 0–0.2)
BASOPHILS NFR BLD AUTO: 1.1 % (ref 0–1.5)
BH CV ECHO MEAS - % IVS THICK: -1.4 %
BH CV ECHO MEAS - % LVPW THICK: 13.9 %
BH CV ECHO MEAS - AO MAX PG (FULL): 13 MMHG
BH CV ECHO MEAS - AO MAX PG: 15.5 MMHG
BH CV ECHO MEAS - AO MEAN PG (FULL): 5 MMHG
BH CV ECHO MEAS - AO MEAN PG: 6 MMHG
BH CV ECHO MEAS - AO ROOT AREA (BSA CORRECTED): 1.3
BH CV ECHO MEAS - AO ROOT AREA: 5.7 CM^2
BH CV ECHO MEAS - AO ROOT DIAM: 2.7 CM
BH CV ECHO MEAS - AO V2 MAX: 197 CM/SEC
BH CV ECHO MEAS - AO V2 MEAN: 116 CM/SEC
BH CV ECHO MEAS - AO V2 VTI: 46.3 CM
BH CV ECHO MEAS - AVA(I,A): 1.2 CM^2
BH CV ECHO MEAS - AVA(I,D): 1.2 CM^2
BH CV ECHO MEAS - AVA(V,A): 1.1 CM^2
BH CV ECHO MEAS - AVA(V,D): 1.1 CM^2
BH CV ECHO MEAS - BSA(HAYCOCK): 2.1 M^2
BH CV ECHO MEAS - BSA: 2 M^2
BH CV ECHO MEAS - BZI_BMI: 33.4 KILOGRAMS/M^2
BH CV ECHO MEAS - BZI_METRIC_HEIGHT: 167.6 CM
BH CV ECHO MEAS - BZI_METRIC_WEIGHT: 93.9 KG
BH CV ECHO MEAS - EDV(CUBED): 113.7 ML
BH CV ECHO MEAS - EDV(MOD-SP4): 152 ML
BH CV ECHO MEAS - EDV(TEICH): 109.9 ML
BH CV ECHO MEAS - EF(CUBED): 54.6 %
BH CV ECHO MEAS - EF(MOD-SP4): 53.8 %
BH CV ECHO MEAS - EF(TEICH): 46.2 %
BH CV ECHO MEAS - ESV(CUBED): 51.7 ML
BH CV ECHO MEAS - ESV(MOD-SP4): 70.3 ML
BH CV ECHO MEAS - ESV(TEICH): 59.1 ML
BH CV ECHO MEAS - FS: 23.1 %
BH CV ECHO MEAS - IVS/LVPW: 1.1
BH CV ECHO MEAS - IVSD: 1.4 CM
BH CV ECHO MEAS - IVSS: 1.4 CM
BH CV ECHO MEAS - LA DIMENSION: 3.7 CM
BH CV ECHO MEAS - LA/AO: 1.4
BH CV ECHO MEAS - LV DIASTOLIC VOL/BSA (35-75): 74.9 ML/M^2
BH CV ECHO MEAS - LV MASS(C)D: 257 GRAMS
BH CV ECHO MEAS - LV MASS(C)DI: 126.6 GRAMS/M^2
BH CV ECHO MEAS - LV MASS(C)S: 189.8 GRAMS
BH CV ECHO MEAS - LV MASS(C)SI: 93.5 GRAMS/M^2
BH CV ECHO MEAS - LV MAX PG: 2.5 MMHG
BH CV ECHO MEAS - LV MEAN PG: 1 MMHG
BH CV ECHO MEAS - LV SYSTOLIC VOL/BSA (12-30): 34.6 ML/M^2
BH CV ECHO MEAS - LV V1 MAX: 79.3 CM/SEC
BH CV ECHO MEAS - LV V1 MEAN: 46.2 CM/SEC
BH CV ECHO MEAS - LV V1 VTI: 18.8 CM
BH CV ECHO MEAS - LVIDD: 4.8 CM
BH CV ECHO MEAS - LVIDS: 3.7 CM
BH CV ECHO MEAS - LVLD AP4: 8.8 CM
BH CV ECHO MEAS - LVLS AP4: 8 CM
BH CV ECHO MEAS - LVOT AREA (M): 2.8 CM^2
BH CV ECHO MEAS - LVOT AREA: 2.8 CM^2
BH CV ECHO MEAS - LVOT DIAM: 1.9 CM
BH CV ECHO MEAS - LVPWD: 1.3 CM
BH CV ECHO MEAS - LVPWS: 1.4 CM
BH CV ECHO MEAS - MV A MAX VEL: 122 CM/SEC
BH CV ECHO MEAS - MV E MAX VEL: 135 CM/SEC
BH CV ECHO MEAS - MV E/A: 1.1
BH CV ECHO MEAS - MV MAX PG: 11 MMHG
BH CV ECHO MEAS - MV MEAN PG: 4 MMHG
BH CV ECHO MEAS - MV V2 MAX: 166 CM/SEC
BH CV ECHO MEAS - MV V2 MEAN: 83.1 CM/SEC
BH CV ECHO MEAS - MV V2 VTI: 48.1 CM
BH CV ECHO MEAS - MVA(VTI): 1.1 CM^2
BH CV ECHO MEAS - SI(AO): 130.6 ML/M^2
BH CV ECHO MEAS - SI(CUBED): 30.6 ML/M^2
BH CV ECHO MEAS - SI(LVOT): 26.3 ML/M^2
BH CV ECHO MEAS - SI(MOD-SP4): 40.3 ML/M^2
BH CV ECHO MEAS - SI(TEICH): 25 ML/M^2
BH CV ECHO MEAS - SV(AO): 265.1 ML
BH CV ECHO MEAS - SV(CUBED): 62 ML
BH CV ECHO MEAS - SV(LVOT): 53.3 ML
BH CV ECHO MEAS - SV(MOD-SP4): 81.7 ML
BH CV ECHO MEAS - SV(TEICH): 50.8 ML
BUN SERPL-MCNC: 22 MG/DL (ref 8–23)
BUN/CREAT SERPL: 17.2 (ref 7–25)
CALCIUM SPEC-SCNC: 9.2 MG/DL (ref 8.6–10.5)
CHLORIDE SERPL-SCNC: 105 MMOL/L (ref 98–107)
CO2 SERPL-SCNC: 23.7 MMOL/L (ref 22–29)
CREAT SERPL-MCNC: 1.28 MG/DL (ref 0.76–1.27)
CRP SERPL-MCNC: 3.56 MG/DL (ref 0–0.5)
DEPRECATED RDW RBC AUTO: 53.4 FL (ref 37–54)
EOSINOPHIL # BLD AUTO: 0.66 10*3/MM3 (ref 0–0.4)
EOSINOPHIL NFR BLD AUTO: 8 % (ref 0.3–6.2)
ERYTHROCYTE [DISTWIDTH] IN BLOOD BY AUTOMATED COUNT: 14.7 % (ref 12.3–15.4)
GFR SERPL CREATININE-BSD FRML MDRD: 53 ML/MIN/1.73
GLUCOSE BLDC GLUCOMTR-MCNC: 116 MG/DL (ref 70–130)
GLUCOSE BLDC GLUCOMTR-MCNC: 116 MG/DL (ref 70–130)
GLUCOSE BLDC GLUCOMTR-MCNC: 175 MG/DL (ref 70–130)
GLUCOSE BLDC GLUCOMTR-MCNC: 214 MG/DL (ref 70–130)
GLUCOSE SERPL-MCNC: 107 MG/DL (ref 65–99)
HCT VFR BLD AUTO: 28.6 % (ref 37.5–51)
HGB BLD-MCNC: 9.1 G/DL (ref 13–17.7)
IMM GRANULOCYTES # BLD AUTO: 0.14 10*3/MM3 (ref 0–0.05)
IMM GRANULOCYTES NFR BLD AUTO: 1.7 % (ref 0–0.5)
LYMPHOCYTES # BLD AUTO: 1.11 10*3/MM3 (ref 0.7–3.1)
LYMPHOCYTES NFR BLD AUTO: 13.4 % (ref 19.6–45.3)
MCH RBC QN AUTO: 31.6 PG (ref 26.6–33)
MCHC RBC AUTO-ENTMCNC: 31.8 G/DL (ref 31.5–35.7)
MCV RBC AUTO: 99.3 FL (ref 79–97)
MONOCYTES # BLD AUTO: 1.05 10*3/MM3 (ref 0.1–0.9)
MONOCYTES NFR BLD AUTO: 12.7 % (ref 5–12)
NEUTROPHILS NFR BLD AUTO: 5.21 10*3/MM3 (ref 1.7–7)
NEUTROPHILS NFR BLD AUTO: 63.1 % (ref 42.7–76)
NRBC BLD AUTO-RTO: 0 /100 WBC (ref 0–0.2)
PLATELET # BLD AUTO: 219 10*3/MM3 (ref 140–450)
PMV BLD AUTO: 10 FL (ref 6–12)
POTASSIUM SERPL-SCNC: 3.7 MMOL/L (ref 3.5–5.2)
RBC # BLD AUTO: 2.88 10*6/MM3 (ref 4.14–5.8)
SODIUM SERPL-SCNC: 137 MMOL/L (ref 136–145)
WBC NRBC COR # BLD: 8.26 10*3/MM3 (ref 3.4–10.8)

## 2022-01-07 PROCEDURE — 85025 COMPLETE CBC W/AUTO DIFF WBC: CPT | Performed by: INTERNAL MEDICINE

## 2022-01-07 PROCEDURE — 99232 SBSQ HOSP IP/OBS MODERATE 35: CPT | Performed by: INTERNAL MEDICINE

## 2022-01-07 PROCEDURE — 94799 UNLISTED PULMONARY SVC/PX: CPT

## 2022-01-07 PROCEDURE — 63710000001 INSULIN ASPART PER 5 UNITS: Performed by: INTERNAL MEDICINE

## 2022-01-07 PROCEDURE — 82962 GLUCOSE BLOOD TEST: CPT

## 2022-01-07 PROCEDURE — 25010000002 HYDRALAZINE PER 20 MG: Performed by: INTERNAL MEDICINE

## 2022-01-07 PROCEDURE — 86140 C-REACTIVE PROTEIN: CPT | Performed by: INTERNAL MEDICINE

## 2022-01-07 PROCEDURE — 80048 BASIC METABOLIC PNL TOTAL CA: CPT | Performed by: INTERNAL MEDICINE

## 2022-01-07 RX ORDER — DEXTROSE MONOHYDRATE 50 MG/ML
75 INJECTION, SOLUTION INTRAVENOUS CONTINUOUS
Status: DISCONTINUED | OUTPATIENT
Start: 2022-01-07 | End: 2022-01-07

## 2022-01-07 RX ORDER — LOSARTAN POTASSIUM 50 MG/1
50 TABLET ORAL
Status: DISCONTINUED | OUTPATIENT
Start: 2022-01-07 | End: 2022-01-08 | Stop reason: HOSPADM

## 2022-01-07 RX ADMIN — SODIUM CHLORIDE 2 G: 9 INJECTION, SOLUTION INTRAVENOUS at 20:25

## 2022-01-07 RX ADMIN — ROSUVASTATIN CALCIUM 10 MG: 10 TABLET, FILM COATED ORAL at 20:25

## 2022-01-07 RX ADMIN — METRONIDAZOLE 500 MG: 500 INJECTION, SOLUTION INTRAVENOUS at 00:19

## 2022-01-07 RX ADMIN — METRONIDAZOLE 500 MG: 500 INJECTION, SOLUTION INTRAVENOUS at 08:50

## 2022-01-07 RX ADMIN — SODIUM CHLORIDE, PRESERVATIVE FREE 10 ML: 5 INJECTION INTRAVENOUS at 08:29

## 2022-01-07 RX ADMIN — SODIUM CHLORIDE 2 G: 9 INJECTION, SOLUTION INTRAVENOUS at 12:00

## 2022-01-07 RX ADMIN — SODIUM CHLORIDE 2 G: 9 INJECTION, SOLUTION INTRAVENOUS at 04:48

## 2022-01-07 RX ADMIN — METRONIDAZOLE 500 MG: 500 INJECTION, SOLUTION INTRAVENOUS at 17:32

## 2022-01-07 RX ADMIN — DOCUSATE SODIUM 50 MG AND SENNOSIDES 8.6 MG 1 TABLET: 8.6; 5 TABLET, FILM COATED ORAL at 08:29

## 2022-01-07 RX ADMIN — CARVEDILOL 6.25 MG: 6.25 TABLET, FILM COATED ORAL at 17:33

## 2022-01-07 RX ADMIN — CARVEDILOL 6.25 MG: 6.25 TABLET, FILM COATED ORAL at 08:29

## 2022-01-07 RX ADMIN — IPRATROPIUM BROMIDE AND ALBUTEROL SULFATE 3 ML: .5; 3 SOLUTION RESPIRATORY (INHALATION) at 18:30

## 2022-01-07 RX ADMIN — INSULIN ASPART 2 UNITS: 100 INJECTION, SOLUTION INTRAVENOUS; SUBCUTANEOUS at 17:32

## 2022-01-07 RX ADMIN — SODIUM CHLORIDE TAB 1 GM 1 G: 1 TAB at 08:29

## 2022-01-07 RX ADMIN — POLYETHYLENE GLYCOL 3350 17 G: 17 POWDER, FOR SOLUTION ORAL at 08:29

## 2022-01-07 RX ADMIN — SODIUM CHLORIDE, PRESERVATIVE FREE 10 ML: 5 INJECTION INTRAVENOUS at 20:25

## 2022-01-07 RX ADMIN — IPRATROPIUM BROMIDE AND ALBUTEROL SULFATE 3 ML: .5; 3 SOLUTION RESPIRATORY (INHALATION) at 07:13

## 2022-01-07 RX ADMIN — LOSARTAN POTASSIUM 50 MG: 50 TABLET, FILM COATED ORAL at 17:32

## 2022-01-07 RX ADMIN — DEXTROSE MONOHYDRATE 75 ML/HR: 50 INJECTION, SOLUTION INTRAVENOUS at 06:52

## 2022-01-07 RX ADMIN — LEVOTHYROXINE SODIUM 75 MCG: 0.07 TABLET ORAL at 04:50

## 2022-01-07 RX ADMIN — ASPIRIN 81 MG: 81 TABLET, COATED ORAL at 08:29

## 2022-01-07 RX ADMIN — INSULIN ASPART 3 UNITS: 100 INJECTION, SOLUTION INTRAVENOUS; SUBCUTANEOUS at 11:59

## 2022-01-07 RX ADMIN — HYDRALAZINE HYDROCHLORIDE 10 MG: 20 INJECTION INTRAMUSCULAR; INTRAVENOUS at 22:08

## 2022-01-07 RX ADMIN — HYDROCODONE BITARTRATE AND ACETAMINOPHEN 1 TABLET: 5; 325 TABLET ORAL at 22:08

## 2022-01-07 NOTE — PROGRESS NOTES
Nephrology Progress Note      Subjective     No chest pain or shortness of breath, no headache or visual changes.   Objective       Vital signs :     Temp:  [97.9 °F (36.6 °C)-98.9 °F (37.2 °C)] 98.9 °F (37.2 °C)  Heart Rate:  [67-82] 70  Resp:  [18-20] 18  BP: (149-188)/(55-86) 167/62      Intake/Output Summary (Last 24 hours) at 1/8/2022 1031  Last data filed at 1/8/2022 0300  Gross per 24 hour   Intake 500 ml   Output 1575 ml   Net -1075 ml       Physical Exam:    General Appearance : not in acute distress  Lungs : clear to auscultation, respirations regular  Heart :  regular rhythm & normal rate, normal S1, S2 and no murmur, no rub  Abdomen : normal bowel sounds, no masses, no hepatomegaly, no splenomegaly, soft non-tender and no guarding  Extremities : moves extremities well, no edema, no cyanosis and no redness  Neurologic :  Unable to assess., not fully oriented      Laboratory Data :     Albumin No results found for: ALBUMIN   Magnesium No results found for: MG       PTH               No results found for: PTH    CBC and coagulation:  Results from last 7 days   Lab Units 01/08/22  0507 01/07/22  0458 01/06/22  0012 01/03/22  0538 01/02/22  1310 01/02/22  1229 01/01/22  2231 01/01/22  2229   0000   LACTATE mmol/L  --   --   --   --   --   --  1.5  --   --    SED RATE mm/hr  --   --   --   --  11  --   --  26*  --    CRP mg/dL 1.90* 3.56* 6.33*   < >  --    < >  --  <0.30  --    WBC 10*3/mm3 8.86 8.26 8.19   < > 13.22*  --   --  13.06*   < >   HEMOGLOBIN g/dL 9.6* 9.1* 7.9*   < > 10.3*  --   --  10.8*   < >   HEMATOCRIT % 30.1* 28.6* 24.4*   < > 31.0*  --   --  32.3*   < >   MCV fL 97.7* 99.3* 97.2*   < > 93.9  --   --  93.6   < >   MCHC g/dL 31.9 31.8 32.4   < > 33.2  --   --  33.4   < >   PLATELETS 10*3/mm3 215 219 177   < > 148  --   --  175   < >   INR   --   --   --   --   --   --   --  0.92  --     < > = values in this interval not displayed.     Acid/base balance:  Results from last 7 days   Lab Units  01/05/22  1417 01/01/22  2306   PH, ARTERIAL pH units 7.420 7.409   PO2 ART mm Hg 82.2* 68.6*   PCO2, ARTERIAL mm Hg 35.9 31.8*   HCO3 ART mmol/L 23.2 20.0     Renal and electrolytes:  Results from last 7 days   Lab Units 01/08/22  0507 01/07/22  0458 01/06/22  0801 01/06/22  0012 01/05/22  2005 01/05/22  0420 01/05/22  0031 01/05/22  0031 01/04/22  1221 01/04/22  0803 01/02/22  0135 01/01/22  2229   SODIUM mmol/L 136 137 134* 131* 130*   < >  --  126*   < > 124*   < > 114*   POTASSIUM mmol/L 3.2* 3.7  --  3.9  --   --    < > 4.3   < > 4.3   < > 3.8   MAGNESIUM mg/dL  --   --   --   --   --   --   --   --   --   --   --  1.8   CHLORIDE mmol/L 102 105  --  100  --   --    < > 96*   < > 95*   < > 82*   CO2 mmol/L 22.7 23.7  --  22.2  --   --    < > 20.5*   < > 17.0*   < > 18.5*   BUN mg/dL 18 22  --  27*  --   --    < > 24*   < > 21   < > 24*   CREATININE mg/dL 1.15 1.28*  --  1.46*  --   --   --  1.34*  --  1.38*   < > 1.59*   EGFR IF NONAFRICN AM mL/min/1.73 60* 53*  --  46*  --   --    < > 51*   < > 49*   < > 41*   CALCIUM mg/dL 9.3 9.2  --  8.9  --   --    < > 8.6   < > 9.0   < > 9.7    < > = values in this interval not displayed.     Estimated Creatinine Clearance: 48.3 mL/min (by C-G formula based on SCr of 1.15 mg/dL).    Liver and pancreatic function:  Results from last 7 days   Lab Units 01/03/22  0538 01/01/22  2315 01/01/22  2229   ALBUMIN g/dL 3.69  --  4.26   BILIRUBIN mg/dL 0.3  --  0.5   ALK PHOS U/L 67  --  89   AST (SGOT) U/L 49*  --  37   ALT (SGPT) U/L 23  --  22   AMMONIA umol/L  --  17  --          Cardiac:  Results from last 7 days   Lab Units 01/02/22  0135   PROBNP pg/mL 413.0     Liver and pancreatic function:  Results from last 7 days   Lab Units 01/03/22  0538 01/01/22  2315 01/01/22  2229   ALBUMIN g/dL 3.69  --  4.26   BILIRUBIN mg/dL 0.3  --  0.5   ALK PHOS U/L 67  --  89   AST (SGOT) U/L 49*  --  37   ALT (SGPT) U/L 23  --  22   AMMONIA umol/L  --  17  --        Medications :      aspirin, 81 mg, Oral, Daily  aztreonam, 2 g, Intravenous, Q8H  carvedilol, 6.25 mg, Oral, BID With Meals  insulin aspart, 0-7 Units, Subcutaneous, TID AC  levothyroxine, 75 mcg, Oral, Q AM  losartan, 50 mg, Oral, Q24H  metroNIDAZOLE, 500 mg, Intravenous, Q8H  polyethylene glycol, 17 g, Oral, Daily  rosuvastatin, 10 mg, Oral, Nightly  senna-docusate sodium, 1 tablet, Oral, Daily  sodium chloride, 10 mL, Intravenous, Q12H             Assessment/Plan     1.  Presumed chronic hyponatremia, hypoosmolar  2.  Metabolic encephalopathy  3.  Lung mass  4.  Essential Hypertension  5. 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 Type 2 diabetes    Sodium has improved and normalized. As stated previously, hyponatremia likely was due to SIADH HCTZ induced and polydipsia and likely does not need long term salt tab. Will DC and continue on FR 2L/day. HCTZ should not be resumed on discharge.   I will sign off, please call if any questions. Follow up in renal clinic in 2 wks after discharge.       Maryann Armas MD  01/08/22  10:31 EST

## 2022-01-07 NOTE — PLAN OF CARE
Patient sleeping in bed comfortably. Tolerating oxygen at 2LNC well. No s/s of any acute distress. VSS. Pleasantly confused. Bed alarm on. HOB elevated. Call bell in reach. Last Na level was 134. Will continue to follow the POC.

## 2022-01-07 NOTE — PROGRESS NOTES
Chief Complaint: Shortness of breath    Subjective   Overnight events reviewed.  Seen in bed.  Not in any distress      Review of Systems:   Positive for fatigue otherwise negative        Vital Signs  Temp:  [97.7 °F (36.5 °C)-98.7 °F (37.1 °C)] 97.9 °F (36.6 °C)  Heart Rate:  [65-75] 75  Resp:  [20-22] 20  BP: (137-178)/() 178/100  Body mass index is 31.96 kg/m².    Intake/Output Summary (Last 24 hours) at 1/7/2022 0846  Last data filed at 1/7/2022 0300  Gross per 24 hour   Intake 600 ml   Output 2600 ml   Net -2000 ml     No intake/output data recorded.    Physical Exam:  General-, elderly, not in any acute distress    HEENT- pupils equally reactive to light, normal in size, no scleral icterus    Neck-supple    Respiratory-respirations normal-on auscultation no wheezing no crackles,     Cardiovascular-  Normal S1 and S2. No S3, S4 or murmurs. No JVD, no carotid bruit and no edema, pulses normal bilaterally     GI-nontender nondistended bowel sounds positive    CNS-nonfocal    Musculoskeletal -no edema  Extremities- no obvious deformity noticed     Psychiatric-mood good, good eye contact, alert awake oriented  Skin- no visible rash                 Results Review:     I reviewed the patient's new clinical results.  Results from last 7 days   Lab Units 01/07/22  0458 01/06/22  0012 01/05/22  0031   WBC 10*3/mm3 8.26 8.19 10.16   HEMOGLOBIN g/dL 9.1* 7.9* 8.3*   PLATELETS 10*3/mm3 219 177 142     Results from last 7 days   Lab Units 01/07/22  0458 01/06/22  0801 01/06/22  0012 01/05/22  0420 01/05/22  0031 01/02/22  0135 01/01/22  2229   SODIUM mmol/L 137 134* 131*   < > 126*   < > 114*   POTASSIUM mmol/L 3.7  --  3.9  --  4.3   < > 3.8   CHLORIDE mmol/L 105  --  100  --  96*   < > 82*   CO2 mmol/L 23.7  --  22.2  --  20.5*   < > 18.5*   BUN mg/dL 22  --  27*  --  24*   < > 24*   CREATININE mg/dL 1.28*  --  1.46*  --  1.34*   < > 1.59*   CALCIUM mg/dL 9.2  --  8.9  --  8.6   < > 9.7   GLUCOSE mg/dL 107*   --  100*  --  127*   < > 164*   MAGNESIUM mg/dL  --   --   --   --   --   --  1.8    < > = values in this interval not displayed.     Lab Results   Component Value Date    INR 0.92 01/01/2022    PROTIME 12.8 01/01/2022     Results from last 7 days   Lab Units 01/03/22  0538 01/01/22  2229   ALK PHOS U/L 67 89   BILIRUBIN mg/dL 0.3 0.5   ALT (SGPT) U/L 23 22   AST (SGOT) U/L 49* 37     Results from last 7 days   Lab Units 01/05/22  1417   PH, ARTERIAL pH units 7.420   PO2 ART mm Hg 82.2*   PCO2, ARTERIAL mm Hg 35.9   HCO3 ART mmol/L 23.2     Imaging Results (Last 24 Hours)       ** No results found for the last 24 hours. **                 aspirin, 81 mg, Oral, Daily  aztreonam, 2 g, Intravenous, Q8H  carvedilol, 6.25 mg, Oral, BID With Meals  insulin aspart, 0-7 Units, Subcutaneous, TID AC  levothyroxine, 75 mcg, Oral, Q AM  metroNIDAZOLE, 500 mg, Intravenous, Q8H  polyethylene glycol, 17 g, Oral, Daily  rosuvastatin, 10 mg, Oral, Nightly  senna-docusate sodium, 1 tablet, Oral, Daily  sodium chloride, 10 mL, Intravenous, Q12H  sodium chloride, 1 g, Oral, TID With Meals      dextrose, 75 mL/hr, Last Rate: 75 mL/hr (01/07/22 0652)        Medication Review:     Assessment/Plan     Patient Active Problem List   Diagnosis Code   • CKD (chronic kidney disease) stage 3, GFR 30-59 ml/min ([facility]) N18.30   • 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  Plan discussed  TECHNIQUE:  CT imaging of the chest ordered without IV contrast. Radiation dose reduction techniques included automated exposure control. Radiation audit for CT and nuclear cardiology exams in the last 12 months: 0.     FINDINGS:  Mild patchy infiltrates in both lung bases along with basilar atelectasis. The nondependent portions of both lungs are clear.     Prior transcatheter aortic valve replacement. CABG. Mild cardiomegaly. No pericardial effusion. Normal caliber thoracic  aorta.     There is a suspicious irregular mass in the posterior left costophrenic angle measuring 2.2 x 2.0 cm. Malignancy is not excluded. Comparison with any available prior outside studies is recommended.     Small hiatal hernia with mild thoracic esophageal dilatation.     IMPRESSION:  1.  Mild infiltrate and/or atelectasis in the dependent posterior lung bases.  2.  Mild cardiomegaly. CABG. TAVR.  3.  Small suspicious irregular mass in the posterior left costophrenic angle. This measures up to 2.2 cm. Recommend short interval follow-up chest CT in 2-3 months. This persists, a follow-up PET/CT examination is recommended.        C     CT chest in 6 to 8 weeks.  Follow-up with pulmonary on the outpatient basis     Hyponatremia-improving continue management as per primary team and nephrology.  Now within normal limits  And the notes reviewed.     Episode of aspiration-continue aspiration precautions     latest microbiology reviewed.  Continue current antibiotics.      Cosme Stanley MD  01/07/22  08:46 EST

## 2022-01-07 NOTE — NURSING NOTE
Patient's daughter and POA Naima Lawson called and asked not to wake the patient up for any vital signs and lab work before 5 am.  The CNA and labs notified of the POA's wishes.

## 2022-01-07 NOTE — PROGRESS NOTES
LOS: 5 days     Name: Feroz Canales  Age/Sex: 86 y.o. male  :  1935        PCP: Provider, No Known  REF: No ref. provider found    Principal Problem:    Hyponatremia      Reason for follow-up: Congestive heart failure    Subjective       Subjective     Feroz Canales is a 86 year old male with a past medical history significant for congestive heart failure, diabetes mellitus type 2, chronic kidney disease, BPH, severe aortic stenosis status post TAVR, essential hypertension and coronary artery disease status post CABG. Patient presented to the ER with complaints of altered mental status and abdominal pain    Interval History: Patient reports he is doing ok today. Kidney function and sodium improved. Denies any shortness of breath or chest pain. Blood pressure elevated. Echocardiogram showed normal LV function with mild to moderate aortic valve stenosis and moderate mitral valve stenosis.     Vital Signs  Temp:  [97.7 °F (36.5 °C)-98.7 °F (37.1 °C)] 97.9 °F (36.6 °C)  Heart Rate:  [65-75] 75  Resp:  [-22] 20  BP: (137-178)/() 178/100     Vital Signs (last 72 hrs)        0700   0659  0700   0659  07 0659  07 0946   Most Recent      Temp (°F) 97.6 -  98.9    98.1 -  98.7    97.7 -  98.7       97.9 (36.6)  0537    Heart Rate 68 -  90    70 -  82    65 -  73      75     75  0829    Resp 18 -  22    18 -  22    20 -  22       20  0537    /68 -  149/60    145/57 -  160/73    137/50 -  177/67      178/100     178/100  0829    SpO2 (%) 95 -  99    96 -  99    96 -  98       96  1830        Documented weights    22 2249 22 0002 22 0403 22 0500   Weight: 86.2 kg (190 lb) 92 kg (202 lb 14.4 oz) 91.8 kg (202 lb 6.4 oz) 97.3 kg (214 lb 8 oz)    22 0500 22 0500 22 0529   Weight: 96.4 kg (212 lb 8 oz) 94.1 kg (207 lb 8 oz) 89.8 kg (198 lb)      Body mass index is 31.96 kg/m².    Intake/Output  Summary (Last 24 hours) at 1/7/2022 0946  Last data filed at 1/7/2022 0300  Gross per 24 hour   Intake 600 ml   Output 1000 ml   Net -400 ml     Objective    Objective       Physical Exam:     General Appearance:    Alert, cooperative, in no acute distress   Head:    Normocephalic, without obvious abnormality, atraumatic   Eyes:            Conjunctivae and sclerae normal, no   icterus, no pallor, corneas clear.   Neck:   No adenopathy, supple, trachea midline, no thyromegaly, no   carotid bruit, no JVD   Lungs:     Clear to auscultation,respirations regular, even and                  unlabored    Heart:    Regular rhythm and normal rate, normal S1 and S2, no            murmur, no gallop, no rub, no click   Chest Wall:    No abnormalities observed   Abdomen:     Normal bowel sounds, no masses, no organomegaly, soft        non-tender, non-distended, no guarding, no rebound                tenderness   Extremities:   Moves all extremities well, no edema, no cyanosis, no             redness   Pulses:   Pulses palpable and equal bilaterally   Skin:   No bleeding, bruising or rash       Neurologic:   Alert with intermittent confusion      Results review       Results Review:   Results from last 7 days   Lab Units 01/07/22  0458 01/06/22  0012 01/05/22  0031 01/04/22  0803 01/03/22  0538 01/02/22  1310 01/01/22  2229   WBC 10*3/mm3 8.26 8.19 10.16 12.26* 12.55* 13.22* 13.06*   HEMOGLOBIN g/dL 9.1* 7.9* 8.3* 9.6* 9.6* 10.3* 10.8*   PLATELETS 10*3/mm3 219 177 142 156 164 148 175     Results from last 7 days   Lab Units 01/07/22  0458 01/06/22  0801 01/06/22  0012 01/05/22  2005 01/05/22  1639 01/05/22  1356 01/05/22  0749 01/05/22  0420 01/05/22  0031 01/04/22  1221 01/04/22  0803 01/03/22  1216 01/03/22  0538 01/02/22  2334 01/02/22  1843 01/02/22  1229 01/02/22  1229 01/02/22  0135 01/01/22  2229   SODIUM mmol/L 137 134* 131* 130* 126* 129* 124*   < > 126*   < > 124*   < > 121*  121*   < > 122*   < > 122*   < > 114*    POTASSIUM mmol/L 3.7  --  3.9  --   --   --   --   --  4.3  --  4.3  --  4.1  4.1  --  4.1  --  4.2   < > 3.8   CHLORIDE mmol/L 105  --  100  --   --   --   --   --  96*  --  95*  --  92*  92*  --  89*  --  90*   < > 82*   CO2 mmol/L 23.7  --  22.2  --   --   --   --   --  20.5*  --  17.0*  --  19.6*  19.6*  --  21.0*  --  22.7   < > 18.5*   BUN mg/dL 22  --  27*  --   --   --   --   --  24*  --  21  --  18  18  --  18  --  20   < > 24*   CREATININE mg/dL 1.28*  --  1.46*  --   --   --   --   --  1.34*  --  1.38*  --  1.46*  1.46*  --  1.42*  --  1.50*   < > 1.59*   CALCIUM mg/dL 9.2  --  8.9  --   --   --   --   --  8.6  --  9.0  --  9.0  9.0  --  9.2  --  9.3   < > 9.7   GLUCOSE mg/dL 107*  --  100*  --   --   --   --   --  127*  --  122*  --  116*  116*  --  136*  --  120*   < > 164*   ALT (SGPT) U/L  --   --   --   --   --   --   --   --   --   --   --   --  23  --   --   --   --   --  22   AST (SGOT) U/L  --   --   --   --   --   --   --   --   --   --   --   --  49*  --   --   --   --   --  37    < > = values in this interval not displayed.     Results from last 7 days   Lab Units 01/01/22  2229   CK TOTAL U/L 345*   TROPONIN T ng/mL 0.013     Lab Results   Component Value Date    INR 0.92 01/01/2022     Lab Results   Component Value Date    MG 1.8 01/01/2022     Lab Results   Component Value Date    TSH 1.960 01/02/2022    CHLPL 165 04/17/2014    TRIG 101 01/06/2022    HDL 40 01/06/2022    LDL 31 01/06/2022      Imaging Results (Last 48 Hours)     Procedure Component Value Units Date/Time    XR Chest 1 View [[id number]] Collected: 01/05/22 1230     Updated: 01/05/22 1233    Narrative:      EXAM:    XR Chest, 1 View     EXAM DATE:    1/5/2022 9:29 AM     CLINICAL HISTORY:    soa; E87.3-[id number] and hyponatremia     TECHNIQUE:    Frontal view of the chest.     COMPARISON:    01/03/2022     FINDINGS:    LUNGS:  Again is patchy bibasilar scattered airspace disease.    PLEURAL SPACE:  Probably tiny  pleural effusions.  No pneumothorax.    HEART:  Heart size is stable.    MEDIASTINUM:  Unremarkable.    BONES/JOINTS:  Unremarkable.       Impression:      1.  Again is patchy bibasilar scattered airspace disease.  2.  Probably tiny pleural effusions.     This report was finalized on 1/5/2022 12:31 PM by Dr. Brayden Davis MD.           Lab Results   Component Value Date     (H) 08/26/2015      I reviewed the patient's new clinical results.    Telemetry: NSR 60's      Medication Review:   aspirin, 81 mg, Oral, Daily  aztreonam, 2 g, Intravenous, Q8H  carvedilol, 6.25 mg, Oral, BID With Meals  insulin aspart, 0-7 Units, Subcutaneous, TID AC  levothyroxine, 75 mcg, Oral, Q AM  metroNIDAZOLE, 500 mg, Intravenous, Q8H  polyethylene glycol, 17 g, Oral, Daily  rosuvastatin, 10 mg, Oral, Nightly  senna-docusate sodium, 1 tablet, Oral, Daily  sodium chloride, 10 mL, Intravenous, Q12H        dextrose, 75 mL/hr, Last Rate: 75 mL/hr (01/07/22 0652)        Assessment      Assessment:  1. ASCVD, status post CABG in 2005, appears clinically stable  2. History of TAVR in 2017 for severe aortic stenosis, clinically asymptomatic and stable  3. Severe hyponatremia on admission, resolved  4. Chronic diastolic congestive heart failure, appears compensated  5. Chronic kidney disease, creatinine improved 1.28  6. Benign essential hypertension    Plan     1.  Continue aspirin, statin and carvedilol for history of ASCVD.  2.  As CHF is compensated, will hold Bumex and spironolactone which she was taking at home to avoid electrolyte disturbances..  LVEF is normal.  3.  Continue carvedilol and resumed losartan for history of hypertension.    I discussed the patients findings and my recommendations with patient and family      Electronically signed by NAHOMI Kelley, 01/07/22, 9:46 AM EST.    Please note that portions of this note were completed with a voice recognition program.

## 2022-01-07 NOTE — CASE MANAGEMENT/SOCIAL WORK
Discharge Planning Assessment   Eduardo     Patient Name: Feroz Canales  MRN: [id number]  Today's Date: 1/7/2022    Admit Date: 1/1/2022       Discharge Plan     Row Name 01/07/22 1628       Plan    Plan Pt admitted on 1/2/22.  Pt lives at home alone with family residing below him in the same buliding.  Pt currently does not utilize home health services.  Pt family request home health at discharge.  Pt family stated no preference of home health agency if ordered.  Pt resides at [address] [place] [address].  Pt currently utilizes hospital bed, cpap, shower chair, lift chair, bedside commode and rollator via [facility].  SS will follow.                    JEREMIAH Gould

## 2022-01-07 NOTE — PROGRESS NOTES
[facility] HOSPITALIST PROGRESS NOTE     Patient Identification:  Name:  Feroz Canales  Age:  86 y.o.  Sex:  male  :  1935  MRN:  [id number]  Visit Number:  [id number]  ROOM: [address] [place] [address]     Primary Care Provider:  Provider, No Known    Length of stay in inpatient status:  5    Subjective     Chief Compliant:    Chief Complaint   Patient presents with   • Altered Mental Status   • Vomiting     History of Presenting Illness:    Patient remains ill but stable, no acute events overnight, no ne complaints, resting in bed, very appropriate mentation today, Na now NML, continued on Abx, Glc overall remains controlled, Hgb stable and no reported bleeding, urine cleared up, remove stephen today, echo reviewed w/ some recurrent AS and mod MS, cards following, awaiting updated recs, called daughter and discussed care, questions answered. Patient denies any fevers or chills.   Objective     Current Hospital Meds:aspirin, 81 mg, Oral, Daily  aztreonam, 2 g, Intravenous, Q8H  carvedilol, 6.25 mg, Oral, BID With Meals  insulin aspart, 0-7 Units, Subcutaneous, TID AC  levothyroxine, 75 mcg, Oral, Q AM  metroNIDAZOLE, 500 mg, Intravenous, Q8H  polyethylene glycol, 17 g, Oral, Daily  rosuvastatin, 10 mg, Oral, Nightly  senna-docusate sodium, 1 tablet, Oral, Daily  sodium chloride, 10 mL, Intravenous, Q12H    dextrose, 75 mL/hr, Last Rate: 75 mL/hr (22 0652)      Current Antimicrobial Therapy:  Anti-Infectives (From admission, onward)    Ordered     Dose/Rate Route Frequency Start Stop    22 0323  metroNIDAZOLE (FLAGYL) 500 mg/100mL IVPB        Ordering Provider: Isabel Servin DO    500 mg Intravenous Every 8 Hours 22 0800 01/10/22 0759    22 1236  aztreonam (AZACTAM) 2 g in sodium chloride 0.9 % 100 mL IVPB-VTB        Ordering Provider: Chago Alvarenga MD    2 g  over 4 Hours Intravenous Every 8 Hours 22 1921 22 1959    22 1236  aztreonam (AZACTAM) 2 g in sodium  chloride 0.9 % 100 mL IVPB-VTB        Ordering Provider: Nikkie Escamilla PA    2 g  200 mL/hr over 30 Minutes Intravenous Once 01/02/22 1238 01/02/22 1326    01/02/22 0953  vancomycin 1750 mg/500 mL 0.9% NS IVPB ([facility])        Ordering Provider: Seth Nagy MD    20 mg/kg × 86.2 kg  over 2 Hours Intravenous Once 01/02/22 0955 01/02/22 1205    01/02/22 0306  clindamycin (CLEOCIN) 600 mg in dextrose 5% 50 mL IVPB (premix)        Ordering Provider: Avinash Centeno DO    600 mg Intravenous Once 01/02/22 0308 01/02/22 0537        Current Diuretic Therapy:  Diuretics (From admission, onward)    None        ----------------------------------------------------------------------------------------------------------------------  Vital Signs:  Temp:  [97.7 °F (36.5 °C)-98.7 °F (37.1 °C)] 97.9 °F (36.6 °C)  Heart Rate:  [65-76] 69  Resp:  [18-22] 20  BP: (160-178)/() 169/69  SpO2:  [96 %-100 %] 100 %  on  Flow (L/min):  [2] 2;   Device (Oxygen Therapy): nasal cannula  Body mass index is 31.96 kg/m².    Wt Readings from Last 3 Encounters:   01/07/22 89.8 kg (198 lb)     Intake & Output (last 3 days)       01/04 0701 01/05 0700 01/05 0701 01/06 0700 01/06 0701 01/07 0700 01/07 0701 01/08 0700    P.O. 680 540 840     I.V. (mL/kg)  200 (2.1)      Total Intake(mL/kg) 680 (7.1) 740 (7.9) 840 (9.4)     Urine (mL/kg/hr) 1850 (0.8) 3100 (1.4) 2600 (1.2)     Stool 0 0 0     Total Output 1850 3100 2600     Net -1170 -2360 -1760             Stool Unmeasured Occurrence 0 x 1 x 1 x         Diet Soft Texture; Ground; Daily Fluid Restriction, Consistent Carbohydrate, Renal; Other; 2,000  ----------------------------------------------------------------------------------------------------------------------  Physical exam:  Constitutional:  Elderly, No acute distress.      HENT:  Head:  Normocephalic and atraumatic.  Mouth:  Moist mucous membranes.    Eyes:  Conjunctivae and EOM are normal. No scleral icterus.    Neck:   Neck supple.  No JVD present.    Cardiovascular:  Normal rate, regular rhythm and normal heart sounds with no murmur.  Pulmonary/Chest:  No respiratory distress, no wheezes, on 2LNC and stable.  Abdominal:  Soft. No distension and no tenderness.   Musculoskeletal:  No tenderness, and no deformity.     Neurological:  Alert and oriented to person, place.  No gross focal deficits   Skin:  Skin is warm and dry. No rash noted. No pallor.   Peripheral vascular:  No clubbing, no cyanosis, no edema.  : Llamas in place, yellow urine  ----------------------------------------------------------------------------------------------------------------------  Results from last 7 days   Lab Units 01/07/22  0458 01/06/22  0012 01/05/22  0749 01/05/22  0031 01/02/22  1229 01/01/22  2231 01/01/22  2229   CRP mg/dL 3.56* 6.33* 10.66*  --    < >  --  <0.30   LACTATE mmol/L  --   --   --   --   --  1.5  --    WBC 10*3/mm3 8.26 8.19  --  10.16   < >  --  13.06*   HEMOGLOBIN g/dL 9.1* 7.9*  --  8.3*   < >  --  10.8*   HEMATOCRIT % 28.6* 24.4*  --  25.2*   < >  --  32.3*   MCV fL 99.3* 97.2*  --  95.5   < >  --  93.6   MCHC g/dL 31.8 32.4  --  32.9   < >  --  33.4   PLATELETS 10*3/mm3 219 177  --  142   < >  --  175   INR   --   --   --   --   --   --  0.92    < > = values in this interval not displayed.     Results from last 7 days   Lab Units 01/05/22  1417   PH, ARTERIAL pH units 7.420   PO2 ART mm Hg 82.2*   PCO2, ARTERIAL mm Hg 35.9   HCO3 ART mmol/L 23.2     Results from last 7 days   Lab Units 01/07/22  0458 01/06/22  0801 01/06/22  0012 01/05/22  0420 01/05/22  0031 01/03/22  1216 01/03/22  0538 01/02/22  0135 01/01/22  2229   SODIUM mmol/L 137 134* 131*   < > 126*   < > 121*  121*   < > 114*   POTASSIUM mmol/L 3.7  --  3.9  --  4.3   < > 4.1  4.1   < > 3.8   MAGNESIUM mg/dL  --   --   --   --   --   --   --   --  1.8   CHLORIDE mmol/L 105  --  100  --  96*   < > 92*  92*   < > 82*   CO2 mmol/L 23.7  --  22.2  --  20.5*   < >  19.6*  19.6*   < > 18.5*   BUN mg/dL 22  --  27*  --  24*   < > 18  18   < > 24*   CREATININE mg/dL 1.28*  --  1.46*  --  1.34*   < > 1.46*  1.46*   < > 1.59*   EGFR IF NONAFRICN AM mL/min/1.73 53*  --  46*  --  51*   < > 46*  46*   < > 41*   CALCIUM mg/dL 9.2  --  8.9  --  8.6   < > 9.0  9.0   < > 9.7   GLUCOSE mg/dL 107*  --  100*  --  127*   < > 116*  116*   < > 164*   ALBUMIN g/dL  --   --   --   --   --   --  3.69  --  4.26   BILIRUBIN mg/dL  --   --   --   --   --   --  0.3  --  0.5   ALK PHOS U/L  --   --   --   --   --   --  67  --  89   AST (SGOT) U/L  --   --   --   --   --   --  49*  --  37   ALT (SGPT) U/L  --   --   --   --   --   --  23  --  22    < > = values in this interval not displayed.   Estimated Creatinine Clearance: 43.5 mL/min (A) (by C-G formula based on SCr of 1.28 mg/dL (H)).  No results found for: AMMONIA  Results from last 7 days   Lab Units 01/01/22  2229   CK TOTAL U/L 345*   TROPONIN T ng/mL 0.013     Results from last 7 days   Lab Units 01/02/22  0135   PROBNP pg/mL 413.0     Results from last 7 days   Lab Units 01/06/22  0012   CHOLESTEROL mg/dL 90   TRIGLYCERIDES mg/dL 101   HDL CHOL mg/dL 40   LDL CHOL mg/dL 31     Glucose   Date/Time Value Ref Range Status   01/07/2022 1141 214 (H) 70 - 130 mg/dL Final     Comment:     Meter: [id number] : [id number] ASAD MOSS   01/07/2022 0748 116 70 - 130 mg/dL Final     Comment:     Treated Patient Meter: [id number] : [id number] HUY QUAN   01/06/2022 1854 155 (H) 70 - 130 mg/dL Final     Comment:     Meter: [id number] : [id number] MILAN JAMES   01/06/2022 1625 118 70 - 130 mg/dL Final     Comment:     Meter: [id number] : [id number] SUAD BOWLEON   01/06/2022 1025 152 (H) 70 - 130 mg/dL Final     Comment:     Meter: [id number] : [id number] SUAD QUINTEROS   01/06/2022 0648 97 70 - 130 mg/dL Final     Comment:     Meter: [id number] : [id number] SARAVANAN JO   01/05/2022 1942 136 (H) 70 - 130 mg/dL  Final     Comment:     Meter: [id number] : [id number] ANKUSH QUAN   01/05/2022 1748 179 (H) 70 - 130 mg/dL Final     Comment:     Meter: [id number] : [id number] Ton Anderson     Lab Results   Component Value Date    TSH 1.960 01/02/2022     No results found for: PREGTESTUR, PREGSERUM, HCG, HCGQUANT  Pain Management Panel     Pain Management Panel Latest Ref Rng & Units 1/2/2022 6/25/2020    CREATININE UR mg/dL - 56.2    AMPHETAMINES SCREEN, URINE Negative Negative -    BARBITURATES SCREEN Negative Negative -    BENZODIAZEPINE SCREEN, URINE Negative Negative -    BUPRENORPHINEUR Negative Negative -    COCAINE SCREEN, URINE Negative Negative -    METHADONE SCREEN, URINE Negative Negative -    METHAMPHETAMINEUR Negative Negative -        Brief Urine Lab Results  (Last result in the past 365 days)      Color   Clarity   Blood   Leuk Est   Nitrite   Protein   CREAT   Urine HCG        01/02/22 0029 Yellow   Clear   Negative   Negative   Negative   100 mg/dL (2+)               Blood Culture   Date Value Ref Range Status   01/01/2022 No growth at 5 days  Final   01/01/2022 No growth at 5 days  Final     No results found for: URINECX  No results found for: WOUNDCX  No results found for: STOOLCX  No results found for: RESPCX  No results found for: AFBCX  Results from last 7 days   Lab Units 01/07/22  0458 01/06/22  0012 01/05/22  0749 01/02/22  1310 01/02/22  1229 01/01/22  2231 01/01/22  2229   LACTATE mmol/L  --   --   --   --   --  1.5  --    SED RATE mm/hr  --   --   --  11  --   --  26*   CRP mg/dL 3.56* 6.33* 10.66*  --  0.66*  --  <0.30     I have personally looked at the labs and they are summarized above.  ----------------------------------------------------------------------------------------------------------------------  Detailed radiology reports for the last 24 hours:  Imaging Results (Last 24 Hours)     ** No results found for the last 24 hours. **        Assessment & Plan    86M Obese by BMI  PMH CHF (details unknown), prior TAVR, diabetes mellitus, CKD, BPH, and hypertension who presents to the ED complaining of altered mental status and abdominal pain, found to have severe hyponatremia, witnessed aspiration with new pneumonia.    #Sepsis, Acute Metabolic Encephalopathy & Acute Hypoxic Respiratory Failure 2/2 PNA, Bacterial, treating for MDR Organisms/Aspiration  - Patient presented w/ confusion, HR > 90, RR > 20, WBC count > 12K, PNA on imaging.  - Admission labs showed WBC count 13K, CRP < 0.3 -> 0.66, lactate 1.5, Covid/flu negative, UA benign, Bcx's NGTD  - CTPE showed No PE, mild infiltrate and/or atelectasis in dependent portions posterior lung bases  - SLP consulted; Following, no noted aspiration  - Continue Azactam and Flagyl x 7 days  - Continue APAP PRN for fevers, Duonebs PRN  - Monitor on telemetry, continue 02 but wean as able    #Electrolyte Abnormalities  - Borderline Hypomagnesemia - Mg 1.8 on admission, Replaced per protocol  - Acute vs Chronic Hyponatremia - Na 114 on admission, Holding home HCTZ and Bumex, Nephrology consulted and following, suspect SIADH, added salt tabs, on 1.5L fluid restriction, today is normal.    #HTN/HLD/Non-obstructive CAD and chronic LBBB  #Chronic CHF w/ unknown EF  #Hx AS s/p TAVR w/ recurrent mild-mod AS  #Mod MS  - Labs showed trops 0.013, proBNP 413  - EKG showed NSR, LBBB  - Echo LVEF 61-65%, mild concentric LVH, mild LA volume increase, mild-mod AS valve area 1.2cm^2 w/ mean 6mmHg, mod MS w/ valve area 1.1cm^2 and mean gradient 4mmHg  - CT showed mild cardiomegaly, prior CABG, prior TAVR  - Cards consulted; Following  - Continue home , statin  - Continue home Coreg  - Holding home ARB due to sepsis, resume as indicated  - Holding home Amlodipine, Hydralazine, Spironolactone due to sepsis, resume as indicated  - Holding home Bumex as per above, resume as indicated  - Continue to monitor on tele, strict I/O's, trend HR and BP    #Prominent b/l  "lateral and third ventricles c/f NPH  - CT Head showed prominence lateral and third ventricles b/l suggestive of NPH, fourth ventricle normal, patient walks w/ walker at home, has reported \"dribbling\" of urine per daughter, patient's daughter has deferred LP as recommended by ER and by myself to this point.  - MRI showed ventriculomegaly, no evidence of infarct  - Consult TeleNeurology; Rec'd outpatient evaluation by movement disorder specialist and will refer upon discharge    #Irregular Spiculated Posterior L Costophrenic Angle Mass  - CT Chest showed small suspicious irregular mass posterior L costophrenic angle, measures 2.2cm, rec'd serial f/u 2-3m w/ Chest CT and if persists PET thereafter  - Pulm consulted; Rec'd repeat CT Chest 2-3 months, consider PET at that time, f/u outpatient pulm closer to home    #CKDIII  - Patient presented w/ Cr 1.59, b/l around 1.3-1.5, has remained w/in baseline  - Trend Cr and UOP, avoid nephrotoxins, NSAIDs, dehydration and contrast as able.     #IDDM Type II, controlled, unknown complications  - Hgb A1c = 5.9%  - No home oral agents, continue FSBG and SSI, holding home levemir for now, resume as indicated.    #Hematuria   - Noted redish urine in stephen 1/4, worse 1/6, Hgb improved to 9.1 today, d/c'd SQH previously, trending    #Hypothyroid  - Continue home Levothyroxine    #Chronic Pain  - Reviewed MARCUS, holding home gabapentin for now due to confusion    #Advanced Age/Age Related Debility  - Supportive Care, Consult PT/OT    #Obesity by BMI  - BMI 34, complicates all aspects of care     F: Oral  E: Monitor & Replace PRN  N: Soft Texture per daughter request  Ppx: SCDs  Code: Full Code     Dispo: Pending workup and clinical improvement     *This patient is considered high risk due to sepsis, acute respiratory failure, PNA, acute encephalopathy, spiculated lung mass, ventriculomegaly, severe hyponatremia.    VTE Prophylaxis:   Mechanical Order History:      Ordered        " 01/05/22 1250  Place Sequential Compression Device  Once            01/05/22 1250  Maintain Sequential Compression Device  Continuous                    Pharmalogical Order History:      Ordered     Dose Route Frequency Stop    01/02/22 1999  heparin (porcine) 5000 UNIT/ML injection 5,000 Units  Status:  Discontinued         5,000 Units SC Every 12 Hours Scheduled 01/05/22 1250              Chago Alvarenga MD  [facility] Hospitalist  01/07/22  11:55 EST

## 2022-01-07 NOTE — PLAN OF CARE
Goal Outcome Evaluation:  Plan of Care Reviewed With: patient           Outcome Summary: Pt resting in bed. Pt voices no complaints or concerns at this time. Pt currently on room air; oxygen saturation above 95%. Pt ambulated approximately 60 ft in room and sat up in chair for meals; tolerated well. No s/s of acute distress noted. Will continue to follow plan of care.

## 2022-01-08 VITALS
SYSTOLIC BLOOD PRESSURE: 129 MMHG | BODY MASS INDEX: 31.68 KG/M2 | HEIGHT: 66 IN | DIASTOLIC BLOOD PRESSURE: 53 MMHG | OXYGEN SATURATION: 96 % | HEART RATE: 64 BPM | WEIGHT: 197.1 LBS | TEMPERATURE: 98 F | RESPIRATION RATE: 18 BRPM

## 2022-01-08 LAB
ANION GAP SERPL CALCULATED.3IONS-SCNC: 11.3 MMOL/L (ref 5–15)
BASOPHILS # BLD AUTO: 0.1 10*3/MM3 (ref 0–0.2)
BASOPHILS NFR BLD AUTO: 1.1 % (ref 0–1.5)
BUN SERPL-MCNC: 18 MG/DL (ref 8–23)
BUN/CREAT SERPL: 15.7 (ref 7–25)
CALCIUM SPEC-SCNC: 9.3 MG/DL (ref 8.6–10.5)
CHLORIDE SERPL-SCNC: 102 MMOL/L (ref 98–107)
CO2 SERPL-SCNC: 22.7 MMOL/L (ref 22–29)
CREAT SERPL-MCNC: 1.15 MG/DL (ref 0.76–1.27)
CRP SERPL-MCNC: 1.9 MG/DL (ref 0–0.5)
DEPRECATED RDW RBC AUTO: 52.3 FL (ref 37–54)
EOSINOPHIL # BLD AUTO: 0.7 10*3/MM3 (ref 0–0.4)
EOSINOPHIL NFR BLD AUTO: 7.9 % (ref 0.3–6.2)
ERYTHROCYTE [DISTWIDTH] IN BLOOD BY AUTOMATED COUNT: 14.9 % (ref 12.3–15.4)
GFR SERPL CREATININE-BSD FRML MDRD: 60 ML/MIN/1.73
GLUCOSE BLDC GLUCOMTR-MCNC: 106 MG/DL (ref 70–130)
GLUCOSE BLDC GLUCOMTR-MCNC: 185 MG/DL (ref 70–130)
GLUCOSE SERPL-MCNC: 108 MG/DL (ref 65–99)
HCT VFR BLD AUTO: 30.1 % (ref 37.5–51)
HGB BLD-MCNC: 9.6 G/DL (ref 13–17.7)
IMM GRANULOCYTES # BLD AUTO: 0.26 10*3/MM3 (ref 0–0.05)
IMM GRANULOCYTES NFR BLD AUTO: 2.9 % (ref 0–0.5)
LYMPHOCYTES # BLD AUTO: 1.39 10*3/MM3 (ref 0.7–3.1)
LYMPHOCYTES NFR BLD AUTO: 15.7 % (ref 19.6–45.3)
MCH RBC QN AUTO: 31.2 PG (ref 26.6–33)
MCHC RBC AUTO-ENTMCNC: 31.9 G/DL (ref 31.5–35.7)
MCV RBC AUTO: 97.7 FL (ref 79–97)
MONOCYTES # BLD AUTO: 1.12 10*3/MM3 (ref 0.1–0.9)
MONOCYTES NFR BLD AUTO: 12.6 % (ref 5–12)
NEUTROPHILS NFR BLD AUTO: 5.29 10*3/MM3 (ref 1.7–7)
NEUTROPHILS NFR BLD AUTO: 59.8 % (ref 42.7–76)
NRBC BLD AUTO-RTO: 0 /100 WBC (ref 0–0.2)
PLATELET # BLD AUTO: 215 10*3/MM3 (ref 140–450)
PMV BLD AUTO: 10.1 FL (ref 6–12)
POTASSIUM SERPL-SCNC: 3.2 MMOL/L (ref 3.5–5.2)
RBC # BLD AUTO: 3.08 10*6/MM3 (ref 4.14–5.8)
SODIUM SERPL-SCNC: 136 MMOL/L (ref 136–145)
WBC NRBC COR # BLD: 8.86 10*3/MM3 (ref 3.4–10.8)

## 2022-01-08 PROCEDURE — 86140 C-REACTIVE PROTEIN: CPT | Performed by: INTERNAL MEDICINE

## 2022-01-08 PROCEDURE — 94799 UNLISTED PULMONARY SVC/PX: CPT

## 2022-01-08 PROCEDURE — 99239 HOSP IP/OBS DSCHRG MGMT >30: CPT | Performed by: INTERNAL MEDICINE

## 2022-01-08 PROCEDURE — 82962 GLUCOSE BLOOD TEST: CPT

## 2022-01-08 PROCEDURE — 85025 COMPLETE CBC W/AUTO DIFF WBC: CPT | Performed by: INTERNAL MEDICINE

## 2022-01-08 PROCEDURE — 80048 BASIC METABOLIC PNL TOTAL CA: CPT | Performed by: INTERNAL MEDICINE

## 2022-01-08 PROCEDURE — 63710000001 INSULIN ASPART PER 5 UNITS: Performed by: INTERNAL MEDICINE

## 2022-01-08 RX ORDER — METRONIDAZOLE 250 MG/1
500 TABLET ORAL EVERY 8 HOURS SCHEDULED
Status: DISCONTINUED | OUTPATIENT
Start: 2022-01-08 | End: 2022-01-08

## 2022-01-08 RX ORDER — METRONIDAZOLE 500 MG/1
500 TABLET ORAL 3 TIMES DAILY
Qty: 4 TABLET | Refills: 0 | Status: SHIPPED | OUTPATIENT
Start: 2022-01-08 | End: 2022-01-10

## 2022-01-08 RX ORDER — ROSUVASTATIN CALCIUM 10 MG/1
10 TABLET, COATED ORAL NIGHTLY
Qty: 30 TABLET | Refills: 0 | Status: SHIPPED | OUTPATIENT
Start: 2022-01-08

## 2022-01-08 RX ORDER — TAMSULOSIN HYDROCHLORIDE 0.4 MG/1
1 CAPSULE ORAL DAILY
Qty: 30 CAPSULE | Refills: 0 | Status: SHIPPED | OUTPATIENT
Start: 2022-01-08

## 2022-01-08 RX ORDER — PEN NEEDLE, DIABETIC 30 GX3/16"
1 NEEDLE, DISPOSABLE MISCELLANEOUS
Qty: 100 EACH | Refills: 0 | Status: SHIPPED | OUTPATIENT
Start: 2022-01-08

## 2022-01-08 RX ORDER — NICOTINE 21 MG/24HR
1 PATCH, TRANSDERMAL 24 HOURS TRANSDERMAL EVERY 24 HOURS
Status: DISCONTINUED | OUTPATIENT
Start: 2022-01-08 | End: 2022-01-08

## 2022-01-08 RX ADMIN — METRONIDAZOLE 500 MG: 500 INJECTION, SOLUTION INTRAVENOUS at 00:14

## 2022-01-08 RX ADMIN — ASPIRIN 81 MG: 81 TABLET, COATED ORAL at 08:23

## 2022-01-08 RX ADMIN — SODIUM CHLORIDE 2 G: 9 INJECTION, SOLUTION INTRAVENOUS at 05:57

## 2022-01-08 RX ADMIN — DOCUSATE SODIUM 50 MG AND SENNOSIDES 8.6 MG 1 TABLET: 8.6; 5 TABLET, FILM COATED ORAL at 08:23

## 2022-01-08 RX ADMIN — POLYETHYLENE GLYCOL 3350 17 G: 17 POWDER, FOR SOLUTION ORAL at 08:23

## 2022-01-08 RX ADMIN — LEVOTHYROXINE SODIUM 75 MCG: 0.07 TABLET ORAL at 05:57

## 2022-01-08 RX ADMIN — CARVEDILOL 6.25 MG: 6.25 TABLET, FILM COATED ORAL at 08:23

## 2022-01-08 RX ADMIN — SODIUM CHLORIDE 2 G: 9 INJECTION, SOLUTION INTRAVENOUS at 11:17

## 2022-01-08 RX ADMIN — INSULIN ASPART 2 UNITS: 100 INJECTION, SOLUTION INTRAVENOUS; SUBCUTANEOUS at 11:18

## 2022-01-08 RX ADMIN — LOSARTAN POTASSIUM 50 MG: 50 TABLET, FILM COATED ORAL at 08:23

## 2022-01-08 RX ADMIN — METRONIDAZOLE 500 MG: 500 INJECTION, SOLUTION INTRAVENOUS at 10:09

## 2022-01-08 RX ADMIN — SODIUM CHLORIDE, PRESERVATIVE FREE 10 ML: 5 INJECTION INTRAVENOUS at 08:23

## 2022-01-08 NOTE — NURSING NOTE
"Pt ambulated on RA with x2 assistance, walker, and gait belt. Oxygen saturation remained in the 90s, with the lowest being 90%. Pt tolerated well. Per MD Alvarenga, pt to finish scheduled IV Flagyl and IV Azactam prior to discharge. Pt's daughter stated that due to inclement weather, roads to pt home were \"extremely hazardous\" and wished to have discharge completed before 1500. Flagyl was switched to PO. Azactam was not able to be re-timed per Pharmacy. Pt and  Daughter agreeable with deferment of Azactam dose. MD Alvarenga aware.   "

## 2022-01-08 NOTE — PROGRESS NOTES
Pharmacy was consulted for smoking cessation.  His chart said he has never been a smoker and I confirmed this with his daughter also.    Thank You;  Brittany Christie, PharmD  01/08/22  13:13 EST

## 2022-01-08 NOTE — DISCHARGE SUMMARY
[facility]    Patient Identification:  Name:  Feroz Canales  Age:  86 y.o.  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  Irregular Spiculated Posterior L Costophrenic Angle Mass  Borderline Hypomagnesemia - Resolved  HTN  HLD  Non-obstructive CAD and chronic LBBB  Chronic HFpEF  Hx AS s/p TAVR w/ recurrent mild-mod AS  Mod MS  CKDIII  IDDM Type II, controlled, unknown complications  Hematuria - Resolved  Hypothyroid  BPH  Chronic Pain  Advanced Age/Age Related Debility  Obesity by BMI    CONSULTS   Nephrology  Cardiology  Pulmonary  TeleNeur    PROCEDURES PERFORMED  None    HOSPITAL COURSE  86M Obese by BMI PMH CHF (details unknown), prior TAVR, diabetes mellitus, CKD, BPH, and hypertension who presents to the ED complaining of altered mental status and abdominal pain, found to have severe hyponatremia, witnessed aspiration with new pneumonia.    #Sepsis, Acute Metabolic Encephalopathy & Acute Hypoxic Respiratory Failure 2/2 PNA, Bacterial, treating for MDR Organisms/Aspiration  Patient presented with confusion, HR > 90, RR > 20, WBC count > 12K, PNA on imaging. Admission labs showed WBC count 13K, CRP < 0.3 -> 0.66, lactate 1.5, Covid/flu negative, UA benign, Bcx's NGTD. CTPE showed No PE, mild infiltrate and/or atelectasis in dependent portions posterior lung bases. SLP consulted and noted no aspiration.  Patient treated w/ IV flagyl and Azactam.  Patient switched to PO Flagyl today for 4 more doses, patient's daughter has deferred last doses of Azactam as she wants  "to get him home and settled in before it gets too late and wanted to get her dad settled in so she can focus on her  who is also requiring her care at this time.  Home health ordered, will ambulate to see if needs home oxygen upon discharge but has been on room air, f/u PCP 1 week.    #Electrolyte Abnormalities  Borderline Hypomagnesemia - Mg 1.8 on admission, Replaced per protocol  Acute vs Chronic Hyponatremia - Na 114 on admission, Holding home HCTZ and Bumex, Nephrology consulted and followed, suspect SIADH, s/p salt tabs, continue 2L fluid restriction, f/u Nephrology 2 weeks.    #HTN/HLD/Non-obstructive CAD and chronic LBBB  #Chronic HFpEF  #Hx AS s/p TAVR w/ recurrent mild-mod AS  #Mod MS  Labs showed trops 0.013, proBNP 413. EKG showed NSR, LBBB. Echo LVEF 61-65%, mild concentric LVH, mild LA volume increase, mild-mod AS valve area 1.2cm^2 w/ mean 6mmHg, mod MS w/ valve area 1.1cm^2 and mean gradient 4mmHg. CT showed mild cardiomegaly, prior CABG, prior TAVR.  Cards consulted and followed, have back on home ASA and statin, home coreg and ARB.  Home  Amlodipine, Hydralazine, Spironolactone and Bumex held at time of discharge, f/u PCP 1 week for CMP and BP check, need to resume these medications.   HH ordered to follow for cardiopulmonary assessments. Of note statin continued at lower than home dose due to elevated CK on admission and AST elevation, repeat hepatic function panel per PCP as outpatient.     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  MRI showed ventriculomegaly, no evidence of infarct.  Consulted TeleNeuro, rec'd referral to outpatient movement disorder specialist and will refer upon discharge.    #Irregular " Spiculated Posterior L Costophrenic Angle Mass  CT Chest showed small suspicious irregular mass posterior L costophrenic angle, measures 2.2cm, rec'd serial f/u 2-3m w/ Chest CT and if persists PET thereafter.  Pulm consulted and rec'd repeat CT chest 2-3 months outpatient, rec'd PET at that time. F/u pulm outpatient 1 month.     #CKDIII  Patient presented w/ Cr 1.59, b/l around 1.3-1.5, has remained w/in baseline. Trended Cr and UOP, avoid nephrotoxins, NSAIDs, dehydration and contrast as able. Cr today below baseline, f/u Nephrology 2 weeks.    #IDDM Type II, controlled, unknown complications  Hgb A1c = 5.9%. No home oral agents, while inpatient has not required long acting insulin and held at discharge, have sent home on SSI for now, f/u PCP for Glc check and need to resume long acting insulin.     #Hematuria - Resolved  Noted redish urine in stephen 1/4, worse 1/6, improved following d/c SQH and stephen removal.  Repeat UA per PCP.    #Hypothyroid  Continued home Levothyroxine    #BPH  Daughter would like to try flomax instead of his home med and have rx'd at discharge    #Chronic Pain  Reviewed MARCUS, held home gabapentin upon discharge, has not required inpatient    #Advanced Age/Age Related Debility  Supportive Care, have ordered home therapy at time of discharge via HH    #Obesity by BMI  BMI 34, complicates all aspects of care    VITAL SIGNS:  Temp:  [97.9 °F (36.6 °C)-98.9 °F (37.2 °C)] 98 °F (36.7 °C)  Heart Rate:  [64-82] 64  Resp:  [18-20] 18  BP: (129-188)/(53-86) 129/53  SpO2:  [94 %-99 %] 96 %  on  Flow (L/min):  [2] 2;   Device (Oxygen Therapy): room air    Body mass index is 31.81 kg/m².  Wt Readings from Last 3 Encounters:   01/08/22 89.4 kg (197 lb 1.6 oz)     PHYSICAL EXAM:  Constitutional:  Elderly, No acute distress.      HENT:  Head:  Normocephalic and atraumatic.  Mouth:  Moist mucous membranes.    Eyes:  Conjunctivae and EOM are normal. No scleral icterus.    Neck:  Neck supple.  No JVD  present.    Cardiovascular:  Normal rate, regular rhythm and normal heart sounds with no murmur.  Pulmonary/Chest:  No respiratory distress, no wheezes, on room air now  Abdominal:  Soft. No distension and no tenderness.   Musculoskeletal:  No tenderness, and no deformity.     Neurological:  Alert and oriented to person, place.  No gross focal deficits   Skin:  Skin is warm and dry. No rash noted. No pallor.   Peripheral vascular:  No clubbing, no cyanosis, no edema.    DISCHARGE DISPOSITION   Stable    DISCHARGE MEDICATIONS:     Your medication list      START taking these medications      Instructions Last Dose Given Next Dose Due   insulin aspart 100 UNIT/ML solution pen-injector sc pen  Commonly known as: novoLOG FLEXPEN  Notes to patient: Blood glucose 150-199 mg/dL - 2 units  Blood glucose 200-249 mg/dL - 3 units  Blood glucose 250-299 mg/dL - 4 units  Blood glucose 300-349 mg/dL - 5 units  Blood glucose 350-400 mg/dL - 6 units  Blood glucose greater than 400 mg/dL - 7 units and call provider      Inject 0-7 Units under the skin into the appropriate area as directed 3 (Three) Times a Day Before Meals.          CHANGE how you take these medications      Instructions Last Dose Given Next Dose Due   rosuvastatin 10 MG tablet  Commonly known as: CRESTOR  What changed:   · medication strength  · how much to take  · when to take this      Take 1 tablet by mouth Every Night.          CONTINUE taking these medications      Instructions Last Dose Given Next Dose Due   cetirizine 10 MG tablet  Commonly known as: zyrTEC      Take 10 mg by mouth Every Night.       aspirin  MG tablet      Take 325 mg by mouth Daily.       carvedilol 6.25 MG tablet  Commonly known as: COREG      Take 6.25 mg by mouth 2 (Two) Times a Day With Meals.       ferrous sulfate 325 (65 FE) MG tablet      Take 325 mg by mouth 2 (Two) Times a Day.       fluticasone 50 MCG/ACT nasal spray  Commonly known as: FLONASE      2 sprays into the  nostril(s) as directed by provider Daily As Needed for Rhinitis.       HYDROcodone-acetaminophen 5-325 MG per tablet  Commonly known as: NORCO      Take 1 tablet by mouth 2 (Two) Times a Day As Needed for Mild Pain .       levothyroxine 75 MCG tablet  Commonly known as: SYNTHROID, LEVOTHROID      Take 75 mcg by mouth Daily.       losartan 50 MG tablet  Commonly known as: COZAAR      Take 50 mg by mouth Every Night.       multivitamin tablet tablet      Take 1 tablet by mouth Daily.       polyethylene glycol 17 GM/SCOOP powder  Commonly known as: MIRALAX      Take 17 g by mouth Daily As Needed (Constipation).       PreserVision AREDS tablet tablet      Take 1 tablet by mouth 2 (Two) Times a Day.          STOP taking these medications    amLODIPine 10 MG tablet  Commonly known as: NORVASC        bumetanide 1 MG tablet  Commonly known as: BUMEX        colchicine 0.6 MG tablet        gabapentin 600 MG tablet  Commonly known as: NEURONTIN        hydrALAZINE 100 MG tablet  Commonly known as: APRESOLINE        hydroCHLOROthiazide 25 MG tablet  Commonly known as: HYDRODIURIL        insulin glargine 100 UNIT/ML injection  Commonly known as: LANTUS SEMGLEE        spironolactone 25 MG tablet  Commonly known as: ALDACTONE        terazosin 5 MG capsule  Commonly known as: HYTRIN              Where to Get Your Medications      These medications were sent to [facility]  [address] [place] [id number]    Hours: 8AM-6PM Mon-Fri Phone: [phone]   · insulin aspart 100 UNIT/ML solution pen-injector sc pen  · rosuvastatin 10 MG tablet           Diet Instructions     Diet: Soft Texture; Thin Liquids, No Restrictions; Ground      Discharge Diet: Soft Texture    Fluid Consistency: Thin Liquids, No Restrictions    Soft Options: Ground    Fluid Restriction per day: Other (See comments)    2000ml Fluid Restriction        Activity Instructions     Activity as Tolerated          Additional Instructions for the  Follow-ups that You Need to Schedule     Ambulatory Referral to Home Health   As directed      Face to Face Visit Date: 1/8/2022    Follow-up provider for Plan of Care?: I treated the patient in an acute care facility and will not continue treatment after discharge.    Follow-up provider: CARSON MATHEW [[id number]]    Reason/Clinical Findings: Debility    Describe mobility limitations that make leaving home difficult: Debility, PNA, RUDOLPH    Nursing/Therapeutic Services Requested: Skilled Nursing Physical Therapy    Skilled nursing orders: Cardiopulmonary assessments    PT orders: Strengthening Therapeutic exercise    Frequency: 1 Week 1         Discharge Follow-up with PCP   As directed       Currently Documented PCP:    Provider, No Known    PCP Phone Number:    [phone]     Follow Up Details: 1 week post hospital discharge, needs basic labs (CBC, BMP), need BP check, needs Glc check, needs outpatient PET ordered         Discharge Follow-up with Specified Provider: Nephrology 2 weeks   As directed      To: Nephrology 2 weeks         Discharge Follow-up with Specified Provider: [facility] Specialist, Next available   As directed      To: [facility] Movement Disorder Specialist, Next available         Discharge Follow-up with Specified Provider: Pulmonary, 1 month, patient's daughter to schedule, needs repeat CT Chest 2-3 months   As directed      To: Pulmonary, 1 month, needs repeat CT Chest 2-3 months            Follow-up Information     Maryann Armas MD Follow up.    Specialty: Nephrology  Why: pt  has  an  apt  with  dr cr  in  [place]  office  for  january 12  at  11 :45  Contact information:  [address] [place] [address] [phone]             Provider, No Known .    Why: 1 week post hospital discharge, needs basic labs (CBC, BMP), need BP check, needs Glc check, needs outpatient PET ordered  Contact information:  [facility] [address] [phone]                         TEST  RESULTS PENDING AT DISCHARGE  None    CODE STATUS  Code Status and Medical Interventions:   Ordered at: 01/02/22 2045     Code Status (Patient has no pulse and is not breathing):    CPR (Attempt to Resuscitate)     Medical Interventions (Patient has pulse or is breathing):    Full Support     Chago Alvarenga MD  [facility]  01/08/22  12:24 EST    Please note that this discharge summary required more than 30 minutes to complete.

## 2022-01-08 NOTE — PLAN OF CARE
Goal Outcome Evaluation:  Pt resting in bed. Pt daughter preferred awakenings minimized due to previous agitation. Pt otherwise has no complaints. Will continue to follow plan of care.

## 2022-01-08 NOTE — NURSING NOTE
Called Ayes[facility]USER [phone] to set-up home health. SHAYLA Merino took information.      Update: 1323  Pt being discharged home with A[facility]USER. Ambulatory pulse ox will need to be obtained prior to discharge. Made SHAYLA Burk aware discharge is complete.

## 2022-01-09 ENCOUNTER — READMISSION MANAGEMENT (OUTPATIENT)
Dept: CALL CENTER | Facility: HOSPITAL | Age: 87
End: 2022-01-09

## 2022-01-09 NOTE — OUTREACH NOTE
Prep Survey      Responses   [facility] discharged from? [facility]   Is LACE score < 7 ? No   Emergency Room discharge w/ pulse ox? No   Eligibility Readm Mgmt   Discharge diagnosis Hyponatremia   Does the patient have one of the following disease processes/diagnoses(primary or secondary)? Sepsis   Does the patient have Home health ordered? No   Is there a DME ordered? No   Comments regarding appointments Follow up with Maryann Armas MD   Medication alerts for this patient see AVS   Prep survey completed? Yes          Ching Friedman RN

## 2022-01-10 ENCOUNTER — TELEPHONE (OUTPATIENT)
Dept: TELEMETRY | Facility: HOSPITAL | Age: 87
End: 2022-01-10

## 2022-01-12 ENCOUNTER — READMISSION MANAGEMENT (OUTPATIENT)
Dept: CALL CENTER | Facility: HOSPITAL | Age: 87
End: 2022-01-12

## 2022-01-12 NOTE — OUTREACH NOTE
Sepsis Week 1 Survey      Responses   [facility] patient discharged from? [facility]   Does the patient have one of the following disease processes/diagnoses(primary or secondary)? Sepsis   Week 1 attempt successful? No   Unsuccessful attempts Attempt 1          Dian Pratt RN

## 2022-01-17 ENCOUNTER — READMISSION MANAGEMENT (OUTPATIENT)
Dept: CALL CENTER | Facility: HOSPITAL | Age: 87
End: 2022-01-17

## 2022-01-17 NOTE — OUTREACH NOTE
Sepsis Week 1 Survey      Responses   [facility] [place] patient discharged from? [facility]   Does the patient have one of the following disease processes/diagnoses(primary or secondary)? Sepsis   Week 1 attempt successful? Yes   Call start time 1419   Call end time 1424   General alerts for this patient Daughter is a nurse   Discharge diagnosis Hyponatremia   Person spoke with today (if not patient) and relationship Naima-daughter   Meds reviewed with patient/caregiver? Yes   Is the patient having any side effects they believe may be caused by any medication additions or changes? No   Does the patient have all medications related to this admission filled (includes all antibiotics, inhalers, nebulizers,steroids,etc.) Yes   Is the patient taking all medications as directed (includes completed medication regime)? Yes   Comments regarding appointments Toribio Fuchs on 1/12/22   Does the patient have a primary care provider?  Yes   Has the patient kept scheduled appointments due by today? Yes   Has home health visited the patient within 72 hours of discharge? N/A   Psychosocial issues? No   Did the patient receive a copy of their discharge instructions? Yes   Nursing interventions Reviewed instructions with patient   What is the patient's perception of their health status since discharge? Improving   Nursing interventions Nurse provided patient education   Is the patient/caregiver able to teach back Sepsis? S - Shivering,fever or very cold,  E - Extreme pain or generalized discomfort (worst ever,especially abdomen),  S - Sleepy, difficult to arouse,confused,  S - Short of breath   Nursing interventions Nurse provided patient education   Is patient/caregiver able to teach back steps to recovery at home? Set small, achievable goals for return to baseline health,  Rest and regain strength,  Eat a balanced diet   Is the patient/caregiver able to teach back signs and symptoms of worsening condition: Fever,  Rapid heart rate (>90),   Shortness of breath/rapid respiratory rate,  Altered mental status(confusion/coma)   If the patient is a current smoker, are they able to teach back resources for cessation? Not a smoker   Is the patient/caregiver able to teach back the hierarchy of who to call/visit for symptoms/problems? PCP, Specialist, Home health nurse, Urgent Care, ED, 911 Yes   Week 1 call completed? Yes          CYNTHIA BRICE RN

## 2022-01-19 ENCOUNTER — LAB REQUISITION (OUTPATIENT)
Dept: LAB | Facility: HOSPITAL | Age: 87
End: 2022-01-19

## 2022-01-19 DIAGNOSIS — E87.1 HYPO-OSMOLALITY AND HYPONATREMIA: ICD-10-CM

## 2022-01-19 LAB
ALBUMIN SERPL-MCNC: 4.21 G/DL (ref 3.5–5.2)
ALBUMIN/GLOB SERPL: 1.6 G/DL
ALP SERPL-CCNC: 87 U/L (ref 39–117)
ALT SERPL W P-5'-P-CCNC: 28 U/L (ref 1–41)
ANION GAP SERPL CALCULATED.3IONS-SCNC: 13 MMOL/L (ref 5–15)
AST SERPL-CCNC: 40 U/L (ref 1–40)
BASOPHILS # BLD AUTO: 0.07 10*3/MM3 (ref 0–0.2)
BASOPHILS NFR BLD AUTO: 1 % (ref 0–1.5)
BILIRUB SERPL-MCNC: 0.4 MG/DL (ref 0–1.2)
BUN SERPL-MCNC: 21 MG/DL (ref 8–23)
BUN/CREAT SERPL: 12.5 (ref 7–25)
CALCIUM SPEC-SCNC: 10 MG/DL (ref 8.6–10.5)
CHLORIDE SERPL-SCNC: 103 MMOL/L (ref 98–107)
CO2 SERPL-SCNC: 22 MMOL/L (ref 22–29)
CREAT SERPL-MCNC: 1.68 MG/DL (ref 0.76–1.27)
DEPRECATED RDW RBC AUTO: 58.8 FL (ref 37–54)
EOSINOPHIL # BLD AUTO: 0.22 10*3/MM3 (ref 0–0.4)
EOSINOPHIL NFR BLD AUTO: 3.1 % (ref 0.3–6.2)
ERYTHROCYTE [DISTWIDTH] IN BLOOD BY AUTOMATED COUNT: 15.9 % (ref 12.3–15.4)
FERRITIN SERPL-MCNC: 354.3 NG/ML (ref 30–400)
GFR SERPL CREATININE-BSD FRML MDRD: 39 ML/MIN/1.73
GLOBULIN UR ELPH-MCNC: 2.7 GM/DL
GLUCOSE SERPL-MCNC: 55 MG/DL (ref 65–99)
HCT VFR BLD AUTO: 36.2 % (ref 37.5–51)
HGB BLD-MCNC: 11.4 G/DL (ref 13–17.7)
IMM GRANULOCYTES # BLD AUTO: 0.02 10*3/MM3 (ref 0–0.05)
IMM GRANULOCYTES NFR BLD AUTO: 0.3 % (ref 0–0.5)
IRON 24H UR-MRATE: 87 MCG/DL (ref 59–158)
IRON SATN MFR SERPL: 28 % (ref 20–50)
LYMPHOCYTES # BLD AUTO: 1.38 10*3/MM3 (ref 0.7–3.1)
LYMPHOCYTES NFR BLD AUTO: 19.3 % (ref 19.6–45.3)
MCH RBC QN AUTO: 32.1 PG (ref 26.6–33)
MCHC RBC AUTO-ENTMCNC: 31.5 G/DL (ref 31.5–35.7)
MCV RBC AUTO: 102 FL (ref 79–97)
MONOCYTES # BLD AUTO: 0.78 10*3/MM3 (ref 0.1–0.9)
MONOCYTES NFR BLD AUTO: 10.9 % (ref 5–12)
NEUTROPHILS NFR BLD AUTO: 4.69 10*3/MM3 (ref 1.7–7)
NEUTROPHILS NFR BLD AUTO: 65.4 % (ref 42.7–76)
NRBC BLD AUTO-RTO: 0 /100 WBC (ref 0–0.2)
PLATELET # BLD AUTO: 304 10*3/MM3 (ref 140–450)
PMV BLD AUTO: 10.8 FL (ref 6–12)
POTASSIUM SERPL-SCNC: 4.8 MMOL/L (ref 3.5–5.2)
PROT SERPL-MCNC: 6.9 G/DL (ref 6–8.5)
RBC # BLD AUTO: 3.55 10*6/MM3 (ref 4.14–5.8)
SODIUM SERPL-SCNC: 138 MMOL/L (ref 136–145)
TIBC SERPL-MCNC: 316 MCG/DL (ref 298–536)
TRANSFERRIN SERPL-MCNC: 212 MG/DL (ref 200–360)
WBC NRBC COR # BLD: 7.16 10*3/MM3 (ref 3.4–10.8)

## 2022-01-19 PROCEDURE — 80053 COMPREHEN METABOLIC PANEL: CPT | Performed by: INTERNAL MEDICINE

## 2022-01-19 PROCEDURE — 84466 ASSAY OF TRANSFERRIN: CPT | Performed by: INTERNAL MEDICINE

## 2022-01-19 PROCEDURE — 82043 UR ALBUMIN QUANTITATIVE: CPT | Performed by: INTERNAL MEDICINE

## 2022-01-19 PROCEDURE — 85025 COMPLETE CBC W/AUTO DIFF WBC: CPT | Performed by: INTERNAL MEDICINE

## 2022-01-19 PROCEDURE — 82570 ASSAY OF URINE CREATININE: CPT | Performed by: INTERNAL MEDICINE

## 2022-01-19 PROCEDURE — 82728 ASSAY OF FERRITIN: CPT | Performed by: INTERNAL MEDICINE

## 2022-01-19 PROCEDURE — 83540 ASSAY OF IRON: CPT | Performed by: INTERNAL MEDICINE

## 2022-01-20 LAB
ALBUMIN UR-MCNC: 74 MG/DL
CREAT UR-MCNC: 82 MG/DL
MICROALBUMIN/CREAT UR: 902.4 MG/G

## 2022-01-25 ENCOUNTER — READMISSION MANAGEMENT (OUTPATIENT)
Dept: CALL CENTER | Facility: HOSPITAL | Age: 87
End: 2022-01-25

## 2022-01-25 NOTE — OUTREACH NOTE
"Sepsis Week 2 Survey      Responses   [facility] patient discharged from? [facility]   Does the patient have one of the following disease processes/diagnoses(primary or secondary)? Sepsis   Week 2 attempt successful? Yes   Call start time 1006   Call end time 1009   General alerts for this patient Daughter is a nurse   Discharge diagnosis Hyponatremia   Person spoke with today (if not patient) and relationship Naima-daughter   Meds reviewed with patient/caregiver? Yes   Is the patient having any side effects they believe may be caused by any medication additions or changes? Yes   Side effects comments  Flomax was causing him to see things and medication was changed back to terazosin with no further issues.   Does the patient have all medications related to this admission filled (includes all antibiotics, inhalers, nebulizers,steroids,etc.) Yes   Is the patient taking all medications as directed (includes completed medication regime)? Yes   Does the patient have a primary care provider?  Yes   Has the patient kept scheduled appointments due by today? Yes   Has home health visited the patient within 72 hours of discharge? Yes   Psychosocial issues? No   What is the patient's perception of their health status since discharge? Improving   If the patient is a current smoker, are they able to teach back resources for cessation? Not a smoker   Is the patient/caregiver able to teach back the hierarchy of who to call/visit for symptoms/problems? PCP, Specialist, Home health nurse, Urgent Care, ED, 911 Yes   Additional teach back comments States \"he is doing better than he was\".  Blood sugars are good.  Home health is still coming.   Week 2 call completed? Yes   Revoked No further contact(revokes)-requires comment   Is the patient interested in additional calls from an ambulatory ?  NOTE:  applies to high risk patients requiring additional follow-up. No   Graduated/Revoked comments Denies questions or needs at " this time          Carolyn Courtney LPN

## 2022-02-14 ENCOUNTER — LAB REQUISITION (OUTPATIENT)
Dept: LAB | Facility: HOSPITAL | Age: 87
End: 2022-02-14

## 2022-02-14 DIAGNOSIS — E83.10 DISORDER OF IRON METABOLISM, UNSPECIFIED: ICD-10-CM

## 2022-02-14 DIAGNOSIS — I25.10 ATHEROSCLEROTIC HEART DISEASE OF NATIVE CORONARY ARTERY WITHOUT ANGINA PECTORIS: ICD-10-CM

## 2022-02-14 DIAGNOSIS — R94.6 ABNORMAL RESULTS OF THYROID FUNCTION STUDIES: ICD-10-CM

## 2022-02-14 DIAGNOSIS — E83.39 OTHER DISORDERS OF PHOSPHORUS METABOLISM: ICD-10-CM

## 2022-02-14 DIAGNOSIS — E11.22 TYPE 2 DIABETES MELLITUS WITH DIABETIC CHRONIC KIDNEY DISEASE: ICD-10-CM

## 2022-02-14 DIAGNOSIS — R68.89 OTHER GENERAL SYMPTOMS AND SIGNS: ICD-10-CM

## 2022-02-14 LAB
ANION GAP SERPL CALCULATED.3IONS-SCNC: 13.3 MMOL/L (ref 5–15)
BASOPHILS # BLD AUTO: 0.04 10*3/MM3 (ref 0–0.2)
BASOPHILS NFR BLD AUTO: 0.3 % (ref 0–1.5)
BUN SERPL-MCNC: 26 MG/DL (ref 8–23)
BUN/CREAT SERPL: 13.9 (ref 7–25)
CALCIUM SPEC-SCNC: 10.7 MG/DL (ref 8.6–10.5)
CHLORIDE SERPL-SCNC: 99 MMOL/L (ref 98–107)
CO2 SERPL-SCNC: 21.7 MMOL/L (ref 22–29)
CREAT SERPL-MCNC: 1.87 MG/DL (ref 0.76–1.27)
DEPRECATED RDW RBC AUTO: 54.4 FL (ref 37–54)
EOSINOPHIL # BLD AUTO: 0.15 10*3/MM3 (ref 0–0.4)
EOSINOPHIL NFR BLD AUTO: 1.3 % (ref 0.3–6.2)
ERYTHROCYTE [DISTWIDTH] IN BLOOD BY AUTOMATED COUNT: 14.6 % (ref 12.3–15.4)
FERRITIN SERPL-MCNC: 442.1 NG/ML (ref 30–400)
GFR SERPL CREATININE-BSD FRML MDRD: 34 ML/MIN/1.73
GLUCOSE SERPL-MCNC: 119 MG/DL (ref 65–99)
HBA1C MFR BLD: 6.1 % (ref 4.8–5.6)
HCT VFR BLD AUTO: 47.2 % (ref 37.5–51)
HGB BLD-MCNC: 15.2 G/DL (ref 13–17.7)
IMM GRANULOCYTES # BLD AUTO: 0.04 10*3/MM3 (ref 0–0.05)
IMM GRANULOCYTES NFR BLD AUTO: 0.3 % (ref 0–0.5)
IRON 24H UR-MRATE: 24 MCG/DL (ref 59–158)
IRON SATN MFR SERPL: 7 % (ref 20–50)
LYMPHOCYTES # BLD AUTO: 1.65 10*3/MM3 (ref 0.7–3.1)
LYMPHOCYTES NFR BLD AUTO: 14.2 % (ref 19.6–45.3)
MCH RBC QN AUTO: 32.3 PG (ref 26.6–33)
MCHC RBC AUTO-ENTMCNC: 32.2 G/DL (ref 31.5–35.7)
MCV RBC AUTO: 100.4 FL (ref 79–97)
MONOCYTES # BLD AUTO: 0.79 10*3/MM3 (ref 0.1–0.9)
MONOCYTES NFR BLD AUTO: 6.8 % (ref 5–12)
NEUTROPHILS NFR BLD AUTO: 77.1 % (ref 42.7–76)
NEUTROPHILS NFR BLD AUTO: 8.95 10*3/MM3 (ref 1.7–7)
NRBC BLD AUTO-RTO: 0 /100 WBC (ref 0–0.2)
PHOSPHATE SERPL-MCNC: 3.8 MG/DL (ref 2.5–4.5)
PLATELET # BLD AUTO: 187 10*3/MM3 (ref 140–450)
PMV BLD AUTO: 11.3 FL (ref 6–12)
POTASSIUM SERPL-SCNC: 5.9 MMOL/L (ref 3.5–5.2)
RBC # BLD AUTO: 4.7 10*6/MM3 (ref 4.14–5.8)
SODIUM SERPL-SCNC: 134 MMOL/L (ref 136–145)
TIBC SERPL-MCNC: 338 MCG/DL (ref 298–536)
TRANSFERRIN SERPL-MCNC: 227 MG/DL (ref 200–360)
TSH SERPL DL<=0.05 MIU/L-ACNC: 2.74 UIU/ML (ref 0.27–4.2)
WBC NRBC COR # BLD: 11.62 10*3/MM3 (ref 3.4–10.8)

## 2022-02-14 PROCEDURE — 82728 ASSAY OF FERRITIN: CPT | Performed by: INTERNAL MEDICINE

## 2022-02-14 PROCEDURE — 84466 ASSAY OF TRANSFERRIN: CPT | Performed by: INTERNAL MEDICINE

## 2022-02-14 PROCEDURE — 83540 ASSAY OF IRON: CPT | Performed by: INTERNAL MEDICINE

## 2022-02-14 PROCEDURE — 84100 ASSAY OF PHOSPHORUS: CPT | Performed by: INTERNAL MEDICINE

## 2022-02-14 PROCEDURE — 80048 BASIC METABOLIC PNL TOTAL CA: CPT | Performed by: INTERNAL MEDICINE

## 2022-02-14 PROCEDURE — 84443 ASSAY THYROID STIM HORMONE: CPT | Performed by: INTERNAL MEDICINE

## 2022-02-14 PROCEDURE — 83036 HEMOGLOBIN GLYCOSYLATED A1C: CPT | Performed by: INTERNAL MEDICINE

## 2022-02-14 PROCEDURE — 85025 COMPLETE CBC W/AUTO DIFF WBC: CPT | Performed by: INTERNAL MEDICINE

## 2022-02-23 ENCOUNTER — LAB (OUTPATIENT)
Dept: LAB | Facility: HOSPITAL | Age: 87
End: 2022-02-23

## 2022-02-23 ENCOUNTER — TRANSCRIBE ORDERS (OUTPATIENT)
Dept: ADMINISTRATIVE | Facility: HOSPITAL | Age: 87
End: 2022-02-23

## 2022-02-23 DIAGNOSIS — E83.52 HYPERCALCEMIA: ICD-10-CM

## 2022-02-23 DIAGNOSIS — E87.5 HYPERKALEMIA: Primary | ICD-10-CM

## 2022-02-23 DIAGNOSIS — E87.5 HYPERKALEMIA: ICD-10-CM

## 2022-02-23 LAB
ANION GAP SERPL CALCULATED.3IONS-SCNC: 11.8 MMOL/L (ref 5–15)
BUN SERPL-MCNC: 22 MG/DL (ref 8–23)
BUN/CREAT SERPL: 14.7 (ref 7–25)
CA-I SERPL ISE-MCNC: 1.4 MMOL/L (ref 1.12–1.32)
CALCIUM SPEC-SCNC: 10.3 MG/DL (ref 8.6–10.5)
CHLORIDE SERPL-SCNC: 98 MMOL/L (ref 98–107)
CO2 SERPL-SCNC: 23.2 MMOL/L (ref 22–29)
CREAT SERPL-MCNC: 1.5 MG/DL (ref 0.76–1.27)
GFR SERPL CREATININE-BSD FRML MDRD: 44 ML/MIN/1.73
GLUCOSE SERPL-MCNC: 125 MG/DL (ref 65–99)
POTASSIUM SERPL-SCNC: 5 MMOL/L (ref 3.5–5.2)
SODIUM SERPL-SCNC: 133 MMOL/L (ref 136–145)

## 2022-02-23 PROCEDURE — 36415 COLL VENOUS BLD VENIPUNCTURE: CPT

## 2022-02-23 PROCEDURE — 80048 BASIC METABOLIC PNL TOTAL CA: CPT

## 2022-02-23 PROCEDURE — 82330 ASSAY OF CALCIUM: CPT

## 2022-03-02 ENCOUNTER — LAB (OUTPATIENT)
Dept: LAB | Facility: HOSPITAL | Age: 87
End: 2022-03-02

## 2022-03-02 ENCOUNTER — TRANSCRIBE ORDERS (OUTPATIENT)
Dept: ADMINISTRATIVE | Facility: HOSPITAL | Age: 87
End: 2022-03-02

## 2022-03-02 DIAGNOSIS — Z12.5 SPECIAL SCREENING FOR MALIGNANT NEOPLASM OF PROSTATE: ICD-10-CM

## 2022-03-02 DIAGNOSIS — N39.41 URGE INCONTINENCE: ICD-10-CM

## 2022-03-02 DIAGNOSIS — E83.52 HYPERCALCEMIA: Primary | ICD-10-CM

## 2022-03-02 DIAGNOSIS — E83.52 HYPERCALCEMIA: ICD-10-CM

## 2022-03-02 PROCEDURE — 84155 ASSAY OF PROTEIN SERUM: CPT

## 2022-03-02 PROCEDURE — 84165 PROTEIN E-PHORESIS SERUM: CPT

## 2022-03-02 PROCEDURE — 83970 ASSAY OF PARATHORMONE: CPT

## 2022-03-02 PROCEDURE — 86334 IMMUNOFIX E-PHORESIS SERUM: CPT

## 2022-03-02 PROCEDURE — 82784 ASSAY IGA/IGD/IGG/IGM EACH: CPT

## 2022-03-02 PROCEDURE — G0103 PSA SCREENING: HCPCS

## 2022-03-02 PROCEDURE — 36415 COLL VENOUS BLD VENIPUNCTURE: CPT

## 2022-03-03 LAB
ALBUMIN SERPL ELPH-MCNC: 3.2 G/DL (ref 2.9–4.4)
ALBUMIN/GLOB SERPL: 1.1 {RATIO} (ref 0.7–1.7)
ALPHA1 GLOB SERPL ELPH-MCNC: 0.3 G/DL (ref 0–0.4)
ALPHA2 GLOB SERPL ELPH-MCNC: 1.1 G/DL (ref 0.4–1)
B-GLOBULIN SERPL ELPH-MCNC: 1 G/DL (ref 0.7–1.3)
GAMMA GLOB SERPL ELPH-MCNC: 0.7 G/DL (ref 0.4–1.8)
GLOBULIN SER-MCNC: 3 G/DL (ref 2.2–3.9)
IGA SERPL-MCNC: 253 MG/DL (ref 61–437)
IGG SERPL-MCNC: 595 MG/DL (ref 603–1613)
IGM SERPL-MCNC: 168 MG/DL (ref 15–143)
INTERPRETATION SERPL IEP-IMP: ABNORMAL
LABORATORY COMMENT REPORT: ABNORMAL
M PROTEIN SERPL ELPH-MCNC: ABNORMAL G/DL
PROT SERPL-MCNC: 6.2 G/DL (ref 6–8.5)
PSA SERPL-MCNC: 13.7 NG/ML (ref 0–4)
PTH-INTACT SERPL-MCNC: 54.5 PG/ML (ref 15–65)

## 2022-05-04 ENCOUNTER — TRANSCRIBE ORDERS (OUTPATIENT)
Dept: ADMINISTRATIVE | Facility: HOSPITAL | Age: 87
End: 2022-05-04

## 2022-05-04 ENCOUNTER — LAB (OUTPATIENT)
Dept: LAB | Facility: HOSPITAL | Age: 87
End: 2022-05-04

## 2022-05-04 DIAGNOSIS — N18.9 CHRONIC KIDNEY DISEASE, UNSPECIFIED CKD STAGE: ICD-10-CM

## 2022-05-04 DIAGNOSIS — E11.9 DIABETES MELLITUS WITHOUT COMPLICATION: ICD-10-CM

## 2022-05-04 DIAGNOSIS — E87.5 HYPERKALEMIA: ICD-10-CM

## 2022-05-04 DIAGNOSIS — E83.52 HYPERCALCEMIA: ICD-10-CM

## 2022-05-04 DIAGNOSIS — N18.9 CHRONIC KIDNEY DISEASE, UNSPECIFIED CKD STAGE: Primary | ICD-10-CM

## 2022-05-04 LAB
CA-I BLD-MCNC: 5.8 MG/DL (ref 4.6–5.4)
CA-I SERPL ISE-MCNC: 1.45 MMOL/L (ref 1.15–1.35)
CREAT UR-MCNC: 56.7 MG/DL
FERRITIN SERPL-MCNC: 393 NG/ML (ref 30–400)
HBA1C MFR BLD: 7 % (ref 4.8–5.6)
IRON 24H UR-MRATE: 55 MCG/DL (ref 59–158)
IRON SATN MFR SERPL: 19 % (ref 20–50)
PROT ?TM UR-MCNC: 84.3 MG/DL
PROT/CREAT UR: 1486.8 MG/G CREA (ref 0–200)
PTH-INTACT SERPL-MCNC: 48.3 PG/ML (ref 15–65)
TIBC SERPL-MCNC: 288 MCG/DL (ref 298–536)
TRANSFERRIN SERPL-MCNC: 193 MG/DL (ref 200–360)

## 2022-05-04 PROCEDURE — 83540 ASSAY OF IRON: CPT

## 2022-05-04 PROCEDURE — 84156 ASSAY OF PROTEIN URINE: CPT

## 2022-05-04 PROCEDURE — 82330 ASSAY OF CALCIUM: CPT

## 2022-05-04 PROCEDURE — 82570 ASSAY OF URINE CREATININE: CPT

## 2022-05-04 PROCEDURE — 82728 ASSAY OF FERRITIN: CPT

## 2022-05-04 PROCEDURE — 36415 COLL VENOUS BLD VENIPUNCTURE: CPT

## 2022-05-04 PROCEDURE — 83036 HEMOGLOBIN GLYCOSYLATED A1C: CPT

## 2022-05-04 PROCEDURE — 84466 ASSAY OF TRANSFERRIN: CPT

## 2022-05-04 PROCEDURE — 83970 ASSAY OF PARATHORMONE: CPT

## 2022-10-03 ENCOUNTER — TRANSCRIBE ORDERS (OUTPATIENT)
Dept: ADMINISTRATIVE | Facility: HOSPITAL | Age: 87
End: 2022-10-03

## 2022-10-03 ENCOUNTER — LAB (OUTPATIENT)
Dept: LAB | Facility: HOSPITAL | Age: 87
End: 2022-10-03

## 2022-10-03 DIAGNOSIS — I51.9 MYXEDEMA HEART DISEASE: Primary | ICD-10-CM

## 2022-10-03 DIAGNOSIS — E03.9 MYXEDEMA HEART DISEASE: ICD-10-CM

## 2022-10-03 DIAGNOSIS — E11.21 TYPE 2 DIABETES MELLITUS WITH NEPHROPATHY: ICD-10-CM

## 2022-10-03 DIAGNOSIS — N18.9 CHRONIC KIDNEY DISEASE, UNSPECIFIED CKD STAGE: ICD-10-CM

## 2022-10-03 DIAGNOSIS — I51.9 MYXEDEMA HEART DISEASE: ICD-10-CM

## 2022-10-03 DIAGNOSIS — E03.9 HYPOTHYROIDISM, UNSPECIFIED TYPE: ICD-10-CM

## 2022-10-03 DIAGNOSIS — E03.9 MYXEDEMA HEART DISEASE: Primary | ICD-10-CM

## 2022-10-03 LAB
ALBUMIN SERPL-MCNC: 4 G/DL (ref 3.5–5.2)
ALBUMIN/GLOB SERPL: 1.3 G/DL
ALP SERPL-CCNC: 116 U/L (ref 39–117)
ALT SERPL W P-5'-P-CCNC: 47 U/L (ref 1–41)
ANION GAP SERPL CALCULATED.3IONS-SCNC: 13.4 MMOL/L (ref 5–15)
AST SERPL-CCNC: 52 U/L (ref 1–40)
BASOPHILS # BLD AUTO: 0.06 10*3/MM3 (ref 0–0.2)
BASOPHILS NFR BLD AUTO: 0.6 % (ref 0–1.5)
BILIRUB SERPL-MCNC: 0.3 MG/DL (ref 0–1.2)
BUN SERPL-MCNC: 20 MG/DL (ref 8–23)
BUN/CREAT SERPL: 12.5 (ref 7–25)
CALCIUM SPEC-SCNC: 10.2 MG/DL (ref 8.6–10.5)
CHLORIDE SERPL-SCNC: 100 MMOL/L (ref 98–107)
CO2 SERPL-SCNC: 23.6 MMOL/L (ref 22–29)
CREAT SERPL-MCNC: 1.6 MG/DL (ref 0.76–1.27)
DEPRECATED RDW RBC AUTO: 53.2 FL (ref 37–54)
EGFRCR SERPLBLD CKD-EPI 2021: 41.4 ML/MIN/1.73
EOSINOPHIL # BLD AUTO: 0.44 10*3/MM3 (ref 0–0.4)
EOSINOPHIL NFR BLD AUTO: 4.6 % (ref 0.3–6.2)
ERYTHROCYTE [DISTWIDTH] IN BLOOD BY AUTOMATED COUNT: 14.5 % (ref 12.3–15.4)
FERRITIN SERPL-MCNC: 570 NG/ML (ref 30–400)
GLOBULIN UR ELPH-MCNC: 3 GM/DL
GLUCOSE SERPL-MCNC: 78 MG/DL (ref 65–99)
HBA1C MFR BLD: 6.7 % (ref 4.8–5.6)
HCT VFR BLD AUTO: 36.4 % (ref 37.5–51)
HGB BLD-MCNC: 11.4 G/DL (ref 13–17.7)
IMM GRANULOCYTES # BLD AUTO: 0.11 10*3/MM3 (ref 0–0.05)
IMM GRANULOCYTES NFR BLD AUTO: 1.2 % (ref 0–0.5)
IRON 24H UR-MRATE: 60 MCG/DL (ref 59–158)
IRON SATN MFR SERPL: 20 % (ref 20–50)
LYMPHOCYTES # BLD AUTO: 2.48 10*3/MM3 (ref 0.7–3.1)
LYMPHOCYTES NFR BLD AUTO: 26 % (ref 19.6–45.3)
MCH RBC QN AUTO: 31.3 PG (ref 26.6–33)
MCHC RBC AUTO-ENTMCNC: 31.3 G/DL (ref 31.5–35.7)
MCV RBC AUTO: 100 FL (ref 79–97)
MONOCYTES # BLD AUTO: 0.8 10*3/MM3 (ref 0.1–0.9)
MONOCYTES NFR BLD AUTO: 8.4 % (ref 5–12)
NEUTROPHILS NFR BLD AUTO: 5.65 10*3/MM3 (ref 1.7–7)
NEUTROPHILS NFR BLD AUTO: 59.2 % (ref 42.7–76)
NRBC BLD AUTO-RTO: 0 /100 WBC (ref 0–0.2)
PLATELET # BLD AUTO: 316 10*3/MM3 (ref 140–450)
PMV BLD AUTO: 9.2 FL (ref 6–12)
POTASSIUM SERPL-SCNC: 4.5 MMOL/L (ref 3.5–5.2)
PROT SERPL-MCNC: 7 G/DL (ref 6–8.5)
RBC # BLD AUTO: 3.64 10*6/MM3 (ref 4.14–5.8)
SODIUM SERPL-SCNC: 137 MMOL/L (ref 136–145)
TIBC SERPL-MCNC: 301 MCG/DL (ref 298–536)
TRANSFERRIN SERPL-MCNC: 202 MG/DL (ref 200–360)
WBC NRBC COR # BLD: 9.54 10*3/MM3 (ref 3.4–10.8)

## 2022-10-03 PROCEDURE — 85025 COMPLETE CBC W/AUTO DIFF WBC: CPT

## 2022-10-03 PROCEDURE — 36415 COLL VENOUS BLD VENIPUNCTURE: CPT

## 2022-10-03 PROCEDURE — 84479 ASSAY OF THYROID (T3 OR T4): CPT

## 2022-10-03 PROCEDURE — 82728 ASSAY OF FERRITIN: CPT

## 2022-10-03 PROCEDURE — 84466 ASSAY OF TRANSFERRIN: CPT

## 2022-10-03 PROCEDURE — 84480 ASSAY TRIIODOTHYRONINE (T3): CPT

## 2022-10-03 PROCEDURE — 84443 ASSAY THYROID STIM HORMONE: CPT

## 2022-10-03 PROCEDURE — 83036 HEMOGLOBIN GLYCOSYLATED A1C: CPT

## 2022-10-03 PROCEDURE — 83540 ASSAY OF IRON: CPT

## 2022-10-03 PROCEDURE — 80053 COMPREHEN METABOLIC PANEL: CPT

## 2022-10-03 PROCEDURE — 84436 ASSAY OF TOTAL THYROXINE: CPT

## 2022-10-04 LAB
FT4I SERPL CALC-MCNC: 2.4 (ref 1.2–4.9)
T3 SERPL-MCNC: 72 NG/DL (ref 71–180)
T3RU NFR SERPL: 31 % (ref 24–39)
T4 SERPL-MCNC: 7.9 UG/DL (ref 4.5–12)
TSH SERPL DL<=0.005 MIU/L-ACNC: 1.72 UIU/ML (ref 0.45–4.5)

## 2022-11-30 ENCOUNTER — LAB (OUTPATIENT)
Dept: LAB | Facility: HOSPITAL | Age: 87
End: 2022-11-30

## 2022-11-30 ENCOUNTER — TRANSCRIBE ORDERS (OUTPATIENT)
Dept: ADMINISTRATIVE | Facility: HOSPITAL | Age: 87
End: 2022-11-30

## 2022-11-30 DIAGNOSIS — E87.5 HYPERKALEMIA: ICD-10-CM

## 2022-11-30 DIAGNOSIS — E11.21 TYPE II DIABETES MELLITUS WITH NEPHROPATHY: Primary | ICD-10-CM

## 2022-11-30 DIAGNOSIS — E11.21 TYPE II DIABETES MELLITUS WITH NEPHROPATHY: ICD-10-CM

## 2022-11-30 DIAGNOSIS — N39.0 COMPLICATED UTI (URINARY TRACT INFECTION): ICD-10-CM

## 2022-11-30 LAB
ALBUMIN SERPL-MCNC: 4.1 G/DL (ref 3.5–5.2)
ALBUMIN UR-MCNC: 52.7 MG/DL
ALBUMIN/GLOB SERPL: 1.6 G/DL
ALP SERPL-CCNC: 112 U/L (ref 39–117)
ALT SERPL W P-5'-P-CCNC: 16 U/L (ref 1–41)
ANION GAP SERPL CALCULATED.3IONS-SCNC: 10 MMOL/L (ref 5–15)
AST SERPL-CCNC: 26 U/L (ref 1–40)
BACTERIA UR QL AUTO: NORMAL /HPF
BASOPHILS # BLD AUTO: 0.06 10*3/MM3 (ref 0–0.2)
BASOPHILS NFR BLD AUTO: 0.7 % (ref 0–1.5)
BILIRUB SERPL-MCNC: 0.2 MG/DL (ref 0–1.2)
BILIRUB UR QL STRIP: NEGATIVE
BUN SERPL-MCNC: 15 MG/DL (ref 8–23)
BUN/CREAT SERPL: 10.8 (ref 7–25)
CALCIUM SPEC-SCNC: 10 MG/DL (ref 8.6–10.5)
CHLORIDE SERPL-SCNC: 101 MMOL/L (ref 98–107)
CLARITY UR: CLEAR
CO2 SERPL-SCNC: 25 MMOL/L (ref 22–29)
COLOR UR: YELLOW
CREAT SERPL-MCNC: 1.39 MG/DL (ref 0.76–1.27)
CREAT UR-MCNC: 40.6 MG/DL
CREAT UR-MCNC: 40.6 MG/DL
DEPRECATED RDW RBC AUTO: 51.4 FL (ref 37–54)
EGFRCR SERPLBLD CKD-EPI 2021: 49.1 ML/MIN/1.73
EOSINOPHIL # BLD AUTO: 0.21 10*3/MM3 (ref 0–0.4)
EOSINOPHIL NFR BLD AUTO: 2.3 % (ref 0.3–6.2)
ERYTHROCYTE [DISTWIDTH] IN BLOOD BY AUTOMATED COUNT: 14.1 % (ref 12.3–15.4)
FERRITIN SERPL-MCNC: 390 NG/ML (ref 30–400)
GLOBULIN UR ELPH-MCNC: 2.5 GM/DL
GLUCOSE SERPL-MCNC: 74 MG/DL (ref 65–99)
GLUCOSE UR STRIP-MCNC: NEGATIVE MG/DL
HBA1C MFR BLD: 5.7 % (ref 4.8–5.6)
HCT VFR BLD AUTO: 39.1 % (ref 37.5–51)
HGB BLD-MCNC: 12.2 G/DL (ref 13–17.7)
HGB UR QL STRIP.AUTO: NEGATIVE
HYALINE CASTS UR QL AUTO: NORMAL /LPF
IMM GRANULOCYTES # BLD AUTO: 0.03 10*3/MM3 (ref 0–0.05)
IMM GRANULOCYTES NFR BLD AUTO: 0.3 % (ref 0–0.5)
IRON 24H UR-MRATE: 46 MCG/DL (ref 59–158)
IRON SATN MFR SERPL: 16 % (ref 20–50)
KETONES UR QL STRIP: NEGATIVE
LEUKOCYTE ESTERASE UR QL STRIP.AUTO: NEGATIVE
LYMPHOCYTES # BLD AUTO: 1.42 10*3/MM3 (ref 0.7–3.1)
LYMPHOCYTES NFR BLD AUTO: 15.7 % (ref 19.6–45.3)
MCH RBC QN AUTO: 30.8 PG (ref 26.6–33)
MCHC RBC AUTO-ENTMCNC: 31.2 G/DL (ref 31.5–35.7)
MCV RBC AUTO: 98.7 FL (ref 79–97)
MICROALBUMIN/CREAT UR: 1298 MG/G
MONOCYTES # BLD AUTO: 0.88 10*3/MM3 (ref 0.1–0.9)
MONOCYTES NFR BLD AUTO: 9.7 % (ref 5–12)
NEUTROPHILS NFR BLD AUTO: 6.46 10*3/MM3 (ref 1.7–7)
NEUTROPHILS NFR BLD AUTO: 71.3 % (ref 42.7–76)
NITRITE UR QL STRIP: NEGATIVE
NRBC BLD AUTO-RTO: 0 /100 WBC (ref 0–0.2)
PH UR STRIP.AUTO: 7.5 [PH] (ref 5–8)
PLATELET # BLD AUTO: 261 10*3/MM3 (ref 140–450)
PMV BLD AUTO: 10.2 FL (ref 6–12)
POTASSIUM SERPL-SCNC: 4.5 MMOL/L (ref 3.5–5.2)
PROT ?TM UR-MCNC: 80 MG/DL
PROT SERPL-MCNC: 6.6 G/DL (ref 6–8.5)
PROT UR QL STRIP: ABNORMAL
PROT/CREAT UR: 1970.4 MG/G CREA (ref 0–200)
RBC # BLD AUTO: 3.96 10*6/MM3 (ref 4.14–5.8)
RBC # UR STRIP: NORMAL /HPF
REF LAB TEST METHOD: NORMAL
SODIUM SERPL-SCNC: 136 MMOL/L (ref 136–145)
SP GR UR STRIP: 1.01 (ref 1–1.03)
SQUAMOUS #/AREA URNS HPF: NORMAL /HPF
TIBC SERPL-MCNC: 291 MCG/DL (ref 298–536)
TRANSFERRIN SERPL-MCNC: 195 MG/DL (ref 200–360)
UROBILINOGEN UR QL STRIP: ABNORMAL
WBC # UR STRIP: NORMAL /HPF
WBC NRBC COR # BLD: 9.06 10*3/MM3 (ref 3.4–10.8)

## 2022-11-30 PROCEDURE — 82570 ASSAY OF URINE CREATININE: CPT

## 2022-11-30 PROCEDURE — 87086 URINE CULTURE/COLONY COUNT: CPT

## 2022-11-30 PROCEDURE — 84466 ASSAY OF TRANSFERRIN: CPT

## 2022-11-30 PROCEDURE — 84156 ASSAY OF PROTEIN URINE: CPT

## 2022-11-30 PROCEDURE — 83036 HEMOGLOBIN GLYCOSYLATED A1C: CPT

## 2022-11-30 PROCEDURE — 82728 ASSAY OF FERRITIN: CPT

## 2022-11-30 PROCEDURE — 85025 COMPLETE CBC W/AUTO DIFF WBC: CPT

## 2022-11-30 PROCEDURE — 81001 URINALYSIS AUTO W/SCOPE: CPT

## 2022-11-30 PROCEDURE — 82043 UR ALBUMIN QUANTITATIVE: CPT

## 2022-11-30 PROCEDURE — 83540 ASSAY OF IRON: CPT

## 2022-11-30 PROCEDURE — 80053 COMPREHEN METABOLIC PANEL: CPT

## 2022-11-30 PROCEDURE — 36415 COLL VENOUS BLD VENIPUNCTURE: CPT

## 2022-12-01 LAB — BACTERIA SPEC AEROBE CULT: NO GROWTH

## 2022-12-31 ENCOUNTER — HOSPITAL ENCOUNTER (EMERGENCY)
Facility: HOSPITAL | Age: 87
Discharge: ANOTHER HOSPITAL | End: 2022-12-31
Attending: STUDENT IN AN ORGANIZED HEALTH CARE EDUCATION/TRAINING PROGRAM | Admitting: STUDENT IN AN ORGANIZED HEALTH CARE EDUCATION/TRAINING PROGRAM
Payer: MEDICARE

## 2022-12-31 ENCOUNTER — APPOINTMENT (OUTPATIENT)
Dept: CT IMAGING | Facility: HOSPITAL | Age: 87
End: 2022-12-31
Payer: MEDICARE

## 2022-12-31 ENCOUNTER — APPOINTMENT (OUTPATIENT)
Dept: MRI IMAGING | Facility: HOSPITAL | Age: 87
End: 2022-12-31
Payer: MEDICARE

## 2022-12-31 ENCOUNTER — APPOINTMENT (OUTPATIENT)
Dept: GENERAL RADIOLOGY | Facility: HOSPITAL | Age: 87
End: 2022-12-31
Payer: MEDICARE

## 2022-12-31 VITALS
DIASTOLIC BLOOD PRESSURE: 82 MMHG | SYSTOLIC BLOOD PRESSURE: 168 MMHG | HEIGHT: 66 IN | HEART RATE: 90 BPM | TEMPERATURE: 97.8 F | WEIGHT: 176 LBS | BODY MASS INDEX: 28.28 KG/M2 | OXYGEN SATURATION: 99 % | RESPIRATION RATE: 18 BRPM

## 2022-12-31 DIAGNOSIS — M62.82 NON-TRAUMATIC RHABDOMYOLYSIS: ICD-10-CM

## 2022-12-31 DIAGNOSIS — R41.82 ALTERED MENTAL STATUS, UNSPECIFIED ALTERED MENTAL STATUS TYPE: Primary | ICD-10-CM

## 2022-12-31 DIAGNOSIS — G91.2 NORMAL PRESSURE HYDROCEPHALUS: ICD-10-CM

## 2022-12-31 LAB
ABO GROUP BLD: NORMAL
ABO GROUP BLD: NORMAL
ALBUMIN SERPL-MCNC: 4 G/DL (ref 3.5–5.2)
ALBUMIN/GLOB SERPL: 1.3 G/DL
ALP SERPL-CCNC: 110 U/L (ref 39–117)
ALT SERPL W P-5'-P-CCNC: 27 U/L (ref 1–41)
AMMONIA BLD-SCNC: 33 UMOL/L (ref 16–60)
AMPHET+METHAMPHET UR QL: NEGATIVE
AMPHETAMINES UR QL: NEGATIVE
ANION GAP SERPL CALCULATED.3IONS-SCNC: 11.7 MMOL/L (ref 5–15)
APTT PPP: 58.3 SECONDS (ref 26.5–34.5)
AST SERPL-CCNC: 73 U/L (ref 1–40)
BACTERIA UR QL AUTO: ABNORMAL /HPF
BARBITURATES UR QL SCN: NEGATIVE
BASOPHILS # BLD AUTO: 0.06 10*3/MM3 (ref 0–0.2)
BASOPHILS NFR BLD AUTO: 0.6 % (ref 0–1.5)
BENZODIAZ UR QL SCN: NEGATIVE
BILIRUB SERPL-MCNC: 0.4 MG/DL (ref 0–1.2)
BILIRUB UR QL STRIP: NEGATIVE
BLD GP AB SCN SERPL QL: NEGATIVE
BUN SERPL-MCNC: 18 MG/DL (ref 8–23)
BUN/CREAT SERPL: 12.3 (ref 7–25)
BUPRENORPHINE SERPL-MCNC: NEGATIVE NG/ML
CALCIUM SPEC-SCNC: 10.2 MG/DL (ref 8.6–10.5)
CANNABINOIDS SERPL QL: NEGATIVE
CHLORIDE SERPL-SCNC: 97 MMOL/L (ref 98–107)
CK SERPL-CCNC: 2365 U/L (ref 20–200)
CLARITY UR: CLEAR
CO2 SERPL-SCNC: 24.3 MMOL/L (ref 22–29)
COCAINE UR QL: NEGATIVE
COLOR UR: YELLOW
CREAT SERPL-MCNC: 1.46 MG/DL (ref 0.76–1.27)
CRP SERPL-MCNC: 1.05 MG/DL (ref 0–0.5)
DEPRECATED RDW RBC AUTO: 48.9 FL (ref 37–54)
EGFRCR SERPLBLD CKD-EPI 2021: 46.3 ML/MIN/1.73
EOSINOPHIL # BLD AUTO: 0.04 10*3/MM3 (ref 0–0.4)
EOSINOPHIL NFR BLD AUTO: 0.4 % (ref 0.3–6.2)
ERYTHROCYTE [DISTWIDTH] IN BLOOD BY AUTOMATED COUNT: 14.1 % (ref 12.3–15.4)
ERYTHROCYTE [SEDIMENTATION RATE] IN BLOOD: 46 MM/HR (ref 0–20)
ETHANOL BLD-MCNC: <10 MG/DL (ref 0–10)
ETHANOL UR QL: <0.01 %
FLUAV RNA RESP QL NAA+PROBE: NOT DETECTED
FLUBV RNA RESP QL NAA+PROBE: NOT DETECTED
GLOBULIN UR ELPH-MCNC: 3.2 GM/DL
GLUCOSE SERPL-MCNC: 112 MG/DL (ref 65–99)
GLUCOSE UR STRIP-MCNC: NEGATIVE MG/DL
HCT VFR BLD AUTO: 40.4 % (ref 37.5–51)
HGB BLD-MCNC: 13.3 G/DL (ref 13–17.7)
HGB UR QL STRIP.AUTO: ABNORMAL
HOLD SPECIMEN: NORMAL
HYALINE CASTS UR QL AUTO: ABNORMAL /LPF
IMM GRANULOCYTES # BLD AUTO: 0.04 10*3/MM3 (ref 0–0.05)
IMM GRANULOCYTES NFR BLD AUTO: 0.4 % (ref 0–0.5)
INR PPP: 0.96 (ref 0.9–1.1)
KETONES UR QL STRIP: NEGATIVE
LEUKOCYTE ESTERASE UR QL STRIP.AUTO: NEGATIVE
LYMPHOCYTES # BLD AUTO: 1.38 10*3/MM3 (ref 0.7–3.1)
LYMPHOCYTES NFR BLD AUTO: 14.3 % (ref 19.6–45.3)
MCH RBC QN AUTO: 31.1 PG (ref 26.6–33)
MCHC RBC AUTO-ENTMCNC: 32.9 G/DL (ref 31.5–35.7)
MCV RBC AUTO: 94.4 FL (ref 79–97)
METHADONE UR QL SCN: NEGATIVE
MONOCYTES # BLD AUTO: 1.01 10*3/MM3 (ref 0.1–0.9)
MONOCYTES NFR BLD AUTO: 10.4 % (ref 5–12)
MYOGLOBIN SERPL-MCNC: 1745 NG/ML (ref 28–72)
NEUTROPHILS NFR BLD AUTO: 7.15 10*3/MM3 (ref 1.7–7)
NEUTROPHILS NFR BLD AUTO: 73.9 % (ref 42.7–76)
NITRITE UR QL STRIP: NEGATIVE
NRBC BLD AUTO-RTO: 0 /100 WBC (ref 0–0.2)
OPIATES UR QL: NEGATIVE
OXYCODONE UR QL SCN: NEGATIVE
PCP UR QL SCN: NEGATIVE
PH UR STRIP.AUTO: 6 [PH] (ref 5–8)
PLATELET # BLD AUTO: 250 10*3/MM3 (ref 140–450)
PMV BLD AUTO: 10 FL (ref 6–12)
POTASSIUM SERPL-SCNC: 3.7 MMOL/L (ref 3.5–5.2)
PROPOXYPH UR QL: NEGATIVE
PROT SERPL-MCNC: 7.2 G/DL (ref 6–8.5)
PROT UR QL STRIP: ABNORMAL
PROTHROMBIN TIME: 13 SECONDS (ref 12.1–14.7)
RBC # BLD AUTO: 4.28 10*6/MM3 (ref 4.14–5.8)
RBC # UR STRIP: ABNORMAL /HPF
REF LAB TEST METHOD: ABNORMAL
RH BLD: POSITIVE
RH BLD: POSITIVE
SARS-COV-2 RNA RESP QL NAA+PROBE: NOT DETECTED
SODIUM SERPL-SCNC: 133 MMOL/L (ref 136–145)
SP GR UR STRIP: 1.01 (ref 1–1.03)
SQUAMOUS #/AREA URNS HPF: ABNORMAL /HPF
T&S EXPIRATION DATE: NORMAL
T4 FREE SERPL-MCNC: 1.21 NG/DL (ref 0.93–1.7)
TRICYCLICS UR QL SCN: NEGATIVE
TROPONIN T SERPL-MCNC: 0.08 NG/ML (ref 0–0.03)
TROPONIN T SERPL-MCNC: 0.08 NG/ML (ref 0–0.03)
TSH SERPL DL<=0.05 MIU/L-ACNC: 2.67 UIU/ML (ref 0.27–4.2)
UROBILINOGEN UR QL STRIP: ABNORMAL
WBC # UR STRIP: ABNORMAL /HPF
WBC NRBC COR # BLD: 9.68 10*3/MM3 (ref 3.4–10.8)
WHOLE BLOOD HOLD COAG: NORMAL
WHOLE BLOOD HOLD SPECIMEN: NORMAL

## 2022-12-31 PROCEDURE — 86900 BLOOD TYPING SEROLOGIC ABO: CPT

## 2022-12-31 PROCEDURE — 83874 ASSAY OF MYOGLOBIN: CPT | Performed by: STUDENT IN AN ORGANIZED HEALTH CARE EDUCATION/TRAINING PROGRAM

## 2022-12-31 PROCEDURE — 86901 BLOOD TYPING SEROLOGIC RH(D): CPT | Performed by: STUDENT IN AN ORGANIZED HEALTH CARE EDUCATION/TRAINING PROGRAM

## 2022-12-31 PROCEDURE — 86850 RBC ANTIBODY SCREEN: CPT | Performed by: STUDENT IN AN ORGANIZED HEALTH CARE EDUCATION/TRAINING PROGRAM

## 2022-12-31 PROCEDURE — 85652 RBC SED RATE AUTOMATED: CPT | Performed by: STUDENT IN AN ORGANIZED HEALTH CARE EDUCATION/TRAINING PROGRAM

## 2022-12-31 PROCEDURE — 99285 EMERGENCY DEPT VISIT HI MDM: CPT

## 2022-12-31 PROCEDURE — 0042T HC CT CEREBRAL PERFUSION W/WO CONTRAST: CPT

## 2022-12-31 PROCEDURE — 85025 COMPLETE CBC W/AUTO DIFF WBC: CPT | Performed by: STUDENT IN AN ORGANIZED HEALTH CARE EDUCATION/TRAINING PROGRAM

## 2022-12-31 PROCEDURE — 84439 ASSAY OF FREE THYROXINE: CPT | Performed by: STUDENT IN AN ORGANIZED HEALTH CARE EDUCATION/TRAINING PROGRAM

## 2022-12-31 PROCEDURE — 93005 ELECTROCARDIOGRAM TRACING: CPT | Performed by: STUDENT IN AN ORGANIZED HEALTH CARE EDUCATION/TRAINING PROGRAM

## 2022-12-31 PROCEDURE — 86140 C-REACTIVE PROTEIN: CPT | Performed by: STUDENT IN AN ORGANIZED HEALTH CARE EDUCATION/TRAINING PROGRAM

## 2022-12-31 PROCEDURE — 71045 X-RAY EXAM CHEST 1 VIEW: CPT

## 2022-12-31 PROCEDURE — 81001 URINALYSIS AUTO W/SCOPE: CPT | Performed by: STUDENT IN AN ORGANIZED HEALTH CARE EDUCATION/TRAINING PROGRAM

## 2022-12-31 PROCEDURE — 74176 CT ABD & PELVIS W/O CONTRAST: CPT

## 2022-12-31 PROCEDURE — 82077 ASSAY SPEC XCP UR&BREATH IA: CPT | Performed by: STUDENT IN AN ORGANIZED HEALTH CARE EDUCATION/TRAINING PROGRAM

## 2022-12-31 PROCEDURE — 87636 SARSCOV2 & INF A&B AMP PRB: CPT | Performed by: STUDENT IN AN ORGANIZED HEALTH CARE EDUCATION/TRAINING PROGRAM

## 2022-12-31 PROCEDURE — 0 IOPAMIDOL PER 1 ML: Performed by: STUDENT IN AN ORGANIZED HEALTH CARE EDUCATION/TRAINING PROGRAM

## 2022-12-31 PROCEDURE — 36415 COLL VENOUS BLD VENIPUNCTURE: CPT

## 2022-12-31 PROCEDURE — 85610 PROTHROMBIN TIME: CPT | Performed by: STUDENT IN AN ORGANIZED HEALTH CARE EDUCATION/TRAINING PROGRAM

## 2022-12-31 PROCEDURE — 80053 COMPREHEN METABOLIC PANEL: CPT | Performed by: STUDENT IN AN ORGANIZED HEALTH CARE EDUCATION/TRAINING PROGRAM

## 2022-12-31 PROCEDURE — 84484 ASSAY OF TROPONIN QUANT: CPT | Performed by: STUDENT IN AN ORGANIZED HEALTH CARE EDUCATION/TRAINING PROGRAM

## 2022-12-31 PROCEDURE — 70496 CT ANGIOGRAPHY HEAD: CPT

## 2022-12-31 PROCEDURE — 70498 CT ANGIOGRAPHY NECK: CPT

## 2022-12-31 PROCEDURE — P9612 CATHETERIZE FOR URINE SPEC: HCPCS

## 2022-12-31 PROCEDURE — 86901 BLOOD TYPING SEROLOGIC RH(D): CPT

## 2022-12-31 PROCEDURE — 82550 ASSAY OF CK (CPK): CPT | Performed by: STUDENT IN AN ORGANIZED HEALTH CARE EDUCATION/TRAINING PROGRAM

## 2022-12-31 PROCEDURE — 70450 CT HEAD/BRAIN W/O DYE: CPT

## 2022-12-31 PROCEDURE — 84443 ASSAY THYROID STIM HORMONE: CPT | Performed by: STUDENT IN AN ORGANIZED HEALTH CARE EDUCATION/TRAINING PROGRAM

## 2022-12-31 PROCEDURE — 82962 GLUCOSE BLOOD TEST: CPT

## 2022-12-31 PROCEDURE — 86900 BLOOD TYPING SEROLOGIC ABO: CPT | Performed by: STUDENT IN AN ORGANIZED HEALTH CARE EDUCATION/TRAINING PROGRAM

## 2022-12-31 PROCEDURE — 85730 THROMBOPLASTIN TIME PARTIAL: CPT | Performed by: STUDENT IN AN ORGANIZED HEALTH CARE EDUCATION/TRAINING PROGRAM

## 2022-12-31 PROCEDURE — 82140 ASSAY OF AMMONIA: CPT | Performed by: STUDENT IN AN ORGANIZED HEALTH CARE EDUCATION/TRAINING PROGRAM

## 2022-12-31 PROCEDURE — 80306 DRUG TEST PRSMV INSTRMNT: CPT | Performed by: STUDENT IN AN ORGANIZED HEALTH CARE EDUCATION/TRAINING PROGRAM

## 2022-12-31 RX ORDER — SODIUM CHLORIDE 0.9 % (FLUSH) 0.9 %
10 SYRINGE (ML) INJECTION AS NEEDED
Status: DISCONTINUED | OUTPATIENT
Start: 2022-12-31 | End: 2022-12-31 | Stop reason: HOSPADM

## 2022-12-31 RX ADMIN — IOPAMIDOL 60 ML: 755 INJECTION, SOLUTION INTRAVENOUS at 11:39

## 2022-12-31 RX ADMIN — SODIUM CHLORIDE 1500 ML: 9 INJECTION, SOLUTION INTRAVENOUS at 15:27

## 2022-12-31 RX ADMIN — IOPAMIDOL 50 ML: 755 INJECTION, SOLUTION INTRAVENOUS at 11:39

## 2022-12-31 NOTE — ED PROVIDER NOTES
Subjective   History of Present Illness  87-year-old male with past medical history of congestive heart failure, chronic kidney disease, diabetes, hypertension, and normal pressure hydrocephalus requiring a  shunt presents to the ER with EMS due to concerns for altered mental status.  Last known normal was at midnight on 12/31/2022 when the patient went to bed.  Patient woke up with dysarthric speech and hallucinations.  No obvious upper or lower extremity focal neurological deficits.  Speech is unclear, dysarthric, and mumbled.  Patient does direct attention to painful stimuli.  No obvious sensation deficits.  Vital signs stable.  Stroke alert initiated on arrival        Review of Systems   Unable to perform ROS: Mental status change       Past Medical History:   Diagnosis Date   • BPH with urinary obstruction    • CHF (congestive heart failure) ([facility])    • Chronic kidney disease    • Diabetes mellitus (HCC)    • Hypertension        Allergies   Allergen Reactions   • Keflex [Cephalexin] Anaphylaxis and Hives   • Flomax [Tamsulosin] Hives   • Sulfa Antibiotics Hives and GI Intolerance       Past Surgical History:   Procedure Laterality Date   • CARDIAC SURGERY     • CARDIAC VALVE REPLACEMENT         Family History   Problem Relation Age of Onset   • No Known Problems Father    • No Known Problems Mother        Social History     Socioeconomic History   • Marital status:    Tobacco Use   • Smoking status: Never   Substance and Sexual Activity   • Alcohol use: No   • Drug use: No           Objective   Physical Exam  Constitutional:       General: He is not in acute distress.     Appearance: He is well-developed. He is not ill-appearing.   HENT:      Head: Normocephalic and atraumatic.   Eyes:      Extraocular Movements: Extraocular movements intact.      Pupils: Pupils are equal, round, and reactive to light.   Neck:      Vascular: No JVD.   Cardiovascular:      Rate and Rhythm: Normal rate and regular rhythm.       Heart sounds: Normal heart sounds. No murmur heard.  Pulmonary:      Effort: No tachypnea, accessory muscle usage or respiratory distress.      Breath sounds: Normal breath sounds. No stridor. No decreased breath sounds, wheezing, rhonchi or rales.   Chest:      Chest wall: No deformity, tenderness or crepitus.   Abdominal:      General: Bowel sounds are normal.      Palpations: Abdomen is soft.      Tenderness: There is no abdominal tenderness. There is no guarding or rebound.   Musculoskeletal:         General: Normal range of motion.      Cervical back: Normal range of motion and neck supple.      Right lower leg: No tenderness. No edema.      Left lower leg: No tenderness. No edema.   Lymphadenopathy:      Cervical: No cervical adenopathy.   Skin:     General: Skin is warm and dry.      Coloration: Skin is not cyanotic.      Findings: No ecchymosis or erythema.   Neurological:      General: No focal deficit present.      Mental Status: He is alert. He is disoriented and confused.      Cranial Nerves: Dysarthria present. No cranial nerve deficit or facial asymmetry.      Motor: No weakness.      Comments: No obvious upper or lower motor extremity deficits.  No sensation deficits.  Dysarthric speech noted.  Confusion and hallucinations noted.   Psychiatric:         Mood and Affect: Mood normal. Mood is not anxious.         Behavior: Behavior normal. Behavior is not agitated.         Procedures           ED Course  ED Course as of 12/31/22 1749   Sat Dec 31, 2022   1109 Stroke alert called on patient arrival. [SF]   1109 Telestroke neurology team made aware of this patient at 11:10 AM [SF]   1121 Telestroke neurologist, Dr. Angulo [SF]   1139 I updated Dr. Roy with telestroke neurology as well. [SF]   1214 EKG notes sinus rhythm.  Widened QRS noted.  Left bundle branch noted.  79 bpm.  QTc 490.  No acute ST elevation.  Some ST inversions noted.    Electronically signed by Miguel Angel Armando DO, 12/31/22,  12:15 PM EST.   [SF]   1215 Of note, patient had a  shunt placed at the [facility] in September 2022 for complications secondary to normal pressure hydrocephalus [SF]   1648 Patient's family has made multiple contacts with the hospitalist team at [facility], attempting to transfer this patient at this point time. [SF]   1730 I received a phone call from Dr. Cuadra and the patient will be directly excepted to the hospitalist floor.  Awaiting return phone call from [facility] is bedboard team to coordinate proper transfer. [SF]   1740 Patient accepted to the [facility].  Vital stable transfer [SF]      ED Course User Index  [SF] Miguel Angel Armando DO                                           Medical Decision Making  Altered mental status, unspecified altered mental status type: complicated acute illness or injury  Non-traumatic rhabdomyolysis: complicated acute illness or injury  Normal pressure hydrocephalus (HCC): complicated acute illness or injury  Amount and/or Complexity of Data Reviewed  Labs: ordered.  Radiology: ordered.  ECG/medicine tests: ordered.      Risk  Prescription drug management.  Decision regarding hospitalization.          Final diagnoses:   Altered mental status, unspecified altered mental status type   Normal pressure hydrocephalus (HCC)   Non-traumatic rhabdomyolysis       ED Disposition  ED Disposition     ED Disposition   Decision to Admit    Condition   --    Comment   --             No follow-up provider specified.       Medication List      No changes were made to your prescriptions during this visit.          Miguel Angel Aramndo DO  12/31/22 0333

## 2022-12-31 NOTE — ED NOTES
Dr. Cuadra from [facility] called to talk to Dr. Armando about transfer, they have excepted patient and will call back when his room is ready.

## 2022-12-31 NOTE — CONSULTS
Teleneurology Phone Consultation Note    Date of Consultation: 12/31/2022  Requesting Attending: Miguel Angel Armando DO  Chief Complaint: Altered mental status  Location of patient: Emergency Room  Last known well date/Time if applicable: 12/31/2022 @ midnight (12/30 -> 12/31)  Date/Time Telestroke Doctor on phone: 12/31/2022 @ 1866 (initial call triaged by Dr. 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 No appreciable weakness or numbness on exam. Patient speaking but difficult to understand - poor attention, not following commands consistently.    NCCT head completed with no evidence for hemorrhage or large evolving cortical infarction - limited by motion and streak artifact. Right frontal catheter from VPS noted. Stable ventricular enlargement.     CTA with diffuse eccentric calcifications but no LVO or significant stenosis (> 70%). CTP maps may not be reliable but there is no clinically consistent perfusion defect that would explain symptoms. Radiology read pending.     Currently clinical presentation with acute confusion, mumbling speech, and hallucinations is due to unclear etiology. Differential considerations include contributions from toxic metabolic encephalopathy (infections, metabolic disturbance, medication effects), acute delirium, seizure, and less likely ischemic encephalopathy.     Recommendations  - Continue neuro assessments and vital signs Q4 hrs with continuous telemetry. Please notify on-call Neurology with any abrupt change in  exam.   - Brain MRI w/wo contrast.   - Follow up formal reads for CTA/P.   - Routine EEG when feasible - if unable to get EEG this weekend and there is no other clear etiology for acute encephalopathy, empiric AED trial is reasonable with Vimpat 100 mg PO/IV Q 12 hrs (would avoid Keppra, given some potential for worsening agitation in patients of advanced age).   - Permissive HTN to SBP < 180 mmHg x additional 24 hrs pending MRI evaluation to r/o stroke, provided no contraindications to permissive HTN (severe CHF, CAD, etc).   - Following initial period of permissive HTN, slow titration of oral antihypertensive medications to goal BP < 130/80 mmHg as tolerated.   - Continue home  mg with Crestor 10 mg PO QHS for CAD hx.   - Stroke labs: Hgb A1c / lipid panel / TSH, free T4 / Vit B12 / Vit D level. Would also check U/A with reflex culture, CXR, and screen for COVID and influenza.   - Transthoracic echo if MRI demonstrates acute stroke.    - Delirium precautions: Lights on, shades open from 0700 to 1900 daily with frequent reorientation. Typical risk factors for delirium include advanced age, premorbid neurological disease, significant impairment in hearing or vision, critical illness, or prolonged hospital admission.   - Consider Seroquel 12.5 to 25 mg QHS (scheduled at 1800) for any nocturnal agitation, day-night reversal, or further concern for delirium complicating admission. Trend QTc on EKG, telemetry with prolonged use, dose escalation.   - Continued PT/OT/SLP evaluation for any post-discharge rehab needs.   - Will need follow-up evaluation in Neurology Clinic w/in 4-6 weeks following discharge.     Thank you for the opportunity to participate in the care of this patient. We will follow along with you.     If you have any questions about the patient recommendations, please call [phone] at anytime and ask to speak with the neurologist on call. Press 1 for an emergent consult and 2 for a non-emergent  consult.    Ml Arellano MD   Teleneurology Consultant      Teleneurology Disclaimer  Initial recommendations are based on my read of the source images. As additional reconstructions are generated, the final report of the neuroimages may change. Primary service to contact me back if the final report from radiology differs from my preliminary reading.     Also, these recommendations were developed based on phone conversation with the patient provider at the time of the initial consultation. The treatment plan and investigations may need to be modified based on additional information that may become available during the course of this hospitalization. We defer further workup/management to the local neurology team.     I, Ml Arellano MD, was consulted about this patient on 12/31/2022 for discussion over the phone regarding management of the patient's care via a provider to provider discussion. The location of the patient was at [facility] My location was [place], [place]. The recommendations are based off information I received from the consulting provider.

## 2022-12-31 NOTE — ED NOTES
Called [employer], they advised all their aircrafts are out on flights but will call us and do a weather check when they get freed up.

## 2022-12-31 NOTE — ED NOTES
Called [facility] for transport[place]

## 2023-01-01 LAB
QT INTERVAL: 434 MS
QTC INTERVAL: 512 MS

## 2023-01-01 NOTE — ED NOTES
[facility] with [employer] called back, they accepted transport. ETA is 15-20 minutes. Primary RN Wolf denney.

## 2023-01-02 LAB — GLUCOSE BLDC GLUCOMTR-MCNC: 123 MG/DL (ref 70–130)

## 2023-01-06 LAB — CREAT BLDA-MCNC: 1.6 MG/DL

## 2024-01-08 ENCOUNTER — HOSPITAL ENCOUNTER (EMERGENCY)
Facility: HOSPITAL | Age: 89
Discharge: HOME | End: 2024-01-09
Attending: STUDENT IN AN ORGANIZED HEALTH CARE EDUCATION/TRAINING PROGRAM | Admitting: STUDENT IN AN ORGANIZED HEALTH CARE EDUCATION/TRAINING PROGRAM
Payer: MEDICARE

## 2024-01-08 ENCOUNTER — APPOINTMENT (OUTPATIENT)
Dept: GENERAL RADIOLOGY | Facility: HOSPITAL | Age: 89
End: 2024-01-08
Payer: MEDICARE

## 2024-01-08 DIAGNOSIS — U07.1 COVID-19: Primary | ICD-10-CM

## 2024-01-08 DIAGNOSIS — R79.89 ELEVATED TROPONIN: ICD-10-CM

## 2024-01-08 DIAGNOSIS — R06.02 SHORTNESS OF BREATH: ICD-10-CM

## 2024-01-08 LAB
ALBUMIN SERPL-MCNC: 3.5 G/DL (ref 3.5–5.2)
ALBUMIN/GLOB SERPL: 1.2 G/DL
ALP SERPL-CCNC: 116 U/L (ref 39–117)
ALT SERPL W P-5'-P-CCNC: 19 U/L (ref 1–41)
ANION GAP SERPL CALCULATED.3IONS-SCNC: 8 MMOL/L (ref 5–15)
AST SERPL-CCNC: 22 U/L (ref 1–40)
BASOPHILS # BLD AUTO: 0.04 10*3/MM3 (ref 0–0.2)
BASOPHILS NFR BLD AUTO: 0.5 % (ref 0–1.5)
BILIRUB SERPL-MCNC: 0.3 MG/DL (ref 0–1.2)
BUN SERPL-MCNC: 30 MG/DL (ref 8–23)
BUN/CREAT SERPL: 18.5 (ref 7–25)
CALCIUM SPEC-SCNC: 8.6 MG/DL (ref 8.6–10.5)
CHLORIDE SERPL-SCNC: 88 MMOL/L (ref 98–107)
CO2 SERPL-SCNC: 24 MMOL/L (ref 22–29)
CREAT SERPL-MCNC: 1.62 MG/DL (ref 0.76–1.27)
DEPRECATED RDW RBC AUTO: 49.9 FL (ref 37–54)
EGFRCR SERPLBLD CKD-EPI 2021: 40.6 ML/MIN/1.73
EOSINOPHIL # BLD AUTO: 0.49 10*3/MM3 (ref 0–0.4)
EOSINOPHIL NFR BLD AUTO: 6 % (ref 0.3–6.2)
ERYTHROCYTE [DISTWIDTH] IN BLOOD BY AUTOMATED COUNT: 13.9 % (ref 12.3–15.4)
FLUAV RNA RESP QL NAA+PROBE: NOT DETECTED
FLUBV RNA RESP QL NAA+PROBE: NOT DETECTED
GLOBULIN UR ELPH-MCNC: 3 GM/DL
GLUCOSE SERPL-MCNC: 213 MG/DL (ref 65–99)
HCT VFR BLD AUTO: 28.3 % (ref 37.5–51)
HGB BLD-MCNC: 9 G/DL (ref 13–17.7)
IMM GRANULOCYTES # BLD AUTO: 0.06 10*3/MM3 (ref 0–0.05)
IMM GRANULOCYTES NFR BLD AUTO: 0.7 % (ref 0–0.5)
LYMPHOCYTES # BLD AUTO: 1.28 10*3/MM3 (ref 0.7–3.1)
LYMPHOCYTES NFR BLD AUTO: 15.8 % (ref 19.6–45.3)
MCH RBC QN AUTO: 30.9 PG (ref 26.6–33)
MCHC RBC AUTO-ENTMCNC: 31.8 G/DL (ref 31.5–35.7)
MCV RBC AUTO: 97.3 FL (ref 79–97)
MONOCYTES # BLD AUTO: 0.96 10*3/MM3 (ref 0.1–0.9)
MONOCYTES NFR BLD AUTO: 11.9 % (ref 5–12)
NEUTROPHILS NFR BLD AUTO: 5.27 10*3/MM3 (ref 1.7–7)
NEUTROPHILS NFR BLD AUTO: 65.1 % (ref 42.7–76)
NRBC BLD AUTO-RTO: 0 /100 WBC (ref 0–0.2)
NT-PROBNP SERPL-MCNC: 1591 PG/ML (ref 0–1800)
PLATELET # BLD AUTO: 190 10*3/MM3 (ref 140–450)
PMV BLD AUTO: 9.4 FL (ref 6–12)
POTASSIUM SERPL-SCNC: 5.1 MMOL/L (ref 3.5–5.2)
PROT SERPL-MCNC: 6.5 G/DL (ref 6–8.5)
RBC # BLD AUTO: 2.91 10*6/MM3 (ref 4.14–5.8)
SARS-COV-2 RNA RESP QL NAA+PROBE: DETECTED
SODIUM SERPL-SCNC: 120 MMOL/L (ref 136–145)
TROPONIN T SERPL HS-MCNC: 62 NG/L
WBC NRBC COR # BLD AUTO: 8.1 10*3/MM3 (ref 3.4–10.8)

## 2024-01-08 PROCEDURE — 93005 ELECTROCARDIOGRAM TRACING: CPT | Performed by: STUDENT IN AN ORGANIZED HEALTH CARE EDUCATION/TRAINING PROGRAM

## 2024-01-08 PROCEDURE — 85025 COMPLETE CBC W/AUTO DIFF WBC: CPT | Performed by: STUDENT IN AN ORGANIZED HEALTH CARE EDUCATION/TRAINING PROGRAM

## 2024-01-08 PROCEDURE — 84484 ASSAY OF TROPONIN QUANT: CPT | Performed by: STUDENT IN AN ORGANIZED HEALTH CARE EDUCATION/TRAINING PROGRAM

## 2024-01-08 PROCEDURE — 83880 ASSAY OF NATRIURETIC PEPTIDE: CPT | Performed by: STUDENT IN AN ORGANIZED HEALTH CARE EDUCATION/TRAINING PROGRAM

## 2024-01-08 PROCEDURE — 99284 EMERGENCY DEPT VISIT MOD MDM: CPT

## 2024-01-08 PROCEDURE — 87636 SARSCOV2 & INF A&B AMP PRB: CPT | Performed by: STUDENT IN AN ORGANIZED HEALTH CARE EDUCATION/TRAINING PROGRAM

## 2024-01-08 PROCEDURE — 36415 COLL VENOUS BLD VENIPUNCTURE: CPT

## 2024-01-08 PROCEDURE — 71045 X-RAY EXAM CHEST 1 VIEW: CPT

## 2024-01-08 PROCEDURE — 80053 COMPREHEN METABOLIC PANEL: CPT | Performed by: STUDENT IN AN ORGANIZED HEALTH CARE EDUCATION/TRAINING PROGRAM

## 2024-01-09 VITALS
HEART RATE: 69 BPM | WEIGHT: 176 LBS | OXYGEN SATURATION: 97 % | DIASTOLIC BLOOD PRESSURE: 73 MMHG | SYSTOLIC BLOOD PRESSURE: 160 MMHG | HEIGHT: 66 IN | TEMPERATURE: 98.6 F | BODY MASS INDEX: 28.28 KG/M2 | RESPIRATION RATE: 22 BRPM

## 2024-01-09 LAB
GEN 5 2HR TROPONIN T REFLEX: 63 NG/L
QT INTERVAL: 452 MS
QTC INTERVAL: 497 MS
TROPONIN T DELTA: 1 NG/L

## 2024-01-09 RX ORDER — ALBUTEROL SULFATE 90 UG/1
2 AEROSOL, METERED RESPIRATORY (INHALATION) ONCE
Status: COMPLETED | OUTPATIENT
Start: 2024-01-09 | End: 2024-01-09

## 2024-01-09 RX ORDER — ACETAMINOPHEN 500 MG
1000 TABLET ORAL ONCE
Status: COMPLETED | OUTPATIENT
Start: 2024-01-09 | End: 2024-01-09

## 2024-01-09 RX ADMIN — ACETAMINOPHEN 1000 MG: 500 TABLET ORAL at 04:21

## 2024-01-09 RX ADMIN — ALBUTEROL SULFATE 2 PUFF: 90 AEROSOL, METERED RESPIRATORY (INHALATION) at 00:56

## 2024-01-09 NOTE — ED NOTES
Pt keeps taking BP cuff off. Pt informed the risks of not wearing it. Pt insists to leave cuff off. Informed pt of risks once again. Pt understood.

## 2024-01-09 NOTE — ED NOTES
Pt family leaving. Gave phone numbers for any updates and for notification of when pt gets a ride.   April: [phone]  Naima: [phone] (POKATARINA)

## 2024-01-09 NOTE — ED PROVIDER NOTES
Subjective   History of Present Illness  88-year-old male past medical history of CHF, CKD, diabetes, hypertension presents to the ED from nursing home for shortness of breath.  Well-appearing on arrival with normal vitals.  Per EMS, patient recently tested positive for COVID.  Patient sounds very congested and he states that he has been coughing up gunk.  Denies any chest pain, abdominal pain, nausea vomiting, or any other symptoms.    History provided by:  Patient      Review of Systems    Past Medical History:   Diagnosis Date    BPH with urinary obstruction     CHF (congestive heart failure)     Chronic kidney disease     Diabetes mellitus     Hypertension        Allergies   Allergen Reactions    Keflex [Cephalexin] Anaphylaxis and Hives    Flomax [Tamsulosin] Hives    Sulfa Antibiotics Hives and GI Intolerance       Past Surgical History:   Procedure Laterality Date    CARDIAC SURGERY      CARDIAC VALVE REPLACEMENT         Family History   Problem Relation Age of Onset    No Known Problems Father     No Known Problems Mother        Social History     Socioeconomic History    Marital status:    Tobacco Use    Smoking status: Never   Substance and Sexual Activity    Alcohol use: No    Drug use: No           Objective   Physical Exam  Constitutional:       General: He is not in acute distress.     Appearance: He is not ill-appearing.   HENT:      Head: Normocephalic and atraumatic.   Eyes:      Conjunctiva/sclera: Conjunctivae normal.   Cardiovascular:      Rate and Rhythm: Normal rate and regular rhythm.      Heart sounds: Normal heart sounds.   Pulmonary:      Effort: Pulmonary effort is normal.      Breath sounds: No wheezing.      Comments: Loud transmitted upper airway sounds  Abdominal:      General: Abdomen is flat.      Palpations: Abdomen is soft.      Tenderness: There is no abdominal tenderness.   Musculoskeletal:      Right lower leg: No tenderness. No edema.      Left lower leg: No tenderness.  No edema.   Neurological:      General: No focal deficit present.      Mental Status: He is alert and oriented to person, place, and time. Mental status is at baseline.         Procedures           ED Course  ED Course as of 01/09/24 0040   Mon Jan 08, 2024 2126 EKG obtained on arrival and independently interpreted as ventricular paced rhythm without acute ischemic findings or arrhythmia. [AS]      ED Course User Index  [AS] Barry Greenwood MD                                             Medical Decision Making  88-year-old male presents to the ED with shortness of breath and known COVID infection.  Vital signs normal on arrival.  Patient sounded mildly congested but otherwise appeared normal.  EKG showed paced rhythm without acute ischemic findings.  Will obtain labs and chest x-ray and respiratory swab for further evaluation.  Swab was COVID-positive.  Chest x-ray consistent with COVID infection without any signs of superimposed pneumonia.  Labs showed elevated troponin with uncertain baseline.  Sodium was 120 but patient was asymptomatic.  This is likely chronic and not of current significance.  Creatinine slightly elevated from baseline.  Repeat troponin was stable.  On reevaluation, patient's vitals were normal.  He was placed on 2 L as he typically wears 2 L at night.  Discussed findings with patient and his family and they felt comfortable with him going back to the nursing home given that he was well-appearing.  They did ask that he be given a spacer for his inhaler so an albuterol treatment was given and patient was given a spacer.  Patient was discharged pending ride back to the nursing home.    Problems Addressed:  COVID-19: complicated acute illness or injury  Elevated troponin: complicated acute illness or injury  Shortness of breath: complicated acute illness or injury    Amount and/or Complexity of Data Reviewed  Labs: ordered.  Radiology: ordered.  ECG/medicine tests: ordered and independent  interpretation performed.    Risk  Prescription drug management.        Final diagnoses:   COVID-19   Shortness of breath   Elevated troponin       ED Disposition  ED Disposition       ED Disposition   Discharge    Condition   Stable    Comment   --               Zulma Cook MD  [address] [address] [phone]    Schedule an appointment as soon as possible for a visit   As needed    [facility] EMERGENCY DEPARTMENT  [address] [place] [id number] [phone]    If symptoms worsen         Medication List      No changes were made to your prescriptions during this visit.            Barry Greenwood MD  01/09/24 0040

## 2024-01-09 NOTE — ED NOTES
Pt requesting to go back to the nursing home. Pt is informed that he is waiting for transportation still. Explained to pt that when something becomes available that he will get to be transported back. Pt has no other complaints at this time. Pt is resting on stretcher. Bed is locked in lowest position with side rails up x2. CHICA MARY.